# Patient Record
Sex: FEMALE | Race: WHITE | Employment: PART TIME | URBAN - METROPOLITAN AREA
[De-identification: names, ages, dates, MRNs, and addresses within clinical notes are randomized per-mention and may not be internally consistent; named-entity substitution may affect disease eponyms.]

---

## 2017-02-27 LAB
HBSAG, EXTERNAL: NEGATIVE
HIV, EXTERNAL: NON REACTIVE
RUBELLA, EXTERNAL: NORMAL
TYPE, ABO & RH, EXTERNAL: NORMAL

## 2017-06-01 ENCOUNTER — CLINICAL SUPPORT (OUTPATIENT)
Dept: CARDIOLOGY CLINIC | Age: 37
End: 2017-06-01

## 2017-06-01 ENCOUNTER — OFFICE VISIT (OUTPATIENT)
Dept: CARDIOLOGY CLINIC | Age: 37
End: 2017-06-01

## 2017-06-01 VITALS — SYSTOLIC BLOOD PRESSURE: 122 MMHG | WEIGHT: 218.2 LBS | HEART RATE: 112 BPM | DIASTOLIC BLOOD PRESSURE: 80 MMHG

## 2017-06-01 DIAGNOSIS — R00.2 PALPITATIONS: Primary | ICD-10-CM

## 2017-06-01 DIAGNOSIS — R42 DIZZINESS: ICD-10-CM

## 2017-06-01 DIAGNOSIS — Z3A.24 24 WEEKS GESTATION OF PREGNANCY: ICD-10-CM

## 2017-06-01 DIAGNOSIS — R42 DIZZY: ICD-10-CM

## 2017-06-01 DIAGNOSIS — R06.02 SHORTNESS OF BREATH: ICD-10-CM

## 2017-06-01 DIAGNOSIS — R06.02 SOB (SHORTNESS OF BREATH): ICD-10-CM

## 2017-06-01 NOTE — PROGRESS NOTES
[facility] [place]: Davon Yadav  [phone]  Requesting/referring provider:  Dr. Milly Duffy  Reason for Consult: Palpitations. HPI: Emeterio Fry, a 39y.o. year-old who presents for evaluation of palpitations, had some pre pregnancy, but these are a lot worse. Maybe 15 pound weight gain so far. Not an exerciser. She is eating more fruits and vegetables. 22 weeks so far. She had 2 episodes of 2-3 days with frequent palps, one per hour or so. Previously like a hard bang, like a boom boom and then stop. The second time it was softer and happening once an hour. No heart racing but has a high resting heart rate. She gets lightheaded with it. Takes her breath away and stops her in her tracks. She is anxious as well. Has taken meds for anxiety but none now. Previously on zoloft and wellbutrin combo. She is a nurse. When younger exercise limited by a sharp pain in the chest. Echo 14 years ago was normal.   Also feeling short of breath with exertion and with laying down. Assessment/Plan:  1. Obesity- weight 218  2. Pregnancy- 22 weeks  3. Palpitaitons- likely benign but given sx will get an echo  4. Tachycardia- will get a holter monitor to check overnight, 115 today  5. Heart murmur- echo to evaluate. Fhx mother with AS mgf with scd at 67 from [place], also with an uncle who  at 54. Soc no tob no eoth    She  has no past medical history on file. Cardiovascular ROS: positive for - dyspnea on exertion, palpitations and rapid heart rate  Respiratory ROS: positive for - orthopnea and shortness of breath  Neurological ROS: no TIA or stroke symptoms  All other systems negative except as above. PE  Vitals:    17 0846   BP: 122/80   Pulse: (!) 112   Weight: 218 lb 3.2 oz (99 kg)    There is no height or weight on file to calculate BMI.    General appearance - alert, well appearing, and in no distress  Mental status - affect appropriate to mood  Eyes - sclera anicteric, moist mucous membranes  Neck - supple, no significant adenopathy  Lymphatics - no  lymphadenopathy  Chest - clear to auscultation, no wheezes, rales or rhonchi  Heart - tachy, regular, 2/6 oj thorughout  Abdomen - soft, obese, nontender  Back exam - full range of motion, no tenderness  Neurological - cranial nerves II through XII grossly intact, no focal deficit  Musculoskeletal - no muscular tenderness noted, normal strength  Extremities - peripheral pulses normal, no pedal edema  Skin - normal coloration  no rashes    Recent Labs:  No results found for: CHOL, CHOLX, CHLST, CHOLV, 216075, HDL, LDL, DLDL, LDLC, DLDLP, TGL, TGLX, TRIGL, [id number], TRIGP, CHHD, CHHDX  No results found for: DWAYNE, CREAPOC, MCREA, ACREA, CREA, REFC3, REFC4  No results found for: BUN, BUNPOC, IBUN, MBUNV, BUNV  No results found for: K, KI, PLK, WBK  No results found for: HBA1C, HGBE8, YNH6VQOM  No results found for: HGBPOC, HGB, HGBP, HGBEXT  No results found for: PLT, PLTEXT    Reviewed:  No past medical history on file. History   Smoking Status    Former Smoker   Smokeless Tobacco    Not on file     Comment: Quit 16 years ago smoked for 5 years     History   Alcohol Use No     Allergies   Allergen Reactions    Macrobid [Nitrofurantoin Monohyd/M-Cryst] Other (comments)     Throat closed up       Current Outpatient Prescriptions   Medication Sig    PNV UK.35/WVLFMCL FUM/FOLIC AC (PRENATAL PO) Take 1 Tab by mouth daily. No current facility-administered medications for this visit.         MD Serge [facility]ielThe [facility] [place]  [address] [facility] [address]  [address]

## 2017-06-01 NOTE — MR AVS SNAPSHOT
Visit Information Date & Time Provider Department Dept. Phone Encounter #  
 6/1/2017  8:40 AM Sandro Rodriguez MD [facility] Antony Mcneil [phone] [id number] Follow-up Instructions Routing History Follow-up and Disposition History Your Appointments 6/1/2017 11:00 AM  
ECHO CARDIOGRAMS 2D with Dong Castaneda [facility] ([facility] SCHEDULING) Appt Note: Per Dr. Saleem Sims Echo  
 [address] [facility] [phone] Upcoming Health Maintenance Date Due DTaP/Tdap/Td series (1 - Tdap) 8/21/2001 PAP AKA CERVICAL CYTOLOGY 8/21/2001 INFLUENZA AGE 9 TO ADULT 8/1/2017 Allergies as of 6/1/2017  Review Complete On: 6/1/2017 By: Angelique Ayers Severity Noted Reaction Type Reactions Kalani Davianely [Nitrofurantoin Monohyd/m-cryst]  06/01/2017    Other (comments) Throat closed up Current Immunizations  Never Reviewed No immunizations on file. Not reviewed this visit You Were Diagnosed With   
  
 Codes Comments Palpitations    -  Primary ICD-10-CM: R00.2 ICD-9-CM: 785.1 Shortness of breath     ICD-10-CM: R06.02 
ICD-9-CM: 786.05 Dizziness     ICD-10-CM: N52 ICD-9-CM: 780.4 Vitals BP Pulse Weight(growth percentile) Smoking Status 122/80 (BP 1 Location: Left arm, BP Patient Position: Sitting) (!) 112 218 lb 3.2 oz (99 kg) Former Smoker Vitals History Your Updated Medication List  
  
   
This list is accurate as of: 6/1/17  9:30 AM.  Always use your most recent med list.  
  
  
  
  
 PRENATAL PO Take 1 Tab by mouth daily. We Performed the Following AMB POC EKG ROUTINE W/ 12 LEADS, INTER & REP [25606 CPT(R)] Introducing [facility]!    
 Radha Geiger introduces [facility] patient portal. Now you can access parts of your medical record, email your doctor's office, and request medication refills online. 1. In your internet browser, go to [email or web address] 2. Click on the First Time User? Click Here link in the Sign In box. You will see the New Member Sign Up page. 3. Enter your [employer] Access Code exactly as it appears below. You will not need to use this code after youve completed the sign-up process. If you do not sign up before the expiration date, you must request a new code. · [employer] Access Code: [email or web address] Expires: 8/30/2017  9:30 AM 
 
4. Enter the last four digits of your Social Security Number (xxxx) and Date of Birth (mm/dd/yyyy) as indicated and click Submit. You will be taken to the next sign-up page. 5. Create a [employer] ID. This will be your [employer] login ID and cannot be changed, so think of one that is secure and easy to remember. 6. Create a [employer] password. You can change your password at any time. 7. Enter your Password Reset Question and Answer. This can be used at a later time if you forget your password. 8. Enter your e-mail address. You will receive e-mail notification when new information is available in [address] 9. Click Sign Up. You can now view and download portions of your medical record. 10. Click the Download Summary menu link to download a portable copy of your medical information. If you have questions, please visit the Frequently Asked Questions section of the [employer] website. Remember, [employer] is NOT to be used for urgent needs. For medical emergencies, dial 911. Now available from your iPhone and Android! Please provide this summary of care documentation to your next provider. If you have any questions after today's visit, please call [phone].

## 2017-06-06 ENCOUNTER — CLINICAL SUPPORT (OUTPATIENT)
Dept: CARDIOLOGY CLINIC | Age: 37
End: 2017-06-06

## 2017-06-06 DIAGNOSIS — R00.2 PALPITATIONS: Primary | ICD-10-CM

## 2017-06-08 ENCOUNTER — TELEPHONE (OUTPATIENT)
Dept: CARDIOLOGY CLINIC | Age: 37
End: 2017-06-08

## 2017-06-08 NOTE — TELEPHONE ENCOUNTER
Pt calling to get the results of her test and her monitor. She can be reached at [phone].  [employer]

## 2017-06-13 NOTE — TELEPHONE ENCOUNTER
LVM for patient to return call at earliest convenience. Per Dr. Davon Yadav:  Echo: 6/17, WNL--EF 60-65%  Holter: WNL, no dangerous arrhthymias, HR , No PAC's, 3 PVC's      Patient returned call and above test results given.   2 pt identifiers used

## 2017-07-18 LAB — T. PALLIDUM, EXTERNAL: NEGATIVE

## 2017-09-15 LAB — GRBS, EXTERNAL: NEGATIVE

## 2017-10-06 ENCOUNTER — HOSPITAL ENCOUNTER (INPATIENT)
Age: 37
LOS: 3 days | Discharge: HOME | End: 2017-10-09
Attending: OBSTETRICS & GYNECOLOGY | Admitting: OBSTETRICS & GYNECOLOGY
Payer: COMMERCIAL

## 2017-10-06 PROBLEM — O42.90 PROM (PREMATURE RUPTURE OF MEMBRANES): Status: ACTIVE | Noted: 2017-10-06

## 2017-10-06 PROCEDURE — 4A1HXCZ MONITORING OF PRODUCTS OF CONCEPTION, CARDIAC RATE, EXTERNAL APPROACH: ICD-10-PCS | Performed by: OBSTETRICS & GYNECOLOGY

## 2017-10-06 PROCEDURE — 99283 EMERGENCY DEPT VISIT LOW MDM: CPT

## 2017-10-06 PROCEDURE — 65270000029 HC RM PRIVATE

## 2017-10-06 PROCEDURE — 59025 FETAL NON-STRESS TEST: CPT

## 2017-10-06 PROCEDURE — 75410000002 HC LABOR FEE PER 1 HR

## 2017-10-06 RX ORDER — NALBUPHINE HYDROCHLORIDE 10 MG/ML
10 INJECTION, SOLUTION INTRAMUSCULAR; INTRAVENOUS; SUBCUTANEOUS
Status: DISCONTINUED | OUTPATIENT
Start: 2017-10-06 | End: 2017-10-07 | Stop reason: HOSPADM

## 2017-10-06 RX ORDER — FENTANYL CITRATE 50 UG/ML
100 INJECTION, SOLUTION INTRAMUSCULAR; INTRAVENOUS
Status: DISCONTINUED | OUTPATIENT
Start: 2017-10-06 | End: 2017-10-07 | Stop reason: HOSPADM

## 2017-10-06 RX ORDER — TERBUTALINE SULFATE 1 MG/ML
0.25 INJECTION SUBCUTANEOUS AS NEEDED
Status: DISCONTINUED | OUTPATIENT
Start: 2017-10-06 | End: 2017-10-07 | Stop reason: HOSPADM

## 2017-10-06 NOTE — IP AVS SNAPSHOT
[address] [facility] [address] [place] [address] 
[phone] Patient: Jamie Fletcher MRN: [id number] WLP:[birth year] You are allergic to the following Allergen Reactions Macrobid (Nitrofurantoin Monohyd/M-Cryst) Other (comments) Throat closed up Recent Documentation Height Weight Breastfeeding? BMI OB Status Smoking Status 1.626 m 102.5 kg Unknown 38.79 kg/m2 Recent pregnancy Former Smoker Unresulted Labs Order Current Status SAMPLE TO BLOOD BANK In process Emergency Contacts Name Discharge Info Relation Home Work Mobile Jeanette Simpson [address] [employer] [3] Spouse [3] [phone] About your hospitalization You were admitted on:  October 6, 2017 You last received care in the:  [address] [facility] You were discharged on:  October 9, 2017 Unit phone number:  [phone] Why you were hospitalized Your primary diagnosis was:  Not on File Your diagnoses also included:  Prom (Premature Rupture Of Membranes) Providers Seen During Your Hospitalizations Provider Role Specialty Primary office phone Hawa Fuentes MD Attending Provider Obstetrics & Gynecology [phone] Your Primary Care Physician (PCP) Primary Care Physician Office Phone Office Fax NONE ** None ** ** None ** Follow-up Information Follow up With Details Comments Contact Info None   None (395) Patient stated that they have no PCP Hawa Fuentes MD Schedule an appointment as soon as possible for a visit in 6 weeks Routine postpartum follow-up appointment [address] [facility] [address] [place] [address] 
[phone] Current Discharge Medication List  
  
START taking these medications Dose & Instructions Dispensing Information Comments Morning Noon Evening Bedtime  
 docusate sodium 100 mg capsule Commonly known as:  Theo Borja  
   
 Your last dose was: Your next dose is:    
   
   
 Dose:  100 mg Take 1 Cap by mouth two (2) times daily as needed for Constipation. Quantity:  60 Cap Refills:  0  
     
   
   
   
  
 ibuprofen 600 mg tablet Commonly known as:  MOTRIN Your last dose was: Your next dose is:    
   
   
 Dose:  600 mg Take 1 Tab by mouth every six (6) hours as needed for Pain. Quantity:  40 Tab Refills:  0  
     
   
   
   
  
 oxyCODONE-acetaminophen 5-325 mg per tablet Commonly known as:  PERCOCET Your last dose was: Your next dose is:    
   
   
 Dose:  1 Tab Take 1 Tab by mouth every four (4) hours as needed for Pain. Max Daily Amount: 6 Tabs. Quantity:  12 Tab Refills:  0 CONTINUE these medications which have NOT CHANGED Dose & Instructions Dispensing Information Comments Morning Noon Evening Bedtime PRENATAL PO Your last dose was: Your next dose is:    
   
   
 Dose:  1 Tab Take 1 Tab by mouth daily. Refills:  0 Where to Get Your Medications Information on where to get these meds will be given to you by the nurse or doctor. ! Ask your nurse or doctor about these medications  
  docusate sodium 100 mg capsule  
 ibuprofen 600 mg tablet  
 oxyCODONE-acetaminophen 5-325 mg per tablet Discharge Instructions POSTPARTUM DISCHARGE INSTRUCTIONS Name:  Alison Harris YOB: 1980 Admission Diagnosis:  PROM (premature rupture of membranes) Discharge Diagnosis:   
Problem List as of 10/9/2017  Never Reviewed Codes Class Noted - Resolved PROM (premature rupture of membranes) ICD-10-CM: O42.90 ICD-9-CM: 658.10  10/6/2017 - Present Palpitations ICD-10-CM: R00.2 ICD-9-CM: 785.1  6/1/2017 - Present Shortness of breath ICD-10-CM: R06.02 
ICD-9-CM: 786.05  6/1/2017 - Present  Dizziness ICD-10-CM: L75 
 ICD-9-CM: 780.4  6/1/2017 - Present Attending Physician:  Christa Ritter MD 
 
Delivery Type:  Vaginal Childbirth with Episiotomy, Laceration or Tear: What To Expect At [address] [facility] Your body will slowly heal in the next few weeks. It is easy to get too tired and overwhelmed during the first weeks after your baby is born. Changes in your hormones can shift your mood without warning. You may find it hard to meet the extra demands on your energy and time. Take it easy on yourself. Follow-up care is a key part of your treatment and safety. Be sure to make and go to all appointments, and call your doctor if you are having problems. It's also a good idea to know your test results and keep a list of the medicines you take. How can you care for yourself at home? Vaginal Bleeding and Cramps · After delivery, you will have a bloody discharge from the vagina. This will turn pink within a week and then white or yellow after about 10 days. It may last for 2 to 4 weeks or longer, until the uterus has healed. Use pads instead of tampons until you stop bleeding. · Do not worry if you pass some blood clots, as long as they are smaller than a golf ball. If you have a tear or stitches in your vaginal area, change the pad at least every 4 hours to prevent soreness and infection. · You may have cramps for the first few days after childbirth. These are normal and occur as the uterus shrinks to normal size. Take an over-the-counter pain medicine, such as acetaminophen (Tylenol), ibuprofen (Advil, Motrin), or naproxen (Aleve), for cramps. Read and follow all instructions on the label. Do not take aspirin, because it can cause more bleeding. Do not take acetaminophen (Tylenol) and other acetaminophen containing medications (i.e. Percocet) at the same time. Episiotomy, Lacerations or Tears · If you have stitches, they will dissolve on their own and do not need to be removed. · Put ice or a cold pack on your painful area for 10 to 20 minutes at a time, several times a day, for the first few days. Put a thin cloth between the ice and your skin. · Sit in a few inches of warm water (sitz bath) 3 times a day and after bowel movements. The warm water helps with pain and itching. If you do not have a tub, a warm shower might help. Breast fullness · Your breasts may overfill (engorge) in the first few days after delivery. To help milk flow and to relieve pain, warm your breasts in the shower or by using warm, moist towels before nursing. · If you are not nursing, do not put warmth on your breasts or touch your breasts. Wear a tight bra or sports bra and use ice until the fullness goes away. This usually takes 2 to 3 days. · Put ice or a cold pack on your breast after nursing to reduce swelling and pain. Put a thin cloth between the ice and your skin. Activity · Eat a balanced diet. Do not try to lose weight by cutting calories. Keep taking your prenatal vitamins, or take a multivitamin. · Get as much rest as you can. Try to take naps when your baby sleeps during the day. · Get some exercise every day. But do not do any heavy exercise until your doctor says it is okay. · Wait until you are healed (about 4 to 6 weeks) before you have sexual intercourse. Your doctor will tell you when it is okay to have sex. · Talk to your doctor about birth control. You can get pregnant even before your period returns. Also, you can get pregnant while you are breast-feeding. Mental Health · Many women get the \"baby blues\" during the first few days after childbirth. You may lose sleep, feel irritable, and cry easily. You may feel happy one minute and sad the next. Hormone changes are one cause of these emotional changes. Also, the demands of a new baby, along with visits from relatives or other family needs, add to a mother's stress.  The \"baby blues\" often peak around the fourth day. Then they ease up in less than 2 weeks. · If your moodiness or anxiety lasts for more than 2 weeks, or if you feel like life is not worth living, you may have postpartum depression. This is different for each mother. Some mothers with serious depression may worry intensely about their infant's well-being. Others may feel distant from their child. Some mothers might even feel that they might harm their baby. A mother may have signs of paranoia, wondering if someone is watching her. · With all the changes in your life, you may not know if you are depressed. Pregnancy sometimes causes changes in how you feel that are similar to the symptoms of depression. · Symptoms of depression include: · Feeling sad or hopeless and losing interest in daily activities. These are the most common symptoms of depression. · Sleeping too much or not enough. · Feeling tired. You may feel as if you have no energy. · Eating too much or too little. · POSTPARTUM SUPPORT INTERNATIONAL (PSI) offers a Warm line; Chat with the Expert phone sessions; Information and Articles about Pregnancy and Postpartum Mood Disorders; Comprehensive List of Free Support Groups; Knowledgeable local coordinators who will offer support, information, and resources; Guide to Resources on HomeStay; Calendar of events in the  mood disorders community; Latest News and Research; and [facility] [address] for [employer]. Remember - You are not alone; You are not to blame; With help, you will be well. 3-882-750-PPD(7059). WWW. POSTPARTUM. NET · Writing or talking about death, such as writing suicide notes or talking about guns, knives, or pills. Keep the numbers for these national suicide hotlines: 8-020-845-TALK (4-433.613.1973) and 9-718-GONCBTW (4-942.814.8151). If you or someone you know talks about suicide or feeling hopeless, get help right away. Constipation and Hemorrhoids Drink plenty of fluids, enough so that your urine is light yellow or clear like water. If you have kidney, heart, or liver disease and have to limit fluids, talk with your doctor before you increase the amount of fluids you drink. · Eat plenty of fiber each day. Have a bran muffin or bran cereal for breakfast, and try eating a piece of fruit for a mid-afternoon snack. · For painful, itchy hemorrhoids, put ice or a cold pack on the area several times a day for 10 minutes at a time. Follow this by putting a warm compress on the area for another 10 to 20 minutes or by sitting in a shallow, warm bath. When should you call for help? Call 911 anytime you think you may need emergency care. For example, call if: 
· You are thinking of hurting yourself, your baby, or anyone else. · You passed out (lost consciousness). · You have symptoms of a blood clot in your lung (called a pulmonary embolism). These may include: 
· Sudden chest pain. · Trouble breathing. · Coughing up blood. Call your doctor now or seek immediate medical care if: 
· You have severe vaginal bleeding. · You are soaking through a pad each hour for 2 or more hours. · Your vaginal bleeding seems to be getting heavier or is still bright red 4 days after delivery. · You are dizzy or lightheaded, or you feel like you may faint. · You are vomiting or cannot keep fluids down. · You have a fever. · You have new or more belly pain. · You pass tissue (not just blood). · Your vaginal discharge smells bad. · Your belly feels tender or full and hard. · Your breasts are continuously painful or red. · You feel sad, anxious, or hopeless for more than a few days. · You have sudden, severe pain in your belly. · You have symptoms of a blood clot in your leg (called a deep vein thrombosis), such as: 
· Pain in your calf, back of the knee, thigh, or groin. · Redness and swelling in your leg or groin. · You have symptoms of preeclampsia, such as: · Sudden swelling of your face, hands, or feet. · New vision problems (such as dimness or blurring). · A severe headache. · Your blood pressure is higher than it should be or rises suddenly. · You have new nausea or vomiting. Watch closely for changes in your health, and be sure to contact your doctor if you have any problems. Additional Information:  Postpartum Support PARENTS:  Are you feeling sad or depressed? Is it difficult for you to enjoy yourself? Do you feel more irritable or tense? Do you feel anxious or panicky? Are you having difficulty bonding with your baby? Do you feel as if you are \"out of control\" or \"going crazy\"? Are you worried that you might hurt your baby or yourself? FAMILIES: Do you worry that something is wrong but don't know how to help? Do you think that your partner or spouse is having problems coping? Are you worried that it may never get better? While many women experience some mild mood change or \"the blues\" during or after the birth of a child, 1 in 9 women experience more significant symptoms of depression or anxiety. 1 in 10 Dads become depressed during the first year. Things you can do Being a good parent includes taking care of yourself. If you take care of yourself, you will be able to take better care of your baby and your family. · Talk to a counselor or healthcare provider who has training in  mood and anxiety problems. · Learn as much as you can about pregnancy and postpartum depression and anxiety. · Get support from family and friends. Ask for help when you need it. · Join a support group in your area or online. · Keep active by walking, stretching or whatever form of exercise helps you to feel better. · Get enough rest and time for yourself. · Eat a healthy diet. · Don't give up! It may take more than one try to get the right help you need. These are general instructions for a healthy lifestyle: No smoking/ No tobacco products/ Avoid exposure to second hand smoke Surgeon General's Warning:  Quitting smoking now greatly reduces serious risk to your health. Obesity, smoking, and sedentary lifestyle greatly increases your risk for illness A healthy diet, regular physical exercise & weight monitoring are important for maintaining a healthy lifestyle Recognize signs and symptoms of STROKE: 
 
F-face looks uneven A-arms unable to move or move unevenly S-speech slurred or non-existent T-time-call 911 as soon as signs and symptoms begin - DO NOT go  
    back to bed or wait to see if you get better - TIME IS BRAIN. I have had the opportunity to make my options or choices for discharge. I have received and understand these instructions. Discharge Orders None Introducing [facility]! Katie Vasquez introduces [employer] patient portal. Now you can access parts of your medical record, email your doctor's office, and request medication refills online. 1. In your internet browser, go to [email or web address] 2. Click on the First Time User? Click Here link in the Sign In box. You will see the New Member Sign Up page. 3. Enter your [employer] Access Code exactly as it appears below. You will not need to use this code after youve completed the sign-up process. If you do not sign up before the expiration date, you must request a new code. · [employer] Access Code: [email or web address] Expires: 1/7/2018 12:34 PM 
 
4. Enter the last four digits of your Social Security Number (xxxx) and Date of Birth (mm/dd/yyyy) as indicated and click Submit. You will be taken to the next sign-up page. 5. Create a [employer] ID. This will be your [employer] login ID and cannot be changed, so think of one that is secure and easy to remember. 6. Create a [employer] password. You can change your password at any time. 7. Enter your Password Reset Question and Answer.  This can be used at a later time if you forget your password. 8. Enter your e-mail address. You will receive e-mail notification when new information is available in [address] 9. Click Sign Up. You can now view and download portions of your medical record. 10. Click the Download Summary menu link to download a portable copy of your medical information. If you have questions, please visit the Frequently Asked Questions section of the [employer] website. Remember, [employer] is NOT to be used for urgent needs. For medical emergencies, dial 911. Now available from your iPhone and Android! General Information Please provide this summary of care documentation to your next provider. Patient Signature:  ____________________________________________________________ Date:  ____________________________________________________________  
  
Clarice Charter Provider Signature:  ____________________________________________________________ Date:  ____________________________________________________________

## 2017-10-06 NOTE — IP AVS SNAPSHOT
[address] 
[phone] Patient: Randall Quiroz MRN: [id number] KAX:6/17/1718 Current Discharge Medication List  
  
START taking these medications Dose & Instructions Dispensing Information Comments Morning Noon Evening Bedtime  
 docusate sodium 100 mg capsule Commonly known as:  Renville Peels Your last dose was: Your next dose is:    
   
   
 Dose:  100 mg Take 1 Cap by mouth two (2) times daily as needed for Constipation. Quantity:  60 Cap Refills:  0  
     
   
   
   
  
 ibuprofen 600 mg tablet Commonly known as:  MOTRIN Your last dose was: Your next dose is:    
   
   
 Dose:  600 mg Take 1 Tab by mouth every six (6) hours as needed for Pain. Quantity:  40 Tab Refills:  0  
     
   
   
   
  
 oxyCODONE-acetaminophen 5-325 mg per tablet Commonly known as:  PERCOCET Your last dose was: Your next dose is:    
   
   
 Dose:  1 Tab Take 1 Tab by mouth every four (4) hours as needed for Pain. Max Daily Amount: 6 Tabs. Quantity:  12 Tab Refills:  0 CONTINUE these medications which have NOT CHANGED Dose & Instructions Dispensing Information Comments Morning Noon Evening Bedtime PRENATAL PO Your last dose was: Your next dose is:    
   
   
 Dose:  1 Tab Take 1 Tab by mouth daily. Refills:  0 Where to Get Your Medications Information on where to get these meds will be given to you by the nurse or doctor. ! Ask your nurse or doctor about these medications  
  docusate sodium 100 mg capsule  
 ibuprofen 600 mg tablet  
 oxyCODONE-acetaminophen 5-325 mg per tablet

## 2017-10-07 ENCOUNTER — ANESTHESIA EVENT (OUTPATIENT)
Dept: LABOR AND DELIVERY | Age: 37
End: 2017-10-07
Payer: COMMERCIAL

## 2017-10-07 ENCOUNTER — ANESTHESIA (OUTPATIENT)
Dept: LABOR AND DELIVERY | Age: 37
End: 2017-10-07
Payer: COMMERCIAL

## 2017-10-07 LAB
ERYTHROCYTE [DISTWIDTH] IN BLOOD BY AUTOMATED COUNT: 14.3 % (ref 11.5–14.5)
HCT VFR BLD AUTO: 35.2 % (ref 35–47)
HGB BLD-MCNC: 11.4 G/DL (ref 11.5–16)
MCH RBC QN AUTO: 29.4 PG (ref 26–34)
MCHC RBC AUTO-ENTMCNC: 32.4 G/DL (ref 30–36.5)
MCV RBC AUTO: 90.7 FL (ref 80–99)
PLATELET # BLD AUTO: 247 K/UL (ref 150–400)
RBC # BLD AUTO: 3.88 M/UL (ref 3.8–5.2)
WBC # BLD AUTO: 12.2 K/UL (ref 3.6–11)

## 2017-10-07 PROCEDURE — 76060000078 HC EPIDURAL ANESTHESIA

## 2017-10-07 PROCEDURE — 85027 COMPLETE CBC AUTOMATED: CPT | Performed by: OBSTETRICS & GYNECOLOGY

## 2017-10-07 PROCEDURE — 77030007880 HC KT SPN EPDRL BBMI -B

## 2017-10-07 PROCEDURE — 65410000002 HC RM PRIVATE OB

## 2017-10-07 PROCEDURE — 75410000002 HC LABOR FEE PER 1 HR

## 2017-10-07 PROCEDURE — 75410000003 HC RECOV DEL/VAG/CSECN EA 0.5 HR

## 2017-10-07 PROCEDURE — 0HQ9XZZ REPAIR PERINEUM SKIN, EXTERNAL APPROACH: ICD-10-PCS | Performed by: SPECIALIST

## 2017-10-07 PROCEDURE — 75410000000 HC DELIVERY VAGINAL/SINGLE

## 2017-10-07 PROCEDURE — 36415 COLL VENOUS BLD VENIPUNCTURE: CPT | Performed by: OBSTETRICS & GYNECOLOGY

## 2017-10-07 PROCEDURE — 74011250636 HC RX REV CODE- 250/636: Performed by: ANESTHESIOLOGY

## 2017-10-07 PROCEDURE — 74011250636 HC RX REV CODE- 250/636: Performed by: OBSTETRICS & GYNECOLOGY

## 2017-10-07 PROCEDURE — 74011000250 HC RX REV CODE- 250

## 2017-10-07 RX ORDER — ONDANSETRON 4 MG/1
4 TABLET, ORALLY DISINTEGRATING ORAL
Status: DISCONTINUED | OUTPATIENT
Start: 2017-10-07 | End: 2017-10-09 | Stop reason: HOSPADM

## 2017-10-07 RX ORDER — OXYTOCIN IN 5 % DEXTROSE 30/500 ML
1-25 PLASTIC BAG, INJECTION (ML) INTRAVENOUS
Status: DISCONTINUED | OUTPATIENT
Start: 2017-10-07 | End: 2017-10-07 | Stop reason: HOSPADM

## 2017-10-07 RX ORDER — SIMETHICONE 80 MG
80 TABLET,CHEWABLE ORAL
Status: DISCONTINUED | OUTPATIENT
Start: 2017-10-07 | End: 2017-10-09 | Stop reason: HOSPADM

## 2017-10-07 RX ORDER — BUPIVACAINE HYDROCHLORIDE 2.5 MG/ML
INJECTION, SOLUTION EPIDURAL; INFILTRATION; INTRACAUDAL AS NEEDED
Status: DISCONTINUED | OUTPATIENT
Start: 2017-10-07 | End: 2017-10-07 | Stop reason: HOSPADM

## 2017-10-07 RX ORDER — LIDOCAINE HYDROCHLORIDE AND EPINEPHRINE 15; 5 MG/ML; UG/ML
INJECTION, SOLUTION EPIDURAL AS NEEDED
Status: DISCONTINUED | OUTPATIENT
Start: 2017-10-07 | End: 2017-10-07 | Stop reason: HOSPADM

## 2017-10-07 RX ORDER — FENTANYL/BUPIVACAINE/NS/PF 2-1250MCG
1-16 PREFILLED PUMP RESERVOIR EPIDURAL CONTINUOUS
Status: DISCONTINUED | OUTPATIENT
Start: 2017-10-07 | End: 2017-10-09 | Stop reason: HOSPADM

## 2017-10-07 RX ORDER — DOCUSATE SODIUM 100 MG/1
100 CAPSULE, LIQUID FILLED ORAL
Status: DISCONTINUED | OUTPATIENT
Start: 2017-10-07 | End: 2017-10-09 | Stop reason: HOSPADM

## 2017-10-07 RX ORDER — SWAB
1 SWAB, NON-MEDICATED MISCELLANEOUS DAILY
Status: DISCONTINUED | OUTPATIENT
Start: 2017-10-08 | End: 2017-10-09 | Stop reason: HOSPADM

## 2017-10-07 RX ORDER — SODIUM CHLORIDE 0.9 % (FLUSH) 0.9 %
5-10 SYRINGE (ML) INJECTION EVERY 8 HOURS
Status: DISCONTINUED | OUTPATIENT
Start: 2017-10-07 | End: 2017-10-09 | Stop reason: HOSPADM

## 2017-10-07 RX ORDER — AMMONIA 15 % (W/V)
1 AMPUL (EA) INHALATION AS NEEDED
Status: DISCONTINUED | OUTPATIENT
Start: 2017-10-07 | End: 2017-10-09 | Stop reason: HOSPADM

## 2017-10-07 RX ORDER — SODIUM CHLORIDE 0.9 % (FLUSH) 0.9 %
SYRINGE (ML) INJECTION
Status: COMPLETED
Start: 2017-10-07 | End: 2017-10-07

## 2017-10-07 RX ORDER — HYDROCORTISONE ACETATE PRAMOXINE HCL 2.5; 1 G/100G; G/100G
CREAM TOPICAL AS NEEDED
Status: DISCONTINUED | OUTPATIENT
Start: 2017-10-07 | End: 2017-10-09 | Stop reason: HOSPADM

## 2017-10-07 RX ORDER — OXYCODONE AND ACETAMINOPHEN 5; 325 MG/1; MG/1
1 TABLET ORAL
Status: DISCONTINUED | OUTPATIENT
Start: 2017-10-07 | End: 2017-10-09 | Stop reason: HOSPADM

## 2017-10-07 RX ORDER — FENTANYL CITRATE 50 UG/ML
INJECTION, SOLUTION INTRAMUSCULAR; INTRAVENOUS AS NEEDED
Status: DISCONTINUED | OUTPATIENT
Start: 2017-10-07 | End: 2017-10-07 | Stop reason: HOSPADM

## 2017-10-07 RX ORDER — BUPIVACAINE HYDROCHLORIDE 2.5 MG/ML
75 INJECTION, SOLUTION EPIDURAL; INFILTRATION; INTRACAUDAL ONCE
Status: DISPENSED | OUTPATIENT
Start: 2017-10-07 | End: 2017-10-07

## 2017-10-07 RX ORDER — FENTANYL CITRATE 50 UG/ML
100 INJECTION, SOLUTION INTRAMUSCULAR; INTRAVENOUS ONCE
Status: DISCONTINUED | OUTPATIENT
Start: 2017-10-07 | End: 2017-10-07 | Stop reason: HOSPADM

## 2017-10-07 RX ORDER — SODIUM CHLORIDE 0.9 % (FLUSH) 0.9 %
5-10 SYRINGE (ML) INJECTION AS NEEDED
Status: DISCONTINUED | OUTPATIENT
Start: 2017-10-07 | End: 2017-10-09 | Stop reason: HOSPADM

## 2017-10-07 RX ORDER — SODIUM CHLORIDE, SODIUM LACTATE, POTASSIUM CHLORIDE, CALCIUM CHLORIDE 600; 310; 30; 20 MG/100ML; MG/100ML; MG/100ML; MG/100ML
125 INJECTION, SOLUTION INTRAVENOUS CONTINUOUS
Status: DISCONTINUED | OUTPATIENT
Start: 2017-10-07 | End: 2017-10-09 | Stop reason: HOSPADM

## 2017-10-07 RX ORDER — IBUPROFEN 400 MG/1
800 TABLET ORAL EVERY 8 HOURS
Status: DISCONTINUED | OUTPATIENT
Start: 2017-10-07 | End: 2017-10-09 | Stop reason: HOSPADM

## 2017-10-07 RX ORDER — LIDOCAINE HYDROCHLORIDE AND EPINEPHRINE 15; 5 MG/ML; UG/ML
4.5 INJECTION, SOLUTION EPIDURAL AS NEEDED
Status: DISCONTINUED | OUTPATIENT
Start: 2017-10-07 | End: 2017-10-07 | Stop reason: HOSPADM

## 2017-10-07 RX ORDER — OXYTOCIN/RINGER'S LACTATE 20/1000 ML
125-1000 PLASTIC BAG, INJECTION (ML) INTRAVENOUS AS NEEDED
Status: DISCONTINUED | OUTPATIENT
Start: 2017-10-07 | End: 2017-10-09 | Stop reason: HOSPADM

## 2017-10-07 RX ORDER — ACETAMINOPHEN 325 MG/1
650 TABLET ORAL
Status: DISCONTINUED | OUTPATIENT
Start: 2017-10-07 | End: 2017-10-09 | Stop reason: HOSPADM

## 2017-10-07 RX ORDER — HYDROCORTISONE 1 %
CREAM (GRAM) TOPICAL AS NEEDED
Status: DISCONTINUED | OUTPATIENT
Start: 2017-10-07 | End: 2017-10-09 | Stop reason: HOSPADM

## 2017-10-07 RX ORDER — NALOXONE HYDROCHLORIDE 0.4 MG/ML
0.4 INJECTION, SOLUTION INTRAMUSCULAR; INTRAVENOUS; SUBCUTANEOUS AS NEEDED
Status: DISCONTINUED | OUTPATIENT
Start: 2017-10-07 | End: 2017-10-07 | Stop reason: HOSPADM

## 2017-10-07 RX ADMIN — SODIUM CHLORIDE, SODIUM LACTATE, POTASSIUM CHLORIDE, AND CALCIUM CHLORIDE 125 ML/HR: 600; 310; 30; 20 INJECTION, SOLUTION INTRAVENOUS at 19:37

## 2017-10-07 RX ADMIN — Medication 2 MILLI-UNITS/MIN: at 10:39

## 2017-10-07 RX ADMIN — BUPIVACAINE HYDROCHLORIDE 2 ML: 2.5 INJECTION, SOLUTION EPIDURAL; INFILTRATION; INTRACAUDAL at 17:57

## 2017-10-07 RX ADMIN — BUPIVACAINE HYDROCHLORIDE 2 ML: 2.5 INJECTION, SOLUTION EPIDURAL; INFILTRATION; INTRACAUDAL at 17:59

## 2017-10-07 RX ADMIN — LIDOCAINE HYDROCHLORIDE AND EPINEPHRINE 5 ML: 15; 5 INJECTION, SOLUTION EPIDURAL at 17:50

## 2017-10-07 RX ADMIN — BUPIVACAINE HYDROCHLORIDE 2 ML: 2.5 INJECTION, SOLUTION EPIDURAL; INFILTRATION; INTRACAUDAL at 18:01

## 2017-10-07 RX ADMIN — Medication 10 ML: at 00:40

## 2017-10-07 RX ADMIN — SODIUM CHLORIDE, SODIUM LACTATE, POTASSIUM CHLORIDE, AND CALCIUM CHLORIDE 1000 ML: 600; 310; 30; 20 INJECTION, SOLUTION INTRAVENOUS at 17:29

## 2017-10-07 RX ADMIN — FENTANYL 0.2 MG/100ML-BUPIV 0.125%-NACL 0.9% EPIDURAL INJ 10 ML/HR: 2/0.125 SOLUTION at 18:00

## 2017-10-07 RX ADMIN — FENTANYL CITRATE 100 MCG: 50 INJECTION, SOLUTION INTRAMUSCULAR; INTRAVENOUS at 17:55

## 2017-10-07 RX ADMIN — Medication 10 ML: at 07:49

## 2017-10-07 RX ADMIN — Medication 10 ML: at 22:21

## 2017-10-07 NOTE — H&P
Labor and Delivery Admission Note  10/6/2017    40 y.o., , female, G2 P 1002 Estimated Date of Delivery: 10/9/17 by dates and US presents with leakage of fluid  at 2220  Reports good fetal movement, no bleeding, and has mild contractions. States her water bag broke around 9 pm , clear fluid accompanied with mild contractions. Was seen in the office by Dr. Irasema Duggan and was 1/30/-3. Mentions having had palpitations in the earlier part of the pregnancy and had been evaluated by the cardiologist around 20 weeks with normal ECHO. PNC: Blood type: A            RH: pos            Rubella:            SVII serology: neg             GBS status: neg  Past Medical History:   Diagnosis Date    Asthma     with illness; last needed inhaler > 1 year ago    Generalized anxiety disorder     last took medication 3 years ago    History of palpitations in adulthood 2015    saw Dr. Trey Banegas 6/2017, echo normal, sinus tach on holter monitor , murmur noted on exam    OCD (obsessive compulsive disorder)     Panic disorder     Postpartum depression      History reviewed. No pertinent surgical history. OB/GYN: Dr. Laila Salmon:   Current Facility-Administered Medications   Medication Dose Route Frequency    lactated ringers bolus infusion 500-1,000 mL  500-1,000 mL IntraVENous PRN    terbutaline (BRETHINE) injection 0.25 mg  0.25 mg SubCUTAneous PRN    nalbuphine (NUBAIN) injection 10 mg  10 mg IntraVENous Q2H PRN    fentaNYL citrate (PF) injection 100 mcg  100 mcg IntraVENous Q1H PRN     Allergies:    Allergies   Allergen Reactions    Macrobid [Nitrofurantoin Monohyd/M-Cryst] Other (comments)     Throat closed up     Pertinent ROS: per HPI   Family History   Problem Relation Age of Onset    Diabetes Mother     High Cholesterol Mother     Diabetes Father     Cancer Father      of the throat    Heart Disease Maternal Grandfather     Parkinson's Disease Paternal Grandmother     Hypertension Paternal Grandmother    [address] [facility] Liver Disease Paternal Grandfather      Social History     Social History    Marital status:      Spouse name: N/A    Number of children: N/A    Years of education: N/A     Occupational History    Not on file. Social History Main Topics    Smoking status: Former Smoker    Smokeless tobacco: Never Used      Comment: Quit 16 years ago smoked for 5 years    Alcohol use No    Drug use: No    Sexual activity: Yes     Partners: Male     Birth control/ protection: None     Other Topics Concern    Not on file     Social History Narrative       OBJECTIVE:  Gravid , female NAD  Temp (24hrs), Av °F (36.7 °C), Min:98 °F (36.7 °C), Max:98 °F (36.7 °C)    Visit Vitals    /76    Pulse 99    Temp 98 °F (36.7 °C)    Resp 16    Ht 5' 4\" (1.626 m)    Wt 102.5 kg (226 lb)    Breastfeeding No    BMI 38.79 kg/m2       Labs:  No results found for: WBC    Exam:  HEENT:  normal   Lungs:  clear  Cor:  RRR  Abdomen:  Fundal height 40 cm                    Soft between UC  Fetal heart rate tracin, moderate variability, +accels noted  Contraction pattern: every 3-5 mins  Cervix:  deferred  Fluid: Clear  Pelvimetry:  AP-good                      Arch- adequate                      Sidewalls- adequate                      Pelvis feels adequate for fetus.     Impression: 39 y/o @ 39 weeks 4 days with PROM  Admit to L&D  GBS-Negative  Encouraged ambulation  Pain management discussed  Pitocin augmentation discussed if no cervical change noted in 4-6 hrs    Vivien Salomon MD

## 2017-10-07 NOTE — PROGRESS NOTES
Labor Progress Note  Patient seen, fetal heart rate and contraction pattern evaluated, patient examined. Patient Vitals for the past 1 hrs:   BP Temp Pulse Resp   10/07/17 1547 120/79 98.2 °F (36.8 °C) 73 18       Physical Exam:  Cervical Exam:  4 cm dilated    60% effaced    -3 station    Presenting Part: cephalic  Membranes:  Spontaneous Rupture of Membranes; Amniotic Fluid: clear fluid  Uterine Activity: every 5 minutes, mild to moderate  Fetal Heart Rate: Reactive    Assessment/Plan:  Reassuring fetal surveillance  Still in latent labor; patient educated regarding expectations for labor progression - despite PROM, has not been in labor, has only had 4 hours of pitocin, should consider coping and pain management strategies for ensuing 12+ hours.    Re-check in approximately 6 hours, sooner as indicated  Consider epidural

## 2017-10-07 NOTE — PROGRESS NOTES
Patient seen, chart reviewed. Briefly;  with history of prior 8lb 15 oz  admitted with SROM 12 hours ago. Since then very few contractions. Patient is currently without complaints. Patient Vitals for the past 4 hrs:   Temp Pulse Resp BP   10/07/17 0745 97.8 °F (36.6 °C) 93 16 94/50       Gen:  NAD  Abd:  Soft, nontender, gravid  Cervix:  Deferred; 3/30%/posterior / soft at 4:15a per RN  EFW 8.5#    Cat 1 FHR tracing  Irregular mild CTX    Counseling regarding unlikely spontaneous onset of labor now that 12 hours have passed since SROM. Encouraged and educated patient / family on role of pitocin in this setting. She agrees to proceed.     Plan:  Initiate pitocin now  Epidural prn  GBS negative  RH +

## 2017-10-07 NOTE — PROGRESS NOTES
0740. Bedside and Verbal shift change report given to wKeshia Corado1 [facility] moncho Edouard (oncoming nurse) by Paula Neil rn (offgoing nurse). Report included the following information SBAR, Intake/Output, MAR and Recent Results. 0911. Pt encouraged to get out of bed, used birthing ball prior to being placed on monitor and choosing to lie in bed now. Pt encouraged to ambulate halls while off monitor. [id number]. Dr. Baljit Lui at bedside to discuss plan of care. Recommending starting pitocin due to >12 hours SROM and not sean regularly. Pt questions answered. Plan to start IV pitocin. 1039. IV Pitocin started at 2ml/hour.     1900. Dr. Baljit Lui reviewed heart rate and contraction pattern. 10L 02 mask applied. Pt feeling better. No new orders at this time. Pt feeling pressure with contractions. 1930. Bedside and Verbal shift change report given to Paula Neil rn (oncoming nurse) by scottie Johnson rn (offgoing nurse). Report included the following information SBAR, Intake/Output, MAR and Recent Results.

## 2017-10-07 NOTE — PROGRESS NOTES
10/6/2017 10:20 PM Pt arrived to L&D room 7 with c/o leaking fluid since 2100. Reports fetal movement, denies vaginal bleeding, but states that fluid is pink-tinged. [address] [address] to Dr. Dhara Copeland on phone. Updated her on pt's arrival, c/o, GA, SROM at 2100, Nitrazine, FHR tracing, UC pattern, cervical exam from yesterday (1/30/high/vtx), and GBS negative. Inpatient orders received, states she will be out to see pt.  2324 Dr. Dhara Copeland at bedside. US performed, vtx confirmed. Discussing plan of care. Plan ambulation/position changes/rest as desired. May have IV pain meds or epidural prn. Plan for Pitocin around 4 if no cervical change. 10/7/15 @ 0415 Pt declines Pitocin at this time. States that she is \"happy\" that her cervix has changed to 3 cm, and that now that she's gotten some rest, she is \"ready to get up and be active\" to see if she will continue to progress without it.  0606 Dr. Dhara Copeland at bedside, aware of SVE, viewed FHR tracing (aware of decels in semi- and high-fowlers positions) and UC patterns. Discussing with pt her concerns about Pitocin. Discussed risks/benefits of starting Pitocin now vs. waiting. Dr. Dhara Copeland recommends to pt starting Pitocin now. Pt would like more time. 8840 Bedside and Verbal shift change report given to Sharda Shaikh RN (oncoming nurse) by Kirsty Granado RN (offgoing nurse). Report included the following information OBSBAR.

## 2017-10-08 PROCEDURE — 65410000002 HC RM PRIVATE OB

## 2017-10-08 PROCEDURE — 74011250637 HC RX REV CODE- 250/637: Performed by: SPECIALIST

## 2017-10-08 RX ADMIN — ACETAMINOPHEN 650 MG: 325 TABLET, FILM COATED ORAL at 16:10

## 2017-10-08 RX ADMIN — Medication 1 TABLET: at 09:40

## 2017-10-08 RX ADMIN — ACETAMINOPHEN 650 MG: 325 TABLET, FILM COATED ORAL at 09:43

## 2017-10-08 NOTE — LACTATION NOTE
This note was copied from a baby's chart. Initial Lactation Consultation - Baby born vaginally yesterday evening to a  mom at 44 5/7 weeks. Mom states she nursed her 13year old for 3 months. She felt she had a good milk supply but a forceful milk letdown. Mom states this baby latched and nursed well after delivery but has very sleepy most of today. I worked with her this afternoon and gave mom some tips on getting the baby latched deeply. She was able to get him to latched and nursing. When I was in the room and baby was nursing I did not hear him swallowing. Mom was not sure if she has heard him swallow the other times he nursed. Feeding Plan: Mother will keep baby skin to skin as often as possible. She will feed on demand,  respond to feeding cues, obtain latch, listen for audible swallowing and be aware of signs of oxytocin release/ cramping,thrist,sleepyness while breastfeeding.

## 2017-10-08 NOTE — PROGRESS NOTES
Problem: Vaginal Delivery: Postpartum Day 1  Goal: *Demonstrates progressive activity  Outcome: Progressing Towards Goal  Patient stated felt dizzy during first time oob post-birth. She states this has resolved and has been up ambulating with nursing x2 since episode without difficulties or complaints. Will continue to monitor. Reiterated need to call for assistance if she becomes lightheaded or dizzy while toileting, use fall precautions.

## 2017-10-08 NOTE — PROGRESS NOTES
0735-Bedside and Verbal shift change report given to MOHINDER Giron RN (oncoming nurse) by Rachael Michaud RN (offgoing nurse). Report included the following information SBAR, MAR and Jatin.

## 2017-10-08 NOTE — PROGRESS NOTES
1300-Verbal shift change report given to [facility] [employer] (oncoming nurse) by MOHINDER Butts RN (offgoing nurse). Report included the following information SBAR and MAR.

## 2017-10-08 NOTE — PROGRESS NOTES
Post-Partum Day Number 1 Progress Note    Ardia Lower     Assessment: Doing well, post partum day 1    Plan:  1. Continue routine postpartum and perineal care as well as maternal education. 2. The risks and benefits of the circumcision  procedure and anesthesia including: bleeding, infection, variability of cosmetic results were discussed at length with the mother. She gives informed consent to proceed as noted and her questions are answered. Information for the patient's :  Gracia Madrigal [[id number]]   Vaginal, Spontaneous Delivery   Patient doing well without significant complaint. Voiding without difficulty, normal lochia. Vitals:  Visit Vitals    /56 (BP 1 Location: Right arm, BP Patient Position: Supine)    Pulse 84    Temp 97.9 °F (36.6 °C)    Resp 16    Ht 5' 4\" (1.626 m)    Wt 102.5 kg (226 lb)    SpO2 99%    Breastfeeding Unknown    BMI 38.79 kg/m2     Temp (24hrs), Av.1 °F (36.7 °C), Min:97.8 °F (36.6 °C), Max:99 °F (37.2 °C)        Exam:   Patient without distress. Abdomen soft, fundus firm, nontender                Lower extremities are symmetric without tenderness, cords or erythema. Labs:     Lab Results   Component Value Date/Time    WBC 12.2 10/07/2017 12:45 AM    HGB 11.4 10/07/2017 12:45 AM    HCT 35.2 10/07/2017 12:45 AM    PLATELET 575  12:45 AM       No results found for this or any previous visit (from the past 24 hour(s)).

## 2017-10-08 NOTE — PROGRESS NOTES
TRANSFER - IN REPORT:    Verbal report received from Maggie FERRARI(name) on Sharon Hatchet  being received from L&D(unit) for routine progression of care      Report consisted of patients Situation, Background, Assessment and   Recommendations(SBAR). Information from the following report(s) SBAR, Kardex, Intake/Output, MAR and Recent Results was reviewed with the receiving nurse. Opportunity for questions and clarification was provided. Assessment completed upon patients arrival to unit and care assumed.

## 2017-10-08 NOTE — PROGRESS NOTES
Bedside and Verbal shift change report given to LEVON Srinivasan RN (oncoming nurse) by Jorge Alberto Iverson. Rob Mercedes RN  (offgoing nurse). Report included the following information SBAR.

## 2017-10-08 NOTE — L&D DELIVERY NOTE
Delivery Summary  Patient: Aleksandr Aagrwal             Circumcision:   desires  Additional Delivery Comments -  of male infant over 1st degree perineal laceration      Information for the patient's :  May Ramos [[id number]]       Labor Events:    Labor: No   Rupture Date: 10/6/2017   Rupture Time: 9:00 PM   Rupture Type SROM   Amniotic Fluid Volume: Moderate    Amniotic Fluid Description: Clear None   Induction: None       Augmentation: Oxytocin   Labor Events: None     Cervical Ripening:     None     Delivery Events:  Episiotomy: None   Laceration(s): First degree perineal     Repaired: Yes    Number of Repair Packets:     Suture Type and Size: Other 3-0 Vicryl rapide   Estimated Blood Loss (ml): 200ml       Delivery Date: 10/7/2017    Delivery Time: 8:45 PM  Delivery Type: Vaginal, Spontaneous Delivery  Sex:  Male     Gestational Age: 38w11d   Delivery Clinician:  Sharad Temple  Living Status: Living   Delivery Location: L&D            APGARS  One minute Five minutes Ten minutes   Skin color: 0   1        Heart rate: 2   2        Grimace: 2   2        Muscle tone: 2   2        Breathin   2        Totals: 8   9            Presentation: Vertex    Position:   Occiput Anterior  Resuscitation Method:  Suctioning-bulb;Suctioning-deep     Meconium Stained: None      Cord Vessels: 3 Vessels      Cord Events:    Cord Blood Sent?:  No    Blood Gases Sent?:  No    Placenta:  Date/Time: 10/7  8:48 PM  Removal: Spontaneous      Appearance: Normal;Intact     [place] Measurements:  Birth Weight:        Birth Length:        Head Circumference:        Chest Circumference:       Abdominal Girth:       Other Providers:   Gonzella Hodgkin, JENNIFER M;JADON WASHINGTON, Obstetrician;Primary Nurse;Primary  Nurse           Cord pH:  none    Episiotomy: None   Laceration(s): First degree perineal     Estimated Blood Loss (ml): 200    Labor Events  Method: None      Augmentation: Oxytocin Cervical Ripening:       None        Operative Vaginal Delivery - none    Group B Strep:   Lab Results   Component Value Date/Time    Limatrepanchito, External negative 09/15/2017     Information for the patient's :  Lala Pineda [[id number]]   No results found for: ABORH, PCTABR, PCTDIG, BILI, ABORHEXT, ABORH    No results found for: APH, APCO2, APO2, AHCO3, ABEC, ABDC, O2ST, EPHV, PCO2V, PO2V, HCO3V, EBEV, EBDV, SITE, RSCOM

## 2017-10-08 NOTE — PROGRESS NOTES
1945 Bedside and Verbal shift change report given to Josh Trinidad RN (oncoming nurse) by Peter Holguin RN (offgoing nurse). Report included the following information OBSBAR.   2304 Pt able to bear weight to stand at bedside, but feels very light-headed upon standing. VSS.    2312 Pt transferred in stable condition to  via stretcher. 2325 TRANSFER - OUT REPORT:    Verbal report given to TAMMY Hackett RN(name) on Hallie Alonso  being transferred to MIU(unit) for routine progression of care       Report consisted of patients Situation, Background, Assessment and   Recommendations(SBAR). Information from the following report(s) SBAR was reviewed with the receiving nurse. Lines:   Peripheral IV 10/07/17 Left Hand (Active)   Site Assessment Clean, dry, & intact 10/7/2017 11:24 PM   Phlebitis Assessment 0 10/7/2017 11:24 PM   Infiltration Assessment 0 10/7/2017 11:24 PM   Dressing Status Clean, dry, & intact 10/7/2017 11:24 PM   Dressing Type Transparent 10/7/2017 11:24 PM   Hub Color/Line Status Pink;Capped 10/7/2017 11:24 PM        Opportunity for questions and clarification was provided.       Patient transported with:   Registered Nurse

## 2017-10-09 VITALS
SYSTOLIC BLOOD PRESSURE: 123 MMHG | BODY MASS INDEX: 38.58 KG/M2 | TEMPERATURE: 98.5 F | WEIGHT: 226 LBS | DIASTOLIC BLOOD PRESSURE: 73 MMHG | HEIGHT: 64 IN | RESPIRATION RATE: 16 BRPM | OXYGEN SATURATION: 99 % | HEART RATE: 87 BPM

## 2017-10-09 PROCEDURE — 74011250637 HC RX REV CODE- 250/637: Performed by: SPECIALIST

## 2017-10-09 RX ORDER — IBUPROFEN 600 MG/1
600 TABLET ORAL
Qty: 40 TAB | Refills: 0 | Status: SHIPPED | OUTPATIENT
Start: 2017-10-09 | End: 2019-04-11

## 2017-10-09 RX ORDER — DOCUSATE SODIUM 100 MG/1
100 CAPSULE, LIQUID FILLED ORAL
Qty: 60 CAP | Refills: 0 | Status: SHIPPED | OUTPATIENT
Start: 2017-10-09 | End: 2019-04-11

## 2017-10-09 RX ORDER — OXYCODONE AND ACETAMINOPHEN 5; 325 MG/1; MG/1
1 TABLET ORAL
Qty: 12 TAB | Refills: 0 | Status: SHIPPED | OUTPATIENT
Start: 2017-10-09 | End: 2019-04-11

## 2017-10-09 RX ADMIN — ACETAMINOPHEN 650 MG: 325 TABLET, FILM COATED ORAL at 00:24

## 2017-10-09 RX ADMIN — Medication 1 TABLET: at 10:10

## 2017-10-09 NOTE — ROUTINE PROCESS
Bedside/shift change SBAR received from Elvis Bazan RN.    0005: Patient declined prescription for Percocet. Placed prescription in shredder. I have reviewed discharge instructions with the patient. The patient verbalized understanding.

## 2017-10-09 NOTE — LACTATION NOTE
This note was copied from a baby's chart. Mother reports Baby nursing well and has improved throughout post partum stay, deep latch maintained, mother is comfortable, milk is in transition, baby feeding vigorously with rhythmic suck, swallow, breathe pattern, with audible swallowing, and evident milk transfer, both breasts offerd, baby is asleep following feeding. Baby is feeding on demand, voiding and stools present as appropriate over the last 24 hours.

## 2017-10-09 NOTE — ADT AUTH CERT NOTES
Sangita Mendoza MD Physician Signed Obstetrics & Gynecology H&P Date of Service: 10/06/17 2340         []Hide copied text  []Rudiver for attribution information  Labor and Delivery Admission Note  10/6/2017     40 y.o., , female, G2 P 1002 Estimated Date of Delivery: 10/9/17 by dates and [address] presents with leakage of fluid  at 2220  Reports good fetal movement, no bleeding, and has mild contractions. States her water bag broke around 9 pm , clear fluid accompanied with mild contractions. Was seen in the office by Dr. Juanjo Momin and was 1/30/-3. Mentions having had palpitations in the earlier part of the pregnancy and had been evaluated by the cardiologist around 20 weeks with normal ECHO.     PNC: Blood type: A            RH: pos            Rubella:            SVII serology: neg             GBS status: neg       Past Medical History:   Diagnosis Date    Asthma       with illness; last needed inhaler > 1 year ago    Generalized anxiety disorder       last took medication 3 years ago    History of palpitations in adulthood 2015     saw Dr. Jeff Johnson 6/2017, echo normal, sinus tach on holter monitor , murmur noted on exam    OCD (obsessive compulsive disorder)      Panic disorder      Postpartum depression        History reviewed. No pertinent surgical history. OB/GYN: Dr. Amy Curry:          Current Facility-Administered Medications   Medication Dose Route Frequency    lactated ringers bolus infusion 500-1,000 mL  500-1,000 mL IntraVENous PRN    terbutaline (BRETHINE) injection 0.25 mg  0.25 mg SubCUTAneous PRN    nalbuphine (NUBAIN) injection 10 mg  10 mg IntraVENous Q2H PRN    fentaNYL citrate (PF) injection 100 mcg  100 mcg IntraVENous Q1H PRN      Allergies:          Allergies   Allergen Reactions    Macrobid [Nitrofurantoin Monohyd/M-Cryst] Other (comments)       Throat closed up      Pertinent ROS: per HPI          Family History   Problem Relation Age of Onset    Diabetes Mother      High Cholesterol Mother      Diabetes Father      Cancer Father         of the throat    Heart Disease Maternal Grandfather      Parkinson's Disease Paternal Grandmother      Hypertension Paternal Grandmother      Liver Disease Paternal Grandfather        Social History            Social History    Marital status:        Spouse name: N/A    Number of children: N/A    Years of education: N/A          Occupational History    Not on file.             Social History Main Topics    Smoking status: Former Smoker    Smokeless tobacco: Never Used         Comment: Quit 16 years ago smoked for 5 years    Alcohol use No    Drug use: No    Sexual activity: Yes       Partners: Male       Birth control/ protection: None           Other Topics Concern    Not on file      Social History Narrative         OBJECTIVE:  Gravid , female NAD  Temp (24hrs), Av °F (36.7 °C), Min:98 °F (36.7 °C), Max:98 °F (36.7 °C)          Visit Vitals    /76    Pulse 99    Temp 98 °F (36.7 °C)    Resp 16    Ht 5' 4\" (1.626 m)    Wt 102.5 kg (226 lb)    Breastfeeding No    BMI 38.79 kg/m2         Labs:  No results found for: WBC     Exam:  HEENT:  normal   Lungs:  clear  Cor:  RRR  Abdomen:  Fundal height 40 cm                    Soft between UC  Fetal heart rate tracin, moderate variability, +accels noted  Contraction pattern: every 3-5 mins  Cervix:  deferred  Fluid: Clear  Pelvimetry:  AP-good                      Arch- adequate                      Sidewalls- adequate                      Pelvis feels adequate for fetus.     Impression: 39 y/o @ 39 weeks 4 days with PROM  Admit to L&D  GBS-Negative  Encouraged ambulation  Pain management discussed  Pitocin augmentation discussed if no cervical change noted in 4-6 hrs     Cindi Simpson MD

## 2017-10-09 NOTE — PROGRESS NOTES
Post-Partum Day Number 2 Progress Note    Annia Melgar     Assessment: Doing well, post partum day 2    Plan:   1. Discharge to room in today (baby needs to stay for elevated bilirubin)  2. Follow up in office in 6 weeks with Taya Marrufo MD  3. Post partum activity advised, diet as tolerated  4. Discharge Medications: ibuprofen, percocet and medications prior to admission    Information for the patient's :  Laverne Sat [[id number]]   Vaginal, Spontaneous Delivery   Patient doing well without significant complaint. Voiding without difficulty, normal lochia. Vitals:  Visit Vitals    BP 99/68 (BP 1 Location: Right arm, BP Patient Position: At rest)    Pulse 90    Temp 97.7 °F (36.5 °C)    Resp 14    Ht 5' 4\" (1.626 m)    Wt 102.5 kg (226 lb)    SpO2 99%    Breastfeeding Unknown    BMI 38.79 kg/m2     Temp (24hrs), Av.2 °F (36.8 °C), Min:97.7 °F (36.5 °C), Max:98.6 °F (37 °C)      Exam:         Patient without distress. Abdomen soft, fundus firm, nontender                 Lower extremities are negative for swelling, cords or tenderness. Labs:     Lab Results   Component Value Date/Time    WBC 12.2 10/07/2017 12:45 AM    HGB 11.4 10/07/2017 12:45 AM    HCT 35.2 10/07/2017 12:45 AM    PLATELET 446  12:45 AM       No results found for this or any previous visit (from the past 24 hour(s)).

## 2017-10-09 NOTE — ADT AUTH CERT NOTES
Cristofer Henson MD Physician Signed Obstetrics & Gynecology Progress Notes Date of Service: 10/09/17 6201      Expand All Collapse All    []Hide copied text  []Hover for attribution information  Post-Partum Day Number 2 Progress Note     Sarai Garcia      Assessment: Doing well, post partum day 2     Plan:   1. Discharge to room in today (baby needs to stay for elevated bilirubin)  2. Follow up in office in 6 weeks with Cristofer Henson MD  3. Post partum activity advised, diet as tolerated  4. Discharge Medications: ibuprofen, percocet and medications prior to admission     Information for the patient's :  Qing Fix [[id number]]   Vaginal, Spontaneous Delivery   Patient doing well without significant complaint. Voiding without difficulty, normal lochia.     Vitals:       Visit Vitals    BP 99/68 (BP 1 Location: Right arm, BP Patient Position: At rest)    Pulse 90    Temp 97.7 °F (36.5 °C)    Resp 14    Ht 5' 4\" (1.626 m)    Wt 102.5 kg (226 lb)    SpO2 99%    Breastfeeding Unknown    BMI 38.79 kg/m2      Temp (24hrs), Av.2 °F (36.8 °C), Min:97.7 °F (36.5 °C), Max:98.6 °F (37 °C)        Exam:                              Patient without distress.                  Abdomen soft, fundus firm, nontender                 Lower extremities are negative for swelling, cords or tenderness.     Labs:            Lab Results   Component Value Date/Time     WBC 12.2 10/07/2017 12:45 AM     HGB 11.4 10/07/2017 12:45 AM     HCT 35.2 10/07/2017 12:45 AM     PLATELET 016  12:45 AM

## 2017-10-09 NOTE — ADT AUTH CERT NOTES
Silvano Bee MD Physician Signed Obstetrics & Gynecology L&D Delivery Note Date of Service: 10/07/17 2118         []Hide copied text  []Rudiver for attribution information   Delivery Summary  Patient: Mable Trinidad                                                 Circumcision:   desires  Additional Delivery Comments -  of male infant over 1st degree perineal laceration        Information for the patient's :  Orgill Esmerpriya [[id number]]         Labor Events:              Labor: No   Rupture Date: 10/6/2017    Rupture Time: 9:00 PM    Rupture Type SROM    Amniotic Fluid Volume:  Moderate     Amniotic Fluid Description: Clear None    Induction: None        Augmentation: Oxytocin    Labor Events: None      Cervical Ripening:     None       Delivery Events:          Episiotomy: None   Laceration(s): First degree perineal     Repaired: Yes    Number of Repair Packets:      Suture Type and Size: Other 3-0 Vicryl rapide   Estimated Blood Loss (ml): 200ml         Delivery Date: 10/7/2017    Delivery Time: 8:45 PM  Delivery Type: Vaginal, Spontaneous Delivery  Sex:  Male     Gestational Age: 38w11d   Delivery Clinician:  Silvano Bee  Living Status: Living   Delivery Location: L&D             APGARS  One minute Five minutes Ten minutes   Skin color: 0   1        Heart rate: 2   2        Grimace: 2   2        Muscle tone: 2   2        Breathin   2        Totals: 8   9              Presentation: Vertex    Position:   Occiput Anterior  Resuscitation Method:  Suctioning-bulb;Suctioning-deep     Meconium Stained: None       Cord Vessels: 3 Vessels       Cord Events:    Cord Blood Sent?:  No    Blood Gases Sent?:  No     Placenta:  Date/Time: 10/7  8:48 PM       Removal: Spontaneous      Appearance: Normal;Intact       Measurements:  Birth Weight:        Birth Length:        Head Circumference:        Chest Circumference:       Abdominal Girth:       Other Providers:   Shaye Clark STEFFI; Warden Lala, Obstetrician;Primary Nurse;Primary [place] Nurse               Cord pH:  none     Episiotomy: None   Laceration(s): First degree perineal     Estimated Blood Loss (ml): 200     Labor Events  Method: None      Augmentation: Oxytocin   Cervical Ripening:       None          Operative Vaginal Delivery - none     Group B Strep:         Lab Results   Component Value Date/Time     GrBStrep, External negative 09/15/2017      Information for the patient's :  Evy Brazil [[id number]]   No results found for: ABORH, PCTABR, PCTDIG, BILI, ABORHEXT, ABORH     No results found for: APH, APCO2, APO2, AHCO3, ABEC, ABDC, O2ST, EPHV, PCO2V, PO2V, HCO3V, EBEV, EBDV, SITE, RSCOM

## 2017-10-09 NOTE — DISCHARGE INSTRUCTIONS
POSTPARTUM DISCHARGE INSTRUCTIONS       Name:  Fausto Mullen  YOB: 1980  Admission Diagnosis:  PROM (premature rupture of membranes)     Discharge Diagnosis:    Problem List as of 10/9/2017  Never Reviewed          Codes Class Noted - Resolved    PROM (premature rupture of membranes) ICD-10-CM: O42.90  ICD-9-CM: 658.10  10/6/2017 - Present        Palpitations ICD-10-CM: R00.2  ICD-9-CM: 785.1  6/1/2017 - Present        Shortness of breath ICD-10-CM: R06.02  ICD-9-CM: 786.05  6/1/2017 - Present        Dizziness ICD-10-CM: R42  ICD-9-CM: 780.4  6/1/2017 - Present            Attending Physician:  Milli Griffith MD    Delivery Type:  Vaginal Childbirth with Episiotomy, Laceration or Tear: What To Expect At [address] [place] [address] body will slowly heal in the next few weeks. It is easy to get too tired and overwhelmed during the first weeks after your baby is born. Changes in your hormones can shift your mood without warning. You may find it hard to meet the extra demands on your energy and time. Take it easy on yourself. Follow-up care is a key part of your treatment and safety. Be sure to make and go to all appointments, and call your doctor if you are having problems. It's also a good idea to know your test results and keep a list of the medicines you take. How can you care for yourself at home? Vaginal Bleeding and Cramps  · After delivery, you will have a bloody discharge from the vagina. This will turn pink within a week and then white or yellow after about 10 days. It may last for 2 to 4 weeks or longer, until the uterus has healed. Use pads instead of tampons until you stop bleeding. · Do not worry if you pass some blood clots, as long as they are smaller than a golf ball. If you have a tear or stitches in your vaginal area, change the pad at least every 4 hours to prevent soreness and infection. · You may have cramps for the first few days after childbirth.  These are normal and occur as the uterus shrinks to normal size. Take an over-the-counter pain medicine, such as acetaminophen (Tylenol), ibuprofen (Advil, Motrin), or naproxen (Aleve), for cramps. Read and follow all instructions on the label. Do not take aspirin, because it can cause more bleeding. Do not take acetaminophen (Tylenol) and other acetaminophen containing medications (i.e. Percocet) at the same time. Episiotomy, Lacerations or Tears  · If you have stitches, they will dissolve on their own and do not need to be removed. · Put ice or a cold pack on your painful area for 10 to 20 minutes at a time, several times a day, for the first few days. Put a thin cloth between the ice and your skin. · Sit in a few inches of warm water (sitz bath) 3 times a day and after bowel movements. The warm water helps with pain and itching. If you do not have a tub, a warm shower might help. Breast fullness  · Your breasts may overfill (engorge) in the first few days after delivery. To help milk flow and to relieve pain, warm your breasts in the shower or by using warm, moist towels before nursing. · If you are not nursing, do not put warmth on your breasts or touch your breasts. Wear a tight bra or sports bra and use ice until the fullness goes away. This usually takes 2 to 3 days. · Put ice or a cold pack on your breast after nursing to reduce swelling and pain. Put a thin cloth between the ice and your skin. Activity  · Eat a balanced diet. Do not try to lose weight by cutting calories. Keep taking your prenatal vitamins, or take a multivitamin. · Get as much rest as you can. Try to take naps when your baby sleeps during the day. · Get some exercise every day. But do not do any heavy exercise until your doctor says it is okay. · Wait until you are healed (about 4 to 6 weeks) before you have sexual intercourse. Your doctor will tell you when it is okay to have sex. · Talk to your doctor about birth control.  You can get pregnant even before your period returns. Also, you can get pregnant while you are breast-feeding. Mental Health  · Many women get the \"baby blues\" during the first few days after childbirth. You may lose sleep, feel irritable, and cry easily. You may feel happy one minute and sad the next. Hormone changes are one cause of these emotional changes. Also, the demands of a new baby, along with visits from relatives or other family needs, add to a mother's stress. The \"baby blues\" often peak around the fourth day. Then they ease up in less than 2 weeks. · If your moodiness or anxiety lasts for more than 2 weeks, or if you feel like life is not worth living, you may have postpartum depression. This is different for each mother. Some mothers with serious depression may worry intensely about their infant's well-being. Others may feel distant from their child. Some mothers might even feel that they might harm their baby. A mother may have signs of paranoia, wondering if someone is watching her. · With all the changes in your life, you may not know if you are depressed. Pregnancy sometimes causes changes in how you feel that are similar to the symptoms of depression. · Symptoms of depression include:  · Feeling sad or hopeless and losing interest in daily activities. These are the most common symptoms of depression. · Sleeping too much or not enough. · Feeling tired. You may feel as if you have no energy. · Eating too much or too little. · POSTPARTUM [employer]ATIONAL (PSI) offers a Warm line; Chat with the Expert phone sessions; Information and Articles about Pregnancy and Postpartum Mood Disorders; Comprehensive List of Free Support Groups; Knowledgeable local coordinators who will offer support, information, and resources; Guide to Resources on [place]; Calendar of events in the  mood disorders community; Latest News and Research; and [facility] & [facility] [address] for [employer].  Remember - You are not alone; You are not to blame; With help, you will be well. 0-448-945-PPD(5645). WWW. POSTPARTUM. NET    · Writing or talking about death, such as writing suicide notes or talking about guns, knives, or pills. Keep the numbers for these national suicide hotlines: 6-698-958-TALK (1-514.421.3557) and 6-252-NFYVCDH (2-607.817.7905). If you or someone you know talks about suicide or feeling hopeless, get help right away. Constipation and Hemorrhoids  Drink plenty of fluids, enough so that your urine is light yellow or clear like water. If you have kidney, heart, or liver disease and have to limit fluids, talk with your doctor before you increase the amount of fluids you drink. · Eat plenty of fiber each day. Have a bran muffin or bran cereal for breakfast, and try eating a piece of fruit for a mid-afternoon snack. · For painful, itchy hemorrhoids, put ice or a cold pack on the area several times a day for 10 minutes at a time. Follow this by putting a warm compress on the area for another 10 to 20 minutes or by sitting in a shallow, warm bath. When should you call for help? Call 911 anytime you think you may need emergency care. For example, call if:  · You are thinking of hurting yourself, your baby, or anyone else. · You passed out (lost consciousness). · You have symptoms of a blood clot in your lung (called a pulmonary embolism). These may include:  · Sudden chest pain. · Trouble breathing. · Coughing up blood. Call your doctor now or seek immediate medical care if:  · You have severe vaginal bleeding. · You are soaking through a pad each hour for 2 or more hours. · Your vaginal bleeding seems to be getting heavier or is still bright red 4 days after delivery. · You are dizzy or lightheaded, or you feel like you may faint. · You are vomiting or cannot keep fluids down. · You have a fever. · You have new or more belly pain. · You pass tissue (not just blood).   · Your vaginal discharge smells bad.  · Your belly feels tender or full and hard. · Your breasts are continuously painful or red. · You feel sad, anxious, or hopeless for more than a few days. · You have sudden, severe pain in your belly. · You have symptoms of a blood clot in your leg (called a deep vein thrombosis), such as:  · Pain in your calf, back of the knee, thigh, or groin. · Redness and swelling in your leg or groin. · You have symptoms of preeclampsia, such as:  · Sudden swelling of your face, hands, or feet. · New vision problems (such as dimness or blurring). · A severe headache. · Your blood pressure is higher than it should be or rises suddenly. · You have new nausea or vomiting. Watch closely for changes in your health, and be sure to contact your doctor if you have any problems. Additional Information:  Postpartum Support    PARENTS:  Are you feeling sad or depressed? Is it difficult for you to enjoy yourself? Do you feel more irritable or tense? Do you feel anxious or panicky? Are you having difficulty bonding with your baby? Do you feel as if you are \"out of control\" or \"going crazy\"? Are you worried that you might hurt your baby or yourself? FAMILIES: Do you worry that something is wrong but don't know how to help? Do you think that your partner or spouse is having problems coping? Are you worried that it may never get better? While many women experience some mild mood change or \"the blues\" during or after the birth of a child, 1 in 9 women experience more significant symptoms of depression or anxiety. 1 in 10 Dads become depressed during the first year. Things you can do  Being a good parent includes taking care of yourself. If you take care of yourself, you will be able to take better care of your baby and your family. · Talk to a counselor or healthcare provider who has training in  mood and anxiety problems.   · Learn as much as you can about pregnancy and postpartum depression and anxiety. · Get support from family and friends. Ask for help when you need it. · Join a support group in your area or online. · Keep active by walking, stretching or whatever form of exercise helps you to feel better. · Get enough rest and time for yourself. · Eat a healthy diet. · Don't give up! It may take more than one try to get the right help you need. These are general instructions for a healthy lifestyle:    No smoking/ No tobacco products/ Avoid exposure to second hand smoke    Surgeon General's Warning:  Quitting smoking now greatly reduces serious risk to your health. Obesity, smoking, and sedentary lifestyle greatly increases your risk for illness    A healthy diet, regular physical exercise & weight monitoring are important for maintaining a healthy lifestyle    Recognize signs and symptoms of STROKE:    F-face looks uneven    A-arms unable to move or move unevenly    S-speech slurred or non-existent    T-time-call 911 as soon as signs and symptoms begin - DO NOT go       back to bed or wait to see if you get better - TIME IS BRAIN. I have had the opportunity to make my options or choices for discharge. I have received and understand these instructions.

## 2017-10-09 NOTE — ADT AUTH CERT NOTES
Nadine Peters MD Physician Signed Obstetrics & Gynecology Progress Notes Date of Service: 10/08/17 0800      Expand All Collapse All    []Hide copied text  []Hover for attribution information  Post-Partum Day Number 1 Progress Note     Alison Butt      Assessment: Doing well, post partum day 1     Plan:  1. Continue routine postpartum and perineal care as well as maternal education. 2. The risks and benefits of the circumcision  procedure and anesthesia including: bleeding, infection, variability of cosmetic results were discussed at length with the mother. She gives informed consent to proceed as noted and her questions are answered.      Information for the patient's :  Roddy Sierra [[id number]]   Vaginal, Spontaneous Delivery   Patient doing well without significant complaint. Voiding without difficulty, normal lochia.     Vitals:       Visit Vitals    /56 (BP 1 Location: Right arm, BP Patient Position: Supine)    Pulse 84    Temp 97.9 °F (36.6 °C)    Resp 16    Ht 5' 4\" (1.626 m)    Wt 102.5 kg (226 lb)    SpO2 99%    Breastfeeding Unknown    BMI 38.79 kg/m2      Temp (24hrs), Av.1 °F (36.7 °C), Min:97.8 °F (36.6 °C), Max:99 °F (37.2 °C)           Exam:   Patient without distress.                 Abdomen soft, fundus firm, nontender                Lower extremities are symmetric without tenderness, cords or erythema.     Labs:            Lab Results   Component Value Date/Time     WBC 12.2 10/07/2017 12:45 AM     HGB 11.4 10/07/2017 12:45 AM     HCT 35.2 10/07/2017 12:45 AM     PLATELET 043  12:45 AM

## 2017-10-09 NOTE — ADT AUTH CERT NOTES
Nicholas Matias MD Physician Signed Obstetrics & Gynecology Progress Notes Date of Service: 10/07/17 1642         []Hide copied text  []Rudiver for attribution information  Labor Progress Note  Patient seen, fetal heart rate and contraction pattern evaluated, patient examined. Patient Vitals for the past 1 hrs:    BP Temp Pulse Resp   10/07/17 1547 120/79 98.2 °F (36.8 °C) 73 18         Physical Exam:  Cervical Exam:  4 cm dilated    60% effaced    -3 station    Presenting Part: cephalic  Membranes:  Spontaneous Rupture of Membranes; Amniotic Fluid: clear fluid  Uterine Activity: every 5 minutes, mild to moderate  Fetal Heart Rate: Reactive     Assessment/Plan:  Reassuring fetal surveillance  Still in latent labor; patient educated regarding expectations for labor progression - despite PROM, has not been in labor, has only had 4 hours of pitocin, should consider coping and pain management strategies for ensuing 12+ hours.    Re-check in approximately 6 hours, sooner as indicated  Consider epidural

## 2017-10-09 NOTE — ADT AUTH CERT NOTES
Terrel Denver, MD Physician Signed Obstetrics & Gynecology Progress Notes Date of Service: 10/07/17 1034         []Hide copied text  []Rudiver for attribution information  Patient seen, chart reviewed. Briefly;  with history of prior 8lb 15 oz  admitted with SROM 12 hours ago. Since then very few contractions. Patient is currently without complaints. Patient Vitals for the past 4 hrs:    Temp Pulse Resp BP   10/07/17 0745 97.8 °F (36.6 °C) 93 16 94/50       Gen:  NAD  Abd:  Soft, nontender, gravid  Cervix:  Deferred; 3/30%/posterior / soft at 4:15a per RN  EFW 8.5#     Cat 1 FHR tracing  Irregular mild CTX     Counseling regarding unlikely spontaneous onset of labor now that 12 hours have passed since SROM. Encouraged and educated patient / family on role of pitocin in this setting.   She agrees to proceed.     Plan:  Initiate pitocin now  Epidural prn  GBS negative  RH +

## 2017-10-11 NOTE — DISCHARGE SUMMARY
Obstetrical Discharge Summary     Name: Sammy Dawson MRN: [id number]  SSN: [id number]    YOB: 1980  Age: 40 y.o. Sex: female      Admit Date: 10/6/2017    Discharge Date: 10/9/2017     Admitting Physician: Clara Sims MD     Attending Physician:      Admission Diagnoses: PROM (premature rupture of membranes)    Discharge Diagnoses:   Information for the patient's :  Rosibel Marquez [[id number]]   Delivery of a 3.44 kg male infant via Vaginal, Spontaneous Delivery on 10/7/2017 at 8:45 PM  by . Apgars were 8 and 9. Additional Diagnoses:   Hospital Problems  Never Reviewed          Codes Class Noted POA    PROM (premature rupture of membranes) ICD-10-CM: O42.90  ICD-9-CM: 658.10  10/6/2017 Unknown             Lab Results   Component Value Date/Time    Rubella, External immune 2017    GrBStrep, External negative 09/15/2017       Hospital Course: Normal hospital course following the delivery. Disposition at Discharge: Home or self care    Discharged Condition: Stable    Patient Instructions:   Cannot display discharge medications since this patient is not currently admitted. Reference my discharge instructions.     Follow-up Appointments   Procedures    FOLLOW UP VISIT Appointment in: 6 Weeks     Standing Status:   Standing     Number of Occurrences:   1     Order Specific Question:   Appointment in     Answer:   6 Weeks        Signed By:  Nisreen Roque MD     2017

## 2019-01-03 ENCOUNTER — HOSPITAL ENCOUNTER (OUTPATIENT)
Dept: ULTRASOUND IMAGING | Age: 39
Discharge: HOME | End: 2019-01-03
Attending: FAMILY MEDICINE
Payer: COMMERCIAL

## 2019-01-03 DIAGNOSIS — R10.11 RIGHT UPPER QUADRANT PAIN: ICD-10-CM

## 2019-01-03 PROCEDURE — 76705 ECHO EXAM OF ABDOMEN: CPT

## 2019-01-11 ENCOUNTER — OFFICE VISIT (OUTPATIENT)
Dept: FAMILY MEDICINE CLINIC | Age: 39
End: 2019-01-11

## 2019-01-11 VITALS
SYSTOLIC BLOOD PRESSURE: 100 MMHG | HEART RATE: 80 BPM | DIASTOLIC BLOOD PRESSURE: 61 MMHG | OXYGEN SATURATION: 97 % | BODY MASS INDEX: 35 KG/M2 | RESPIRATION RATE: 18 BRPM | WEIGHT: 205 LBS | TEMPERATURE: 98.2 F | HEIGHT: 64 IN

## 2019-01-11 DIAGNOSIS — Z86.59 HISTORY OF PANIC ATTACKS: ICD-10-CM

## 2019-01-11 DIAGNOSIS — R52 BURNING PAIN: ICD-10-CM

## 2019-01-11 DIAGNOSIS — L30.9 ECZEMA, UNSPECIFIED TYPE: ICD-10-CM

## 2019-01-11 DIAGNOSIS — R11.0 NAUSEA: ICD-10-CM

## 2019-01-11 DIAGNOSIS — Z13.220 LIPID SCREENING: ICD-10-CM

## 2019-01-11 DIAGNOSIS — R00.2 PALPITATIONS: ICD-10-CM

## 2019-01-11 DIAGNOSIS — E66.01 SEVERE OBESITY (HCC): ICD-10-CM

## 2019-01-11 DIAGNOSIS — R10.11 RUQ PAIN: ICD-10-CM

## 2019-01-11 DIAGNOSIS — Z86.59 HISTORY OF OCD (OBSESSIVE COMPULSIVE DISORDER): ICD-10-CM

## 2019-01-11 DIAGNOSIS — Z00.00 ENCOUNTER FOR PREVENTIVE CARE: Primary | ICD-10-CM

## 2019-01-11 RX ORDER — TRIAMCINOLONE ACETONIDE 1 MG/G
OINTMENT TOPICAL 2 TIMES DAILY
Qty: 30 G | Refills: 2 | Status: SHIPPED | OUTPATIENT
Start: 2019-01-11 | End: 2019-04-11

## 2019-01-11 NOTE — PROGRESS NOTES
1002 [facility] [place]. [place], [address] 
[phone] Date of visit: 1/11/19 Subjective:  
  
History obtained from:  the patient. Wiley Walton is a 45 y.o. female who presents today for new patient, needs pcp closer to where she lives Due for preventive care as well Starting having some issues 2 mo ago with pain RUQ, sometimes radiated to ehr back or down to RULQ Progressively got worse Saw Dr. Roshan Rueda at [facility] (sp?) That was around 12/28 Had ultrasound, was told no gallstones He wanted her to have hida, scheduled for 1/25, at [facility] However is getting better, her symptoms are improving Was having episodes after she ate where she would have nausea, weakness and generally felt awful for about 1 hour In Dec took a preg test that was neg and has had cycle 2x since then Has cut back fat in her diet and seems to have helped, 90% better The pains were not really with eating like the nausea the pain was mild but constant for a couple of weeks, not severe pain The pain came first, then later got the nausea/yuck feelin when she eats, really feels awful Never throws up Not sure what foods trigger it Never tried antacid, doesn't feel like heartburn Just ate chipoltle, doesn't think spicy food exacerbates that Ultrasound did show fatty liver Wants to get healthy Works at [employer] as a nurse, 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, days Busy as a nurse and mom but no structured exercise Has a treadmill, could do that in the mornings Says she needs consistency with better eating Trying to do smaller portions, lower fat No fast food Increase fruits and veggies Only drinking water and unsweet tea, gave up soda over a year ago Has been working on this for a few weeks and thinks she has lost some weight and feels better Thinks right side has very different shape from left No back pain or consistent joint pains Sometimes pain around right shoulder blade,  massages and can feel ligament or something moving Does get sore on right side, more in the ribs, not the hip Periods are regular, just using condoms Not planning a pregnancy but could happen Has a 17 month old  , also a 12year old Developed OCD/panic/anxiety in her late 25s, was on med for 2 years (zoloft and wellbutrin worked) Doing well now Also gained weight on med so doesn't want to take it Her  is able to help her Doing ok now Not having a lot of obsessive thoughts; her main phobia is medication Rarely takes a pill Used to see a therapist but not now, did help but not really need it now For past 2 months burning feeling on top of right foot only, lasts 15 min and is painful Hasn't been able to relate it to a pair of shoes Can be sitting there and start burning Felt it months ago when she was pregnant on left leg Itchy rash on both anterior shins for about a year History of eczema on hands many years ago Had mammo in Sept, everything was fine, had a lump Pap 2/17 Doesn't take flu shot due to fear of putting meds in her body Has to wear a mask at work Had cardiac work up for palpitations while pregnant Echo and holter were fine, just sinus tach Still feels at times Cutting back on caffeine helps Not causing other symptoms Patient Active Problem List  
 Diagnosis Date Noted  Severe obesity ([facility] [address] 01/12/2019  Burning pain 01/12/2019  Eczema 01/12/2019  Nausea 01/11/2019  
 History of OCD (obsessive compulsive disorder) 01/11/2019  RUQ pain 01/11/2019  
 History of panic attacks 01/11/2019  
 PROM (premature rupture of membranes) 10/06/2017  Palpitations 06/01/2017  Shortness of breath 06/01/2017  Dizziness 06/01/2017 Current Outpatient Medications Medication Sig Dispense Refill  triamcinolone acetonide (KENALOG) 0.1 % ointment Apply  to affected area two (2) times a day. use thin layer 30 g 2  ibuprofen (MOTRIN) 600 mg tablet Take 1 Tab by mouth every six (6) hours as needed for Pain. 40 Tab 0  
 oxyCODONE-acetaminophen (PERCOCET) 5-325 mg per tablet Take 1 Tab by mouth every four (4) hours as needed for Pain. Max Daily Amount: 6 Tabs. 12 Tab 0  
 docusate sodium (COLACE) 100 mg capsule Take 1 Cap by mouth two (2) times daily as needed for Constipation. 60 Cap 0  
 PNV FM.87/CUWSCOJ FUM/FOLIC AC (PRENATAL PO) Take 1 Tab by mouth daily. Allergies Allergen Reactions  Macrobid [Nitrofurantoin Monohyd/M-Cryst] Other (comments) Throat closed up Past Medical History:  
Diagnosis Date  Asthma   
 with illness; last needed inhaler > 1 year ago  Generalized anxiety disorder   
 last took medication 3 years ago  History of palpitations in adulthood 2015  
 saw Dr. Caryle Chol 6/2017, echo normal, sinus tach on holter monitor , murmur noted on exam  
 OCD (obsessive compulsive disorder)  Panic disorder  Postpartum depression History reviewed. No pertinent surgical history. Family History Problem Relation Age of Onset  Diabetes Mother  High Cholesterol Mother  Diabetes Father  Cancer Father   
     of the throat  Heart Disease Maternal Grandfather  Parkinson's Disease Paternal Grandmother  Hypertension Paternal Grandmother  Liver Disease Paternal Grandfather Primary sclerosis cholangitis Social History Tobacco Use  Smoking status: Former Smoker  Smokeless tobacco: Never Used  Tobacco comment: Quit 16 years ago smoked for 5 years Substance Use Topics  Alcohol use: No  
  
Social History Social History Narrative Lives with  Has 2 kids Works as nurse at [employer] Card: denies chest pain, still has some palpitations (has had holter that showed sinus tach) Endo: admits to recent weight loss with diet  denies urinary pain All: denies allergies ENT denies sinus problems Neuro: denies headaches Psych: admits to anxiety, used to be on med for anxiety/ocd/panic 
Msk: denies joint pains GI admits to constipation recently but also has bouts of diarrhea, watery (normal for her is BM every 3 days but feels a bit constipated), denies hematochezia Pulm: denies shortness of breath or cough PHQ over the last two weeks 1/11/2019 Little interest or pleasure in doing things Not at all Feeling down, depressed, irritable, or hopeless Not at all Total Score PHQ 2 0 Objective:  
 
Vitals:  
 01/11/19 1355 BP: 100/61 Pulse: 80 Resp: 18 Temp: 98.2 °F (36.8 °C) TempSrc: Oral  
SpO2: 97% Weight: 205 lb (93 kg) Height: 5' 4\" (1.626 m) Body mass index is 35.19 kg/m². General: stated age,obese, and in NAD Eyes: PERRL, EOMI, no redness or drainage Nose: no drainage Mouth: no lesions Throat: no erythema, exudate or swelling Neck: supple, symmetrical, trachea midline, no adenopathy and thyroid: not enlarged, symmetric, no tenderness/mass/nodules Lungs:  clear to auscultation w/o rales, rhonchi, wheezes w/normal effort and no use of accessory muscles of respiration Heart: regular rate and rhythm, S1, S2 normal, 2/6 systolic murmur LLSB, click, rub or gallop Abdomen: soft, mildly tender right flank without guarding or rebound, no masses. I am not able to appreciate significant asymmetry in her abdominal or rib shape laying down or standing up Ext:  No edema noted. Lymph: no cervical adenopathy appreciated Skin:  Right anterior shin with erythematous scaly tiny papules in a line consistent with eczema or possibly psoriasis Neuro: normal gait, CN 2-12 intact Psych: alert and oriented to person, place, time and situation and Speech: appropriate quality, quantity and organization of sentences and normal affect Assessment/Plan: ICD-10-CM ICD-9-CM 1. Encounter for preventive care Z00.00 V70.0 CBC WITH AUTOMATED DIFF  
   LIPID PANEL  
   METABOLIC PANEL, COMPREHENSIVE T4, FREE  
   TSH 3RD GENERATION  
   VITAMIN B12  
   HEMOGLOBIN A1C WITH EAG 2. Nausea R11.0 787.02 CBC WITH AUTOMATED DIFF  
   METABOLIC PANEL, COMPREHENSIVE URINALYSIS W/ RFLX MICROSCOPIC 3. RUQ pain R10.11 789.01 URINALYSIS W/ RFLX MICROSCOPIC 4. Palpitations R00.2 785.1 5. Severe obesity ([address] E66.01 278.01   
6. Burning pain R52 780.96 T4, FREE  
   TSH 3RD GENERATION  
   VITAMIN B12  
7. History of OCD (obsessive compulsive disorder) Z86.59 V11.2 8. History of panic attacks Z86.59 V11.8 9. Lipid screening Z13.220 V77.91 LIPID PANEL 10. Eczema, unspecified type L30.9 692.9 Orders Placed This Encounter  CBC WITH AUTOMATED DIFF  
 LIPID PANEL  
 METABOLIC PANEL, COMPREHENSIVE  T4, FREE  
 TSH 3RD GENERATION  
 VITAMIN B12  
 HEMOGLOBIN A1C WITH EAG  
 URINALYSIS W/ RFLX MICROSCOPIC  CVD REPORT  triamcinolone acetonide (KENALOG) 0.1 % ointment The nausea and RUQ pain are better but not completely resolved with improving diet Still to get hida later this month (US was normal) Recommended 2 week course of PPI as an experiment to see if that resolves her symptoms She is really eager to improve her health, exercise regularly, eat well, get to healthy weight 
encoruaged her in her plans, which sound very reasonable Discussed that accountability partner may be helpful Declines vaccines Pap up to date Routine labs today The burning pain on top of foot likely a small peripheral nerve irritation or impingement As such a small location doubt anything serious Advised comfortable shoes, let me know if spreading Discussed we could use med for her comfort if needed, she prefers to avoid all meds and can live with it Her ocd/panic are doing well currently without meds Not currently in therapy but very supportive  helpful Continue good moisturizer for leg rash which is likely eczema possibly psoriasis, add steroid cream prn Discussed the diagnosis and plan and she expressed understanding. Follow-up Disposition: 
Return in about 3 months (around 4/11/2019). to check all problems but especially her weight loss, lifestyle changes. Goal is to get to 190 by then.  
 
Gracie Sandoval MD

## 2019-01-11 NOTE — PATIENT INSTRUCTIONS
Starting a Weight Loss Plan: Care Instructions Your Care Instructions If you are thinking about losing weight, it can be hard to know where to start. Your doctor can help you set up a weight loss plan that best meets your needs. You may want to take a class on nutrition or exercise, or join a weight loss support group. If you have questions about how to make changes to your eating or exercise habits, ask your doctor about seeing a registered dietitian or an exercise specialist. 
It can be a big challenge to lose weight. But you do not have to make huge changes at once. Make small changes, and stick with them. When those changes become habit, add a few more changes. If you do not think you are ready to make changes right now, try to pick a date in the future. Make an appointment to see your doctor to discuss whether the time is right for you to start a plan. Follow-up care is a key part of your treatment and safety. Be sure to make and go to all appointments, and call your doctor if you are having problems. It's also a good idea to know your test results and keep a list of the medicines you take. How can you care for yourself at home? · Set realistic goals. Many people expect to lose much more weight than is likely. A weight loss of 5% to 10% of your body weight may be enough to improve your health. · Get family and friends involved to provide support. Talk to them about why you are trying to lose weight, and ask them to help. They can help by participating in exercise and having meals with you, even if they may be eating something different. · Find what works best for you. If you do not have time or do not like to cook, a program that offers meal replacement bars or shakes may be better for you. Or if you like to prepare meals, finding a plan that includes daily menus and recipes may be best. 
· Ask your doctor about other health professionals who can help you achieve your weight loss goals. ? A dietitian can help you make healthy changes in your diet. ? An exercise specialist or  can help you develop a safe and effective exercise program. 
? A counselor or psychiatrist can help you cope with issues such as depression, anxiety, or family problems that can make it hard to focus on weight loss. · Consider joining a support group for people who are trying to lose weight. Your doctor can suggest groups in your area. Where can you learn more? Go to http://maria alejandra-justin.info/. Enter O799 in the search box to learn more about \"Starting a Weight Loss Plan: Care Instructions. \" Current as of: June 26, 2018 Content Version: 11.8 © 8171-0841 [employer], [employer]. Care instructions adapted under license by [employer] (which disclaims liability or warranty for this information). If you have questions about a medical condition or this instruction, always ask your healthcare professional. [address] any warranty or liability for your use of this information.

## 2019-01-11 NOTE — PROGRESS NOTES
Chief Complaint Patient presents with  New Patient  
  estabish care  Abdominal Pain  
  upper right abd pain with nausea 1. Have you been to the ER, urgent care clinic since your last visit? Hospitalized since your last visit? No 
 
2. Have you seen or consulted any other health care providers outside of the [address] [place] [address] since your last visit? Include any pap smears or colon screening. Saw dr. Eli Nicole on 12/28/18

## 2019-01-12 PROBLEM — R52 BURNING PAIN: Status: ACTIVE | Noted: 2019-01-12

## 2019-01-12 PROBLEM — E66.01 SEVERE OBESITY (HCC): Status: ACTIVE | Noted: 2019-01-12

## 2019-01-12 PROBLEM — L30.9 ECZEMA: Status: ACTIVE | Noted: 2019-01-12

## 2019-01-12 LAB
ALBUMIN SERPL-MCNC: 4.6 G/DL (ref 3.5–5.5)
ALBUMIN/GLOB SERPL: 1.6 {RATIO} (ref 1.2–2.2)
ALP SERPL-CCNC: 84 IU/L (ref 39–117)
ALT SERPL-CCNC: 23 IU/L (ref 0–32)
APPEARANCE UR: CLEAR
AST SERPL-CCNC: 20 IU/L (ref 0–40)
BASOPHILS # BLD AUTO: 0 X10E3/UL (ref 0–0.2)
BASOPHILS NFR BLD AUTO: 0 %
BILIRUB SERPL-MCNC: 0.4 MG/DL (ref 0–1.2)
BILIRUB UR QL STRIP: NEGATIVE
BUN SERPL-MCNC: 8 MG/DL (ref 6–20)
BUN/CREAT SERPL: 10 (ref 9–23)
CALCIUM SERPL-MCNC: 9.2 MG/DL (ref 8.7–10.2)
CHLORIDE SERPL-SCNC: 106 MMOL/L (ref 96–106)
CHOLEST SERPL-MCNC: 150 MG/DL (ref 100–199)
CO2 SERPL-SCNC: 23 MMOL/L (ref 20–29)
COLOR UR: YELLOW
CREAT SERPL-MCNC: 0.81 MG/DL (ref 0.57–1)
EOSINOPHIL # BLD AUTO: 0.1 X10E3/UL (ref 0–0.4)
EOSINOPHIL NFR BLD AUTO: 1 %
ERYTHROCYTE [DISTWIDTH] IN BLOOD BY AUTOMATED COUNT: 13.2 % (ref 12.3–15.4)
EST. AVERAGE GLUCOSE BLD GHB EST-MCNC: 105 MG/DL
GLOBULIN SER CALC-MCNC: 2.9 G/DL (ref 1.5–4.5)
GLUCOSE SERPL-MCNC: 98 MG/DL (ref 65–99)
GLUCOSE UR QL: NEGATIVE
HBA1C MFR BLD: 5.3 % (ref 4.8–5.6)
HCT VFR BLD AUTO: 37.3 % (ref 34–46.6)
HDLC SERPL-MCNC: 38 MG/DL
HGB BLD-MCNC: 12.7 G/DL (ref 11.1–15.9)
HGB UR QL STRIP: NEGATIVE
IMM GRANULOCYTES # BLD AUTO: 0 X10E3/UL (ref 0–0.1)
IMM GRANULOCYTES NFR BLD AUTO: 0 %
INTERPRETATION, 910389: NORMAL
KETONES UR QL STRIP: NEGATIVE
LDLC SERPL CALC-MCNC: 73 MG/DL (ref 0–99)
LEUKOCYTE ESTERASE UR QL STRIP: NEGATIVE
LYMPHOCYTES # BLD AUTO: 2.9 X10E3/UL (ref 0.7–3.1)
LYMPHOCYTES NFR BLD AUTO: 36 %
MCH RBC QN AUTO: 30.3 PG (ref 26.6–33)
MCHC RBC AUTO-ENTMCNC: 34 G/DL (ref 31.5–35.7)
MCV RBC AUTO: 89 FL (ref 79–97)
MICRO URNS: NORMAL
MONOCYTES # BLD AUTO: 0.4 X10E3/UL (ref 0.1–0.9)
MONOCYTES NFR BLD AUTO: 5 %
NEUTROPHILS # BLD AUTO: 4.6 X10E3/UL (ref 1.4–7)
NEUTROPHILS NFR BLD AUTO: 58 %
NITRITE UR QL STRIP: NEGATIVE
PH UR STRIP: 7 [PH] (ref 5–7.5)
PLATELET # BLD AUTO: 279 X10E3/UL (ref 150–379)
POTASSIUM SERPL-SCNC: 3.7 MMOL/L (ref 3.5–5.2)
PROT SERPL-MCNC: 7.5 G/DL (ref 6–8.5)
PROT UR QL STRIP: NEGATIVE
RBC # BLD AUTO: 4.19 X10E6/UL (ref 3.77–5.28)
SODIUM SERPL-SCNC: 145 MMOL/L (ref 134–144)
SP GR UR: 1.01 (ref 1–1.03)
T4 FREE SERPL-MCNC: 1.27 NG/DL (ref 0.82–1.77)
TRIGL SERPL-MCNC: 193 MG/DL (ref 0–149)
TSH SERPL DL<=0.005 MIU/L-ACNC: 0.76 UIU/ML (ref 0.45–4.5)
UROBILINOGEN UR STRIP-MCNC: 0.2 MG/DL (ref 0.2–1)
VIT B12 SERPL-MCNC: 543 PG/ML (ref 232–1245)
VLDLC SERPL CALC-MCNC: 39 MG/DL (ref 5–40)
WBC # BLD AUTO: 8 X10E3/UL (ref 3.4–10.8)

## 2019-04-11 ENCOUNTER — OFFICE VISIT (OUTPATIENT)
Dept: FAMILY MEDICINE CLINIC | Age: 39
End: 2019-04-11

## 2019-04-11 VITALS
HEART RATE: 78 BPM | BODY MASS INDEX: 34.66 KG/M2 | OXYGEN SATURATION: 99 % | DIASTOLIC BLOOD PRESSURE: 68 MMHG | HEIGHT: 64 IN | TEMPERATURE: 98 F | SYSTOLIC BLOOD PRESSURE: 112 MMHG | RESPIRATION RATE: 18 BRPM | WEIGHT: 203 LBS

## 2019-04-11 DIAGNOSIS — G89.29 CHRONIC RIGHT-SIDED LOW BACK PAIN WITHOUT SCIATICA: Primary | ICD-10-CM

## 2019-04-11 DIAGNOSIS — M79.642 BILATERAL HAND PAIN: ICD-10-CM

## 2019-04-11 DIAGNOSIS — E66.01 SEVERE OBESITY (HCC): ICD-10-CM

## 2019-04-11 DIAGNOSIS — M25.531 RIGHT WRIST PAIN: ICD-10-CM

## 2019-04-11 DIAGNOSIS — M79.641 BILATERAL HAND PAIN: ICD-10-CM

## 2019-04-11 DIAGNOSIS — M25.551 RIGHT HIP PAIN: ICD-10-CM

## 2019-04-11 DIAGNOSIS — M54.50 CHRONIC RIGHT-SIDED LOW BACK PAIN WITHOUT SCIATICA: Primary | ICD-10-CM

## 2019-04-11 DIAGNOSIS — F32.81 PMDD (PREMENSTRUAL DYSPHORIC DISORDER): ICD-10-CM

## 2019-04-11 RX ORDER — BISMUTH SUBSALICYLATE 262 MG
TABLET,CHEWABLE ORAL
COMMUNITY
End: 2020-07-24

## 2019-04-11 NOTE — PROGRESS NOTES
[address] [place], [address] 
[phone] Date of visit: 4/11/2019 Subjective:  
  
History obtained from:  the patient. Annia Melgar is a 45 y.o. female who presents today for follow up chronic problems Stomach doing much better so never took the ppi or got the hida scan Has even been able to tolerate fatty foods again For a while was doing better with diet/exercise, has fallen off the wagon For a while was doing treadmill, walking (knee hurts if she runs) Was eating out less Trying to have just a small snack when she gets home around 8:30pm instead of a whole meal 
Has a lunch at work but not a dinner by the time she gets off is really hungry, has been sometimes stopping for fast food When she does the treadmill at first will feel left shoulder pain and pain down arm Better when she holds her arm still next to her Also better if she has been doing the treadmill for longer. Mental state and mood are up and down, not good around that time of the month Can tell it is related to hormones, about 1 week before she feels bad Some months worse than others Talked to  about it Every time she has started an SSRI has gained weight She and  still not sure if they want to have another child Not trying to prevent it for past 2 months Says another reason she kept appt is that she has developed right sided hip pain Worse in am or if sitting for long, like even 20 minutes This started 2-3 months Better with walking. Pain comes from low back and over ileac crest 
Not getting better or worse Still feels an assymetry on right side of body Woke up recenlty with stiffness/soreness in hands, knuckles Took about 15 min to go away Sunday woke up with wrist pain Still hurting, a little better In the past lexapro and zoloft plus wellbutrin worked but both made her gain weight Patient Active Problem List  
 Diagnosis Date Noted  PMDD (premenstrual dysphoric disorder) 04/11/2019  Severe obesity ([address] 01/12/2019  Burning pain 01/12/2019  Eczema 01/12/2019  Nausea 01/11/2019  
 History of OCD (obsessive compulsive disorder) 01/11/2019  RUQ pain 01/11/2019  
 History of panic attacks 01/11/2019  
 PROM (premature rupture of membranes) 10/06/2017  Palpitations 06/01/2017  Shortness of breath 06/01/2017  Dizziness 06/01/2017 Current Outpatient Medications Medication Sig Dispense Refill  multivitamin (ONE A DAY) tablet multivitamin Allergies Allergen Reactions  Macrobid [Nitrofurantoin Monohyd/M-Cryst] Other (comments) Throat closed up  Pristiq [Desvenlafaxine Succinate] Other (comments) Made her more depressed Past Medical History:  
Diagnosis Date  Asthma   
 with illness; last needed inhaler > 1 year ago  Generalized anxiety disorder   
 last took medication 3 years ago  History of palpitations in adulthood 2015  
 saw Dr. Alberto Collier 6/2017, echo normal, sinus tach on holter monitor , murmur noted on exam  
 OCD (obsessive compulsive disorder)  Panic disorder  Postpartum depression History reviewed. No pertinent surgical history. Family History Problem Relation Age of Onset  Diabetes Mother  High Cholesterol Mother  Diabetes Father  Cancer Father   
     of the throat  Heart Disease Maternal Grandfather  Parkinson's Disease Paternal Grandmother  Hypertension Paternal Grandmother  Liver Disease Paternal Grandfather Primary sclerosis cholangitis Social History Tobacco Use  Smoking status: Former Smoker  Smokeless tobacco: Never Used  Tobacco comment: Quit 16 years ago smoked for 5 years Substance Use Topics  Alcohol use: No  
  
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  
 
Vitals:  
 04/11/19 1057 BP: 112/68 Pulse: 78 Resp: 18 Temp: 98 °F (36.7 °C) TempSrc: Oral  
SpO2: 99% Weight: 203 lb (92.1 kg) Height: 5' 4\" (1.626 m) Body mass index is 34.84 kg/m². General: stated age, obese and in NAD Neck: supple, symmetrical, trachea midline, no adenopathy and thyroid: not enlarged, symmetric, no tenderness/mass/nodules Lungs:  clear to auscultation w/o rales, rhonchi, wheezes w/normal effort and no use of accessory muscles of respiration Heart: regular rate and rhythm, S1, S2 normal, no murmur, click, rub or gallop Ext:  No edema noted. Lymph: no cervical adenopathy appreciated Skin:  Normal. and no rash or abnormalities Psych: alert and oriented to person, place, time and situation and Speech: appropriate quality, quantity and organization of sentences and normal affect: MSK: mildly tender right posterior hip/lower back, normal ROM without pains from movement Both legs measure 33 inches from ASIS to medial malleolus Right wrist without swelling. Has mild tenderness ulnar side and some pain with resisted ulnar deviation. Hands and fingers without swelling or tenderness Assessment/Plan: ICD-10-CM ICD-9-CM 1. Chronic right-sided low back pain without sciatica M54.5 724.2 XR SPINE LUMB 2 OR 3 V  
 G89.29 338.29   
2. Right hip pain M25.551 719.45 XR HIP RT W OR WO PELV 2-3 VWS 3. PMDD (premenstrual dysphoric disorder) F32.81 625.4 4. Severe obesity ([address] E66.01 278.01   
5. Right wrist pain M25.531 719.43   
6. Bilateral hand pain M79.641 729.5   
 M79.642 Orders Placed This Encounter  XR SPINE LUMB 2 OR 3 V  
 XR HIP RT W OR WO PELV 2-3 VWS  
 multivitamin (ONE A DAY) tablet Will do xrays back and hip when she starts her period to verify she is not pregnant Continue PNV as she is not preventing pregnancy PMDD quite a problem, would prefer natural solution, see plans below Trying to get back on track with diet, exercise, should help a lot of her symptoms I would think Wrist/hand symptoms just intermittent and not too concerning. Needs to give it time and see what it does, keep me updated Patient Instructions For the PMS: 
Try the blueberries and almonds daily during that time of the month Also less sugar and caffeine, more protein and veggies, less carbs Daily exercise If that doesn't work, prozac would probably be a good thing to try 
wellbutrin would also not cause weight gain but may not be quite as effective Discussed the diagnosis and plan and she expressed understanding. Follow-up and Dispositions · Return in about 3 months (around 7/11/2019).  
  
 
 
Xiao Altman MD

## 2019-04-11 NOTE — PATIENT INSTRUCTIONS
For the PMS: 
Try the blueberries and almonds daily during that time of the month Also less sugar and caffeine, more protein and veggies, less carbs Daily exercise If that doesn't work, prozac would probably be a good thing to try 
wellbutrin would also not cause weight gain but may not be quite as effective Back Stretches: Exercises Your Care Instructions Here are some examples of exercises for stretching your back. Start each exercise slowly. Ease off the exercise if you start to have pain. Your doctor or physical therapist will tell you when you can start these exercises and which ones will work best for you. How to do the exercises Overhead stretch 1. Stand comfortably with your feet shoulder-width apart. 2. Looking straight ahead, raise both arms over your head and reach toward the ceiling. Do not allow your head to tilt back. 3. Hold for 15 to 30 seconds, then lower your arms to your sides. 4. Repeat 2 to 4 times. Side stretch 1. Stand comfortably with your feet shoulder-width apart. 2. Raise one arm over your head, and then lean to the other side. 3. Slide your hand down your leg as you let the weight of your arm gently stretch your side muscles. Hold for 15 to 30 seconds. 4. Repeat 2 to 4 times on each side. Press-up 1. Lie on your stomach, supporting your body with your forearms. 2. Press your elbows down into the floor to raise your upper back. As you do this, relax your stomach muscles and allow your back to arch without using your back muscles. As your press up, do not let your hips or pelvis come off the floor. 3. Hold for 15 to 30 seconds, then relax. 4. Repeat 2 to 4 times. Relax and rest 
 
1. Lie on your back with a rolled towel under your neck and a pillow under your knees. Extend your arms comfortably to your sides. 2. Relax and breathe normally. 3. Remain in this position for about 10 minutes. 4. If you can, do this 2 or 3 times each day. Follow-up care is a key part of your treatment and safety. Be sure to make and go to all appointments, and call your doctor if you are having problems. It's also a good idea to know your test results and keep a list of the medicines you take. Where can you learn more? Go to http://maria alejandra-justin.info/. Enter S509 in the search box to learn more about \"Back Stretches: Exercises. \" Current as of: September 20, 2018 Content Version: 11.9 © 7428-6194 [employer]. Care instructions adapted under license by [employer] (which disclaims liability or warranty for this information). If you have questions about a medical condition or this instruction, always ask your healthcare professional. [address] any warranty or liability for your use of this information.

## 2019-04-11 NOTE — PROGRESS NOTES
Chief Complaint Patient presents with  Weight Management  
  follow up  Hip Pain  
  right for raisa. 2 months 1. Have you been to the ER, urgent care clinic since your last visit? Hospitalized since your last visit? No 
 
2. Have you seen or consulted any other health care providers outside of the [address] [place] [address] since your last visit? Include any pap smears or colon screening.  No

## 2019-05-14 LAB
CHLAMYDIA, EXTERNAL: NEGATIVE
HBSAG, EXTERNAL: NEGATIVE
HIV, EXTERNAL: NON REACTIVE
N. GONORRHEA, EXTERNAL: NEGATIVE
RUBELLA, EXTERNAL: NORMAL
T. PALLIDUM, EXTERNAL: NEGATIVE

## 2019-05-19 LAB — PAP SMEAR, EXTERNAL: NEGATIVE

## 2019-10-04 LAB
ANTIBODY SCREEN, EXTERNAL: NEGATIVE
GTT, 1 HR, GLUCOLA, EXTERNAL: 124

## 2019-12-05 LAB — GRBS, EXTERNAL: NEGATIVE

## 2019-12-16 ENCOUNTER — HOSPITAL ENCOUNTER (EMERGENCY)
Age: 39
Discharge: HOME | End: 2019-12-16
Attending: OBSTETRICS & GYNECOLOGY | Admitting: OBSTETRICS & GYNECOLOGY
Payer: COMMERCIAL

## 2019-12-16 VITALS
TEMPERATURE: 98.2 F | BODY MASS INDEX: 40.63 KG/M2 | SYSTOLIC BLOOD PRESSURE: 114 MMHG | RESPIRATION RATE: 16 BRPM | HEART RATE: 95 BPM | WEIGHT: 238 LBS | DIASTOLIC BLOOD PRESSURE: 76 MMHG | HEIGHT: 64 IN

## 2019-12-16 PROBLEM — O32.0XX0 UNSTABLE LIE OF FETUS: Status: ACTIVE | Noted: 2019-12-16

## 2019-12-16 PROBLEM — O42.90 PROM (PREMATURE RUPTURE OF MEMBRANES): Status: RESOLVED | Noted: 2017-10-06 | Resolved: 2019-12-16

## 2019-12-16 PROBLEM — N89.8 VAGINAL DISCHARGE DURING PREGNANCY IN THIRD TRIMESTER: Status: ACTIVE | Noted: 2019-12-16

## 2019-12-16 PROBLEM — R10.11 RUQ PAIN: Status: RESOLVED | Noted: 2019-01-11 | Resolved: 2019-12-16

## 2019-12-16 PROBLEM — R52 BURNING PAIN: Status: RESOLVED | Noted: 2019-01-12 | Resolved: 2019-12-16

## 2019-12-16 PROBLEM — R11.0 NAUSEA: Status: RESOLVED | Noted: 2019-01-11 | Resolved: 2019-12-16

## 2019-12-16 PROBLEM — O26.843 UTERINE SIZE DATE DISCREPANCY PREGNANCY, THIRD TRIMESTER: Status: ACTIVE | Noted: 2019-12-16

## 2019-12-16 PROBLEM — Z3A.38 38 WEEKS GESTATION OF PREGNANCY: Status: ACTIVE | Noted: 2019-12-16

## 2019-12-16 PROBLEM — R06.02 SHORTNESS OF BREATH: Status: RESOLVED | Noted: 2017-06-01 | Resolved: 2019-12-16

## 2019-12-16 PROBLEM — R00.2 PALPITATIONS: Status: RESOLVED | Noted: 2017-06-01 | Resolved: 2019-12-16

## 2019-12-16 PROBLEM — O26.893 VAGINAL DISCHARGE DURING PREGNANCY IN THIRD TRIMESTER: Status: ACTIVE | Noted: 2019-12-16

## 2019-12-16 PROBLEM — R42 DIZZINESS: Status: RESOLVED | Noted: 2017-06-01 | Resolved: 2019-12-16

## 2019-12-16 LAB
A1 MICROGLOB PLACENTAL VAG QL: NEGATIVE
CONTROL LINE PRESENT?: NORMAL
EXPIRATION DATE: NORMAL
INTERNAL NEGATIVE CONTROL: NORMAL
KIT LOT NO.: NORMAL

## 2019-12-16 PROCEDURE — 76815 OB US LIMITED FETUS(S): CPT

## 2019-12-16 PROCEDURE — 84112 EVAL AMNIOTIC FLUID PROTEIN: CPT | Performed by: MIDWIFE

## 2019-12-16 PROCEDURE — 99285 EMERGENCY DEPT VISIT HI MDM: CPT

## 2019-12-16 RX ORDER — NYSTATIN 100000 [USP'U]/G
POWDER TOPICAL 4 TIMES DAILY
COMMUNITY
End: 2020-07-24

## 2019-12-17 PROBLEM — O09.523 ADVANCED MATERNAL AGE IN MULTIGRAVIDA, THIRD TRIMESTER: Status: ACTIVE | Noted: 2019-12-17

## 2019-12-17 PROCEDURE — 99285 EMERGENCY DEPT VISIT HI MDM: CPT

## 2019-12-17 PROCEDURE — 76815 OB US LIMITED FETUS(S): CPT

## 2019-12-17 NOTE — DISCHARGE INSTRUCTIONS
Patient Education        Week 45 of Your Pregnancy: Care Instructions  Your Care Instructions    Believe it or not, your baby is almost here. You may have ideas about your baby's personality because of how much he or she moves. Or you may have noticed how he or she responds to sounds, warmth, cold, and light. You may even know what kind of music your baby likes. By now, you have a better idea of what to expect during delivery. You may have talked about your birth preferences with your doctor. But even if you want a vaginal birth, it is a good idea to learn about  births.  birth means that your baby is born through a cut (incision) in your lower belly. It is sometimes the best choice for the health of the baby and the mother. This care sheet can help you understand  births. It also gives you information about what to expect after your baby is born. And it helps you understand more about postpartum depression. Follow-up care is a key part of your treatment and safety. Be sure to make and go to all appointments, and call your doctor if you are having problems. It's also a good idea to know your test results and keep a list of the medicines you take. How can you care for yourself at home? Learn about  birth  · Most C-sections are unplanned. They are done because of problems that occur during labor. These problems might include:  ? Labor that slows or stops. ? High blood pressure or other problems for the mother. ? Signs of distress in the baby. These signs may include a very fast or slow heart rate. · Although most mothers and babies do well after , it is major surgery. It has more risks than a vaginal delivery. · In some cases, a planned  may be safer than a vaginal delivery. This may be the case if:  ? The mother has a health problem, such as a heart condition. ? The baby isn't in a head-down position for delivery. This is called a breech position. ?  The uterus has scars from past surgeries. This could increase the chance of a tear in the uterus. ? There is a problem with the placenta. ? The mother has an infection, such as genital herpes, that could be spread to the baby. ? The mother is having twins or more. ? The baby weighs 9 to 10 pounds or more. · Because of the risks of , planned C-sections generally should be done only for medical reasons. And a planned  should be done at 39 weeks or later unless there is a medical reason to do it sooner. Know what to expect after delivery, and plan for the first few weeks at home  · You, your baby, and your partner or  will get identification bands. Only people with matching bands can  the baby from the nursery. · You will learn how to feed, diaper, and bathe your baby. And you will learn how to care for the umbilical cord stump. If your baby will be circumcised, you will also learn how to care for that. · Ask people to wait to visit you until you are at home. And ask them to wash their hands before they touch your baby. · Make sure you have another adult in your home for at least 2 or 3 days after the birth. · During the first 2 weeks, limit when friends and family can visit. · Do not allow visitors who have colds or infections. Make sure all visitors are up to date with their vaccinations. Never let anyone smoke around your baby. · Try to nap when the baby naps. Be aware of postpartum depression  · \"Baby blues\" are common for the first 1 to 2 weeks after birth. You may cry or feel sad or irritable for no reason. · For some women, these feelings last longer and are more intense. This is called postpartum depression. · If your symptoms last for more than a few weeks or you feel very depressed, ask your doctor for help. · Postpartum depression can be treated. Support groups and counseling can help. Sometimes medicine can also help. Where can you learn more?   Go to http://maria alejandra-justin.info/. Enter B044 in the search box to learn more about \"Week 38 of Your Pregnancy: Care Instructions. \"  Current as of: May 29, 2019  Content Version: 12.2  © 8007-0781 [employer]. Care instructions adapted under license by [employer] (which disclaims liability or warranty for this information). If you have questions about a medical condition or this instruction, always ask your healthcare professional. [address] any warranty or liability for your use of this information.

## 2019-12-17 NOTE — PROGRESS NOTES
2237 - Patient arrived c/o leaking of fluid at 2045. Patient denies any contractions or vaginal bleeding and reports active fetal movement. 2240 - Nitrazine equivocal.     [id number] - RN at bedside continuing to adjust FHR monitor. Audible fetal movement and fetal heart rate in 150s. 2249 - Amnisure performed and resulted negative. 2330 - R. Sunday Custer at bedside. Speculum exam performed and no fluid visualized. 2335 - Ultsound performed by Sebastián Mills CNM and vertex presentation at this time. Discharge orders received. Patient to follow-up with Dr. Arely Robles as scheduled on Wednesday. 2350 - Patient discharged home and verbalized understanding of discharge instructions and labor precautions.

## 2019-12-17 NOTE — H&P
History & Physical    Subjective:     Karishma Pickard is a 44 y.o. female [id number] SIUP @ 38w5d by Estimated Date of Delivery: 19 who arrives to L&D with chief complaint of gush of fluid @ . Last known fetal position was breech in office. Endorses +FM, unknown status of membranes; Pt denies vaginal bleeding, cramping, fever/chills, chest pain, SOB, HA, scotomata, sudden swelling, nor malaise. Pregnancy problems include:   - Body mass index is 40.85 kg/m². -   Patient Active Problem List   Diagnosis Code    History of OCD (obsessive compulsive disorder) Z86.59    History of panic attacks Z86.59    Severe obesity (HCC) E66.01    Eczema L30.9    PMDD (premenstrual dysphoric disorder) F32.81    38 weeks gestation of pregnancy Z3A.38    Vaginal discharge during pregnancy in third trimester O26.893, N89.8    Uterine size date discrepancy pregnancy, third trimester O26.843    Unstable lie of fetus O32. [de-identified] Advanced maternal age in multigravida, third trimester O09.523       Allergies  -   Allergies   Allergen Reactions    Macrobid [Nitrofurantoin Monohyd/M-Cryst] Other (comments)     Throat closed up    Pristiq [Desvenlafaxine Succinate] Other (comments)     Made her more depressed       Prenatal Labs  Lab Results   Component Value Date/Time    Rubella, External Immune 2019    GrBStrep, External Negative 2019    HBsAg, External Negative 2019    HIV, External Non reactive 2019    T.  Pallidum Antibody, External Negative 2019    Gonorrhea, External Negative 2019    Chlamydia, External Negative 2019     A Positive    OB History  OB History        4    Para   2    Term   2            AB   1    Living   2       SAB        TAB   1    Ectopic        Molar        Multiple   0    Live Births   2                 PMHx  Past Medical History:   Diagnosis Date    Asthma     with illness; last needed inhaler > 1 year ago    Generalized anxiety disorder     last took medication 3 years ago    History of palpitations in adulthood 2015    saw Dr. Maria E Ramirez 6/2017, echo normal, sinus tach on holter monitor , murmur noted on exam    OCD (obsessive compulsive disorder)     Panic disorder     Postpartum depression     with 1st pregnancy        PSHx  History reviewed. No pertinent surgical history. F/SHx  Family History   Problem Relation Age of Onset    Diabetes Mother     High Cholesterol Mother     Diabetes Father     Cancer Father         of the throat    Heart Disease Maternal Grandfather     Parkinson's Disease Paternal Grandmother     Hypertension Paternal Grandmother     Liver Disease Paternal Grandfather         Primary sclerosis cholangitis      Social History     Socioeconomic History    Marital status:      Spouse name: Not on file    Number of children: Not on file    Years of education: Not on file    Highest education level: Not on file   Occupational History    Not on file   Social Needs    Financial resource strain: Not on file    Food insecurity:     Worry: Not on file     Inability: Not on file    Transportation needs:     Medical: Not on file     Non-medical: Not on file   Tobacco Use    Smoking status: Former Smoker    Smokeless tobacco: Never Used    Tobacco comment: Quit 19 years ago smoked for 5 years   Substance and Sexual Activity    Alcohol use: No    Drug use: No    Sexual activity: Yes     Partners: Male     Birth control/protection: None       Home Medications:  Prior to Admission Medications   Prescriptions Last Dose Informant Patient Reported? Taking?   multivitamin (ONE A DAY) tablet 12/15/2019 at 2200  Yes Yes   Sig: multivitamin   nystatin (MYCOSTATIN) powder 12/15/2019 at 2200  Yes Yes   Sig: Apply  to affected area four (4) times daily.       Facility-Administered Medications: None        Review of Systems:  A comprehensive review of systems was negative except for that written in the History of Present Illness. Objective:     Vitals:    12/16/19 2252 12/16/19 2259 12/16/19 2313   BP: 130/74  114/76   Pulse: 95  95   Resp: 16     Temp: 98.2 °F (36.8 °C)     Weight:  108 kg (238 lb)    Height:  5' 4\" (1.626 m)       Body mass index is 40.85 kg/m². Physical Exam:  General:  Alert, cooperative, no distress, appears stated age. Lungs:   Clear to auscultation bilaterally. Symmetrical expansion, non-labored   Heart:  Regular rate and rhythm   Abdomen:   Soft, non-tender. Gravid. Extremities: Extremities normal, atraumatic, no cyanosis or edema. Skin: Skin color, texture, turgor normal. No rashes or lesions   /Cervical Exam: no lesions, speculum exam negative for pooling; creamy white vaginal discharge; Pt declines cervical exam  Fetal Size: S>D, palpate large amount of amniotic fluid, Leopold's vertex    Bedside Ultrasound confirms Vertex presentation       Electronic Fetal Monitoring    Fetal Heart Rate:      Patient Vitals for the past 4 hrs: Mode Fetal Heart Rate Fetal Activity Variability Decelerations Accelerations FHR Interpretation Provider who reviewed strip? Non Stress Test   12/16/19 2310 External 150 Present 6-25 BPM None Yes Category I Cyndi Galla, CNM Reactive   12/16/19 2245 External 145 Present -- -- -- -- -- --     Uterine Contractions: occasional, mild, relaxed to palpation  Labs: Nitrazine and amni sure both negative, No pooling    Assessment:   44 y.o. female [id number] SIUP @  38w5 d by ABBEY of Estimated Date of Delivery: 12/25/19  A Positive   Unstable fetal lie    Lab Results   Component Value Date/Time    Rubella, External Immune 05/14/2019    GrBStrep, External Negative 12/05/2019    HBsAg, External Negative 05/14/2019    HIV, External Non reactive 05/14/2019    T.  Pallidum Antibody, External Negative 05/14/2019    Gonorrhea, External Negative 05/14/2019    Chlamydia, External Negative 05/14/2019       Pregnancy problems include:   -   Patient Active Problem List   Diagnosis Code  History of OCD (obsessive compulsive disorder) Z86.59    History of panic attacks Z86.59    Severe obesity (HCC) E66.01    Eczema L30.9    PMDD (premenstrual dysphoric disorder) F32.81    38 weeks gestation of pregnancy Z3A.38    Vaginal discharge during pregnancy in third trimester O26.893, N89.8    Uterine size date discrepancy pregnancy, third trimester O26.843    Unstable lie of fetus O32. [de-identified]    Advanced maternal age in multigravida, third trimester O09.523     - Body mass index is 40.85 kg/m². EFM:   - Category 1  - NST: Fetal NST Findings: reactive    Plan:   Admit to L&D for Outpatient for leaking of fluid    Tests/Labs/Monitoring ordered: Electronic Fetal Monitoring NST, SVE, Amnisure and Nitrazine   Bedside ultrasound for fetal presentation  GBS negative    Review of prenatal records    Patient Education:   - Dx of unstable lie   - 3rd trimester topics:  signs of labor- when to go to the hospital & follow up with OB    CNM management    Postpartum Plan:  Vaccinations (maternal) needed: none  Infant Feeding plans: Breastfeeding  Infant sex: FEMALE    Patient has been counseled regarding this plan of care and is in agreement; all questions answered. Pt Discharged home under self care in stable condition.   Collaborative MD: Olu      Signed By:  Regina Stockton CNM      12/17/2019 11:42 PM

## 2019-12-19 ENCOUNTER — HOSPITAL ENCOUNTER (INPATIENT)
Age: 39
LOS: 4 days | Discharge: HOME | End: 2019-12-23
Attending: OBSTETRICS & GYNECOLOGY | Admitting: OBSTETRICS & GYNECOLOGY
Payer: COMMERCIAL

## 2019-12-19 ENCOUNTER — ANESTHESIA (OUTPATIENT)
Dept: LABOR AND DELIVERY | Age: 39
End: 2019-12-19
Payer: COMMERCIAL

## 2019-12-19 ENCOUNTER — ANESTHESIA EVENT (OUTPATIENT)
Dept: LABOR AND DELIVERY | Age: 39
End: 2019-12-19
Payer: COMMERCIAL

## 2019-12-19 DIAGNOSIS — G89.18 POSTOPERATIVE PAIN: Primary | ICD-10-CM

## 2019-12-19 PROBLEM — O42.90 PREMATURE RUPTURE OF MEMBRANES: Status: ACTIVE | Noted: 2019-12-19

## 2019-12-19 LAB
ERYTHROCYTE [DISTWIDTH] IN BLOOD BY AUTOMATED COUNT: 14.6 % (ref 11.5–14.5)
HCT VFR BLD AUTO: 39.7 % (ref 35–47)
HGB BLD-MCNC: 12.7 G/DL (ref 11.5–16)
MCH RBC QN AUTO: 30.3 PG (ref 26–34)
MCHC RBC AUTO-ENTMCNC: 32 G/DL (ref 30–36.5)
MCV RBC AUTO: 94.7 FL (ref 80–99)
NRBC # BLD: 0 K/UL (ref 0–0.01)
NRBC BLD-RTO: 0 PER 100 WBC
PLATELET # BLD AUTO: 197 K/UL (ref 150–400)
PMV BLD AUTO: 10.8 FL (ref 8.9–12.9)
RBC # BLD AUTO: 4.19 M/UL (ref 3.8–5.2)
WBC # BLD AUTO: 11.4 K/UL (ref 3.6–11)

## 2019-12-19 PROCEDURE — 74011250637 HC RX REV CODE- 250/637: Performed by: OBSTETRICS & GYNECOLOGY

## 2019-12-19 PROCEDURE — A4300 CATH IMPL VASC ACCESS PORTAL: HCPCS

## 2019-12-19 PROCEDURE — 85027 COMPLETE CBC AUTOMATED: CPT

## 2019-12-19 PROCEDURE — 74011000250 HC RX REV CODE- 250

## 2019-12-19 PROCEDURE — 10H07YZ INSERTION OF OTHER DEVICE INTO PRODUCTS OF CONCEPTION, VIA NATURAL OR ARTIFICIAL OPENING: ICD-10-PCS | Performed by: OBSTETRICS & GYNECOLOGY

## 2019-12-19 PROCEDURE — 36415 COLL VENOUS BLD VENIPUNCTURE: CPT

## 2019-12-19 PROCEDURE — 74011000250 HC RX REV CODE- 250: Performed by: ANESTHESIOLOGY

## 2019-12-19 PROCEDURE — 74011250636 HC RX REV CODE- 250/636: Performed by: OBSTETRICS & GYNECOLOGY

## 2019-12-19 PROCEDURE — 75410000002 HC LABOR FEE PER 1 HR

## 2019-12-19 PROCEDURE — 74011250636 HC RX REV CODE- 250/636: Performed by: ANESTHESIOLOGY

## 2019-12-19 PROCEDURE — 76060000078 HC EPIDURAL ANESTHESIA

## 2019-12-19 PROCEDURE — 65270000029 HC RM PRIVATE

## 2019-12-19 RX ORDER — OXYTOCIN/0.9 % SODIUM CHLORIDE 30/500 ML
0-25 PLASTIC BAG, INJECTION (ML) INTRAVENOUS
Status: DISCONTINUED | OUTPATIENT
Start: 2019-12-19 | End: 2019-12-20 | Stop reason: HOSPADM

## 2019-12-19 RX ORDER — FENTANYL/BUPIVACAINE/NS/PF 2-1250MCG
PREFILLED PUMP RESERVOIR EPIDURAL
Status: COMPLETED
Start: 2019-12-19 | End: 2019-12-19

## 2019-12-19 RX ORDER — EPHEDRINE SULFATE/0.9% NACL/PF 50 MG/5 ML
SYRINGE (ML) INTRAVENOUS
Status: COMPLETED
Start: 2019-12-19 | End: 2019-12-19

## 2019-12-19 RX ORDER — BUPIVACAINE HYDROCHLORIDE 5 MG/ML
INJECTION, SOLUTION EPIDURAL; INTRACAUDAL
Status: COMPLETED
Start: 2019-12-19 | End: 2019-12-19

## 2019-12-19 RX ORDER — FENTANYL CITRATE 50 UG/ML
100 INJECTION, SOLUTION INTRAMUSCULAR; INTRAVENOUS
Status: DISCONTINUED | OUTPATIENT
Start: 2019-12-19 | End: 2019-12-20 | Stop reason: HOSPADM

## 2019-12-19 RX ORDER — SODIUM CHLORIDE, SODIUM LACTATE, POTASSIUM CHLORIDE, CALCIUM CHLORIDE 600; 310; 30; 20 MG/100ML; MG/100ML; MG/100ML; MG/100ML
125 INJECTION, SOLUTION INTRAVENOUS CONTINUOUS
Status: DISCONTINUED | OUTPATIENT
Start: 2019-12-19 | End: 2019-12-23 | Stop reason: HOSPADM

## 2019-12-19 RX ORDER — BUPIVACAINE HYDROCHLORIDE 5 MG/ML
30 INJECTION, SOLUTION EPIDURAL; INTRACAUDAL AS NEEDED
Status: DISCONTINUED | OUTPATIENT
Start: 2019-12-19 | End: 2019-12-20 | Stop reason: HOSPADM

## 2019-12-19 RX ORDER — SODIUM CHLORIDE 0.9 % (FLUSH) 0.9 %
5-40 SYRINGE (ML) INJECTION EVERY 8 HOURS
Status: DISCONTINUED | OUTPATIENT
Start: 2019-12-19 | End: 2019-12-20 | Stop reason: HOSPADM

## 2019-12-19 RX ORDER — EPHEDRINE SULFATE/0.9% NACL/PF 50 MG/5 ML
12.5 SYRINGE (ML) INTRAVENOUS
Status: COMPLETED | OUTPATIENT
Start: 2019-12-19 | End: 2019-12-19

## 2019-12-19 RX ORDER — LIDOCAINE HYDROCHLORIDE AND EPINEPHRINE 15; 5 MG/ML; UG/ML
INJECTION, SOLUTION EPIDURAL AS NEEDED
Status: DISCONTINUED | OUTPATIENT
Start: 2019-12-19 | End: 2019-12-20 | Stop reason: HOSPADM

## 2019-12-19 RX ORDER — NYSTATIN 100000 [USP'U]/G
POWDER TOPICAL 2 TIMES DAILY
Status: DISCONTINUED | OUTPATIENT
Start: 2019-12-19 | End: 2019-12-23 | Stop reason: HOSPADM

## 2019-12-19 RX ORDER — BUPIVACAINE HYDROCHLORIDE 2.5 MG/ML
INJECTION, SOLUTION EPIDURAL; INFILTRATION; INTRACAUDAL AS NEEDED
Status: DISCONTINUED | OUTPATIENT
Start: 2019-12-19 | End: 2019-12-20 | Stop reason: HOSPADM

## 2019-12-19 RX ORDER — NALBUPHINE HYDROCHLORIDE 10 MG/ML
2.5 INJECTION, SOLUTION INTRAMUSCULAR; INTRAVENOUS; SUBCUTANEOUS
Status: ACTIVE | OUTPATIENT
Start: 2019-12-19 | End: 2019-12-19

## 2019-12-19 RX ORDER — NALBUPHINE HYDROCHLORIDE 10 MG/ML
10 INJECTION, SOLUTION INTRAMUSCULAR; INTRAVENOUS; SUBCUTANEOUS
Status: DISCONTINUED | OUTPATIENT
Start: 2019-12-19 | End: 2019-12-20 | Stop reason: HOSPADM

## 2019-12-19 RX ORDER — FENTANYL/BUPIVACAINE/NS/PF 2-1250MCG
1-16 PREFILLED PUMP RESERVOIR EPIDURAL CONTINUOUS
Status: DISCONTINUED | OUTPATIENT
Start: 2019-12-19 | End: 2019-12-23 | Stop reason: HOSPADM

## 2019-12-19 RX ORDER — SODIUM CHLORIDE 0.9 % (FLUSH) 0.9 %
5-40 SYRINGE (ML) INJECTION AS NEEDED
Status: DISCONTINUED | OUTPATIENT
Start: 2019-12-19 | End: 2019-12-20 | Stop reason: HOSPADM

## 2019-12-19 RX ORDER — ONDANSETRON 2 MG/ML
4 INJECTION INTRAMUSCULAR; INTRAVENOUS
Status: DISCONTINUED | OUTPATIENT
Start: 2019-12-19 | End: 2019-12-20 | Stop reason: HOSPADM

## 2019-12-19 RX ORDER — NALOXONE HYDROCHLORIDE 0.4 MG/ML
0.4 INJECTION, SOLUTION INTRAMUSCULAR; INTRAVENOUS; SUBCUTANEOUS AS NEEDED
Status: DISCONTINUED | OUTPATIENT
Start: 2019-12-19 | End: 2019-12-20 | Stop reason: HOSPADM

## 2019-12-19 RX ORDER — FENTANYL CITRATE 50 UG/ML
INJECTION, SOLUTION INTRAMUSCULAR; INTRAVENOUS AS NEEDED
Status: DISCONTINUED | OUTPATIENT
Start: 2019-12-19 | End: 2019-12-20 | Stop reason: HOSPADM

## 2019-12-19 RX ORDER — FENTANYL CITRATE 50 UG/ML
100 INJECTION, SOLUTION INTRAMUSCULAR; INTRAVENOUS ONCE
Status: COMPLETED | OUTPATIENT
Start: 2019-12-19 | End: 2019-12-19

## 2019-12-19 RX ADMIN — FENTANYL CITRATE 100 MCG: 50 INJECTION, SOLUTION INTRAMUSCULAR; INTRAVENOUS at 22:12

## 2019-12-19 RX ADMIN — Medication 10 ML/HR: at 22:21

## 2019-12-19 RX ADMIN — SODIUM CHLORIDE, SODIUM LACTATE, POTASSIUM CHLORIDE, AND CALCIUM CHLORIDE 1000 ML: 600; 310; 30; 20 INJECTION, SOLUTION INTRAVENOUS at 21:46

## 2019-12-19 RX ADMIN — NYSTATIN: 100000 POWDER TOPICAL at 18:39

## 2019-12-19 RX ADMIN — OXYTOCIN-SODIUM CHLORIDE 0.9% IV SOLN 30 UNIT/500ML 2 MILLI-UNITS/MIN: 30-0.9/5 SOLUTION at 11:28

## 2019-12-19 RX ADMIN — SODIUM CHLORIDE, POTASSIUM CHLORIDE, SODIUM LACTATE AND CALCIUM CHLORIDE 1000 ML: 600; 310; 30; 20 INJECTION, SOLUTION INTRAVENOUS at 23:00

## 2019-12-19 RX ADMIN — BUPIVACAINE HYDROCHLORIDE 2 ML: 2.5 INJECTION, SOLUTION EPIDURAL; INFILTRATION; INTRACAUDAL; PERINEURAL at 22:14

## 2019-12-19 RX ADMIN — LIDOCAINE HYDROCHLORIDE,EPINEPHRINE BITARTRATE 3 ML: 15; .005 INJECTION, SOLUTION EPIDURAL; INFILTRATION; INTRACAUDAL; PERINEURAL at 22:13

## 2019-12-19 RX ADMIN — BUPIVACAINE HYDROCHLORIDE 150 MG: 5 INJECTION, SOLUTION EPIDURAL; INTRACAUDAL at 22:00

## 2019-12-19 RX ADMIN — Medication 12.5 MG: at 22:49

## 2019-12-19 RX ADMIN — SODIUM CHLORIDE, SODIUM LACTATE, POTASSIUM CHLORIDE, AND CALCIUM CHLORIDE 125 ML/HR: 600; 310; 30; 20 INJECTION, SOLUTION INTRAVENOUS at 23:38

## 2019-12-19 RX ADMIN — BUPIVACAINE HYDROCHLORIDE 2 ML: 2.5 INJECTION, SOLUTION EPIDURAL; INFILTRATION; INTRACAUDAL; PERINEURAL at 22:15

## 2019-12-19 RX ADMIN — SODIUM CHLORIDE, SODIUM LACTATE, POTASSIUM CHLORIDE, AND CALCIUM CHLORIDE 125 ML/HR: 600; 310; 30; 20 INJECTION, SOLUTION INTRAVENOUS at 16:41

## 2019-12-19 RX ADMIN — LIDOCAINE HYDROCHLORIDE,EPINEPHRINE BITARTRATE 2 ML: 15; .005 INJECTION, SOLUTION EPIDURAL; INFILTRATION; INTRACAUDAL; PERINEURAL at 22:11

## 2019-12-19 RX ADMIN — SALINE NASAL SPRAY 2 SPRAY: 1.5 SOLUTION NASAL at 18:40

## 2019-12-19 RX ADMIN — SODIUM CHLORIDE, SODIUM LACTATE, POTASSIUM CHLORIDE, AND CALCIUM CHLORIDE 125 ML/HR: 600; 310; 30; 20 INJECTION, SOLUTION INTRAVENOUS at 11:25

## 2019-12-19 RX ADMIN — BUPIVACAINE HYDROCHLORIDE 150 MG: 5 INJECTION, SOLUTION EPIDURAL; INTRACAUDAL; PERINEURAL at 22:00

## 2019-12-19 RX ADMIN — FENTANYL CITRATE 100 MCG: 50 INJECTION, SOLUTION INTRAMUSCULAR; INTRAVENOUS at 23:00

## 2019-12-19 NOTE — PROGRESS NOTES
In room to see patient. Feeling some CTX. Feels like they are a little spaced out. Visit Vitals  /74 (BP 1 Location: Left arm, BP Patient Position: At rest)   Pulse 86   Temp 98.2 °F (36.8 °C)   Resp 16   Ht 5' 4\" (1.626 m)   Wt 108 kg (238 lb)   LMP 2019   BMI 40.85 kg/m²     SVE: 3-4/50/-3, IUPC placed, vertex palpable on exam.  EFM: Cat 1  [place]: q3-5 mins    A/P: Pt is a 43 yo  at 39+1 presenting with term PROM. Continue pitocin augmentation. IUPC placed -- variables noted earlier but now resolved. Continue to titrate pitocin. Epidural PRN. Anticipate .

## 2019-12-19 NOTE — H&P
[facility]  [address]   [place], [address] [facility]  Phone: [phone], Fax: [phone]  Date: 2019    Pregnancy Problems:   Breech presentation   Reduced fetal movement   Uterine size for dates discrepancy - S>D 30 weeks, growth--99%. Repeat 36 wks 97%, IOL 39wks__   Body mass index 30+ - obesity   Urinary tract infectious disease - Start: 2019 - Rx Amoxicillin, KEERTHI neg   Advanced maternal age  - declined testing   Multiparous - nrml u/s, declined testing GBS neg    PNR faxed to [facility] L&D 12/10   Primary  [id number] 10:00am 130[address] [facility], [facility] - [id number] at 9:00am  History of Present Illness:  Pt is a 43 yo  presenting with gross SROM this AM.    Pt reports occasional CTX, not regular. No VB.  +FM. Pt reports that she has had an unstable lie this pregnancy. Also pt was measuring 97% at 35+5 wga. Previous 8lb 15oz baby delivered vaginally. Assessment/Plan  1. Gestation period, 39 weeks -  Z3A.39: 39 weeks gestation of pregnancy    2. Premature rupture of membranes -  TERM PROM. Given hx unstable lie and long labors with TERM PROM in past recommend pitocin augmentation to help augment labor and hopefully keep baby in [place] presentation. GBS neg.  O42.90: Premature rupture of membranes, unspecified as to length of time between rupture and onset of labor, unspecified weeks of gestation    3. Uterine size for dates discrepancy -  Reviewed previous US at 36w 97%  EFW based on previous US 9.5 lbs  Pelvis proven to 9lbs in past.  Reviewed risk of shoulder dystocia. Reviewed low threshold for  delivery if labor not progressing and avoidance of operative delivery. Pt questions answered and she agrees with plan. O26.849: Uterine size-date discrepancy, unspecified trimester    4. Unstable lie -  VTX presentation on admission by [address]. 0XX9: Maternal care for unstable lie, other fetus      Return to Office   to see Melanie Brown MD at . Lisseth Livingston [address] on or around 12/18/2019  Indigo Henry MD for Surgery at [facility] () on 12/20/2019 at 10:00 AM   for Return OB at [address] on or around 12/25/2019  Prenatal Flowsheet:  Fundus                     Pres FHR                     FM PLS                     Cervix Exam BP Wt Edema                     Glucose                     Protein                     Leukocytes                     Nitrite                     Ketones                     Blood                       05/14/2019   7 wks 6 days   [id number]                         167                     No   112/68                     207 lbs none none neg       Comments: Rm 22 US today- urine cx nausea; fatigue. Declines meds for nausea. Ultrasound looks good. Not many questions. Did the Tetra last time. AMA with that pregnancy too. Mat 21 reviewed. Will check cost first. CF and SMA reviewed. Worried about having cervical cancer. Pap in 2017 was wnl, but will do one today. Prenatal labs sent.   06/11/2019   11 wks 6 days   btozer                         172                     No   120/76                     210 lbs none none neg       Comments: Feeling well overall, few headaches, +nausea, no vomiting, no bleeding/spotting. Oldest son, Claude Brooks. Aarti Nunn will be two in October. Decided against genetic testing. Doesn't think she could ever have a termination even if the baby had a lethal condition. Also, if this were an abnormal pregnancy she doesn't think she'd try to conceive again. urine KEERTHI today. Plans [facility] delivery. 07/09/2019   15 wks 6 days   btozer                         157                     Yes   110/62                     214 lbs none none neg       Comments: Feeling well, c/o headaches, started feeling some flutters. No concerns.    08/08/2019   20 wks 1 days                           151                     Yes   120/78                     218 lbs trace none neg       Comments: u/s   08/08/2019 20 wks 1 days   btozer                                        Comments: Girl!! Ultrasound today normal but needs to return in 2 weeks for additional views of the heart and spine. She's noticed increased palpitations but not as many as she had last pregnancy. She saw a cardiologist last time and we never treated her. Precautions reviewed. Overall, she feels a lot better this pregnancy than she did last time. 09/06/2019   24 wks 2 days   btozer                       27 cm  156                     Yes   114/68                     225 lbs 1+ 1+ neg       Comments: She's had 1-2 headaches. Once a week she'll have a menstrual like cramp. Encouraged the flu shot. Feels like she gets angry quickly more often than ever before, no depression. Recommend she consider counseling. 10/04/2019   28 wks 2 days   btozer                       29 cm Vertex 140                     Yes   104/58                     228 lbs 1+ none neg       Comments: Glucola, tdap, labs today. Recommend the flu shot, she'll consider. Some constipation. Has a breast pump. 10/18/2019   30 wks 2 days   btozer                       34 cm  142                     Yes   118/74                     231 lbs 1+ none neg       Comments: Feeling well. Baby is active. Kick counts reviewed. Feels like her baby has grown a lot in the last 2 weeks. S>D also, so will add growth to next visit. Watch weight gain. She was exposed to a patient at work with strep throat, but denies any symptoms herself. Flu shot today. 10/29/2019   31 wks 6 days   rkreis                        Breech                     142                     Yes   110/70                     232 lbs 1+ none neg       Comments: US-99%, breech, BPP 8/8. 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 RTC 2 wks.    11/04/2019   32 wks 5 days   btozer                        Breech                     145                     Decreased                       124/72 234 lbs 1+ none neg       Comments: Rm 19-- ob prob; decreased fetal movement for the last 4 days. Typically her baby has 5 days of being really active, then 2 days of being less active - this time she \"hasn't picked back up. \" She feels the type of movements are much more subtle. No LOF. She does feel like the baby is lower. Pt says she has had very few ashley joseph lately, whereas she was having frequent ashley joseph prior to a week ago. No LOF or bleeding. She has tried kick counts and does feel the baby moves 10x/2 hrs - the kicks just aren't as strong. Also complains of itching in her groin, no rash, nystatin powder recommended. 11/15/2019   34 wks 2 days   btozer                       37 cm  145                     Yes   118/76                     238 lbs 1+ none neg       Comments: Doing well, tired, achey. Fetal movement has been back to normal since the day after her last visit. u/s for growth and position next visit. 11/25/2019   35 wks 5 days                         40 cm  138                     Yes   124/80                     237 lbs 1+ none neg       Comments: US prior at [facility], ERF 97% 7 lb 10oz. Plan IOL 39 w (12/20 = 39w2d) Transverse lie - head maternal left -- but feels vertex by Leopold's. Will reassess next week with the cervical check. Doing well, pt says she has quit working, because she could not keep up - she's having headaches daily, heart palpitations, leg pain. Will back date FMLA to last Thursday when she stopped working 11/21. +headaches in morning and on and off throughout the day - she doesn't feel she needs to take anything for them, they just resolve on their own. Feeling more out of breath.    11/25/2019     btozer                                        12/05/2019   37 wks 1 days   btozer                       41 cm Vertex 136                     Yes  0cm / 30% / -4 124/66                     241 lbs 1+ none neg       Comments: Rm 19-- GBS today, doing well, tired, trouble sleeping. Vertex by Leopold's   12/11/2019   38 wks   btozer                       42 cm Breech                     156                     Yes  1cm / 30% / -4 128/80                     237 lbs 1+ none neg       Comments: Doing well, tired, trouble sleeping. 3yo is sick and she's worried about getting sick herself. Precautions reviewed. Breech today- reviewed risks/ benefits of ECV. Has f/u in one week. Plan 39 week c/s.   12/18/2019   39 wks   btozer                       43 cm Vertex 157                     Yes  1cm / 30% / -4 132/82                     241 lbs 1+ none neg       Comments: Patient has a head cold and is feeling under the weather. + trouble sleeping. Pt has concerns about baby's position-vertex today, confirmed by Leopold's. She was seen on L&D this week and feels her baby verted to vtx while she was there - unstable lie - will need an ultrasound if she presents in labor and prior to IOL. Reviewed risks/ benefits of IOL. Concerned about fetal size, S>D, but based on her last ultrasound baby would be estimated 9-9.5lbs and she's tested to 8lbs 15oz. Baby feels larger on exam - will repeat ultrasound today or tomorrow to make sure EFW is <5000g. Plan garduno tomorrow, IOL Friday. Allergies: Allergies not reviewed (last reviewed 09/06/2019)     CYANOCOBALAMIN (VITAMIN B12): Respiratory distress - thinks related to the amount and brand of medication.     MACROBID: Anaphylaxis - Reaction: Throat swelling;Severity: Critical; Comment: Entered By: Nav Iqbal - Team 2 MECSigned By: Ange Hermosillo MDType: Laverne Asia: [id number]: N;    Medications:  Reviewed Medications     nystatin 100,000 unit/gram topical powder  APPLY TO THE AFFECTED AREA(S) BY TOPICAL ROUTE 2 TIMES PER DAY 11/04/19   prescribed    Prenatal + DHA 05/14/19   entered    Vital Signs:  12/18/2019 10:25 am  Wt:                  241 lbs (109.32 kg) BP:                  132/82      Problems:  Reviewed Problems     Obesity - Onset: 2017 - Entered By: Aliya Poe MDSigned By: Aliya Poe MD Description: Obesity (BMI > 29.99) code: 278.00   Urinary tract infectious disease - Onset: 2019 - Rx Amoxicillin, KEERTHI neg   Excessive fetal growth affecting management of mother - Onset: 2017 - Entered By: Aliay Poe MDSigned By: Aliya Poe MD Description: Size greater than dates, 88% 34 wks code: 0.61   Multigravida of advanced maternal age - Onset: 2017 - Entered By: Aliya Poe MDSigned By: Aliya Poe MD Description: High risk pregnancy AMA multigravida code: V25.80    Primary  [id number] 10:00am [address] [facility], [facility] - [id number] at 9:00am  Past Medical History  Past Medical History not reviewed (last reviewed 2019)  Allergies (environmental/food):  Y  Anxiety Disorder: Y  Asthma: Y  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  Psychiatric Illness: Y - OCD    Family History:  Family History not reviewed (last reviewed 11/15/2019)    Mother - Hyperlipidemia     - Hypertensive disorder     - Diabetes mellitus     - Aortic valve stenosis   Maternal Grandfather                  - Heart disease   Father - Diabetes mellitus     - Hypertensive disorder     - Malignant tumor of vocal cord                      - Basal cell carcinoma of skin   Social History:  Social History not reviewed (last reviewed 2019)  Adult General Social History - Brief  On average, how many days per week do you engage in moderate to strenuous EXERCISE (like walking fast, running, jogging, dancing, swimming, biking, or other activities that cause a light or heavy sweat)?: 0  How often do you have a DRINK containing ALCOHOL?: Never  Marital status:   Number of children: 2  Occupation: [employer], RN Cardiac unit  Are you working: Yes  Illicit drugs: No  Tobacco Smoking Status: Former smoker  Most Recent Tobacco Use Screenin2019  Physical Exam  Patient is a 78-year-old female. Chaperone:Chaperone: present. Constitutional:General Appearance: normal general appearance and no acute distress. Mental Status Findings oriented to time, place, and person. Head:Head: normocephalic and atraumatic. Ears, Nose, Mouth, Throat:Mouth: good general condition. Cardiovascular:Heart Sounds and Pulses regular and no murmurs. Respiratory:Lungs unlabored respiratory effort and breath sounds normal.    Abdomen:Inspection and Palpation: gravid abdomen. Female :External genitalia: normal external genitalia. Vulva / Labia Majora normal vulva and labia major. Vagina: moist mucosa. Cervix: no discharge or cervical lesion; 2/50/-4, Grossly ruptured. Uterus: Gravid. Extremities:Extremities: no edema. Skin:General Skin no rash or suspicious lesions and normal general appearance. Lymphatics:Lymph Nodes no lymphadenopathy. OB Labs    Result Value Ref.  Range                    Date Collected Date Reviewed Reviewed By Note                      Initial Labs             Blood Type A  05/14/2019 05/16/2019                     [id number]                            D (Rh) Type Positive  05/14/2019 05/16/2019                     [id number]                            Antibody Screen Negative NEGATIVE 05/14/2019 05/16/2019                     [id number]                            Antibody Screen Negative NEGATIVE 10/04/2019                     10/07/2019                     btozer        HCT - Initial 40.8 % 34.0-46.6 05/14/2019 05/16/2019                     [id number]                            HGB - Initial 13.3 g/dL 11.1-15.9 05/14/2019 05/16/2019                     avqznxi68                            MCV - Initial 92 fL 79-97 05/14/2019 05/16/2019                     kcetbre21                            PLT - Initial 269 x10E3/uL 150-379 05/14/2019 05/16/2019                     svkoqzv86                            VDRL - Initial   05/14/2019 05/16/2019                     vnofsfd63                            Urine Culture/Screen Enterococcus faecalis  05/14/2019 05/20/2019                     zykxicm42                            Urine Culture/Screen Comment  06/11/2019 06/13/2019                     btozer        HBsAg Negative NEGATIVE 05/14/2019 05/16/2019                     ypdzpuu83                            HIV Counseling/Testing Non Reactive NON REACTIVE 05/14/2019 05/16/2019                     kzrmimq36                            Chlamydia - Initial Negative NEGATIVE 05/14/2019 05/20/2019                     nyymuxf81                            Gonorrhea - Initial Negative NEGATIVE 05/14/2019 05/20/2019                     lbjvcjs59                            Varicella             Rubella 4.45 index IMMUNE >0.99 05/14/2019 05/16/2019                     gchuael69                        Optional Labs             HGB Electrophoresis             PPD/Quanta             Pap Test NILM  05/14/2019 05/20/2019                     khivbui70                            Cystic Fibrosis             HPV Negative NEGATIVE 05/14/2019 05/20/2019                     wejeulb05                            Enzo-Sac             Familial Dysautonomia             Genetic Screening Tests             NST             TSH             Drug screen             HCV Ab             HCV RNA             Urinalysis             Rhogam Injection         8-20 Week Labs             Ultrasound - Initial             1st Trimester Aneuploidy Risk Assessment             MSAFP/Multiple Markers             2nd Trimester Serum Screening             Amnio/CVS             Karyotype             Amniotic Fluid (AFP)         24-28 Week Labs             HCT - 24-28 Weeks 34.0 % 34.0-46.6 10/04/2019                     10/07/2019                     btozer        HGB - 24-28 Weeks 11.2 g/dL 11.1-15.9 10/04/2019                     10/07/2019                     btozer        MCV - 24-28 Weeks             PLT - 24-28 Weeks 209 x10E3/uL 150-450 10/04/2019                     10/07/2019                     btozer        Diabetes Screen 124 mg/dL  10/04/2019                     10/07/2019                     btozer        GTT (If Screen Abnormal)             D (Rh) Antibody Screen         32-36 Week Labs             HCT - 32-36 Weeks             HGB - 32-36 Weeks             MCV - 32-36 Weeks             PLT - 32-36 Weeks             Ultrasound - 32-36 Weeks             HIV (When Indicated)             VDRL - 32-36 Weeks   10/04/2019                     10/07/2019                     btozer        Gonorrhea - 32-36 Weeks             Chlamydia - 32-36 Weeks             Depression screening (when indicated)         35-37 Week Labs             Group B Strep Negative NEGATIVE 12/05/2019 12/09/2019                     btozer        Resistance testing if penicillin allergic         Other Labs             Other - PAP, IG + CT/NG/TV + HR HPV + REFLEX HPV (11+92+03)   05/14/2019 05/20/2019                     cpqaybr59                        Vaccines  Vaccines not reviewed (last reviewed 09/27/2018)    Vaccine Type Date Amt. Route Site UlKeshia Opał[address] 47                     Lot # Mfr. Exp.   Date Date  on VIS VIS  Given Vaccinator   Diphtheria, Tetanus, Pertussis   Tdap 10/04/19                     0.5 mL Intramuscular                     Deltoid, Right  [id number] [employer]                     10/31/21                     02/24/15                     10/04/19                     Chris Patel                                                     Tdap 07/18/17                     0.5 mL    4BN7L [employer]                     09/13/19                     02/24/15                      Holly LPN - Team 4 [id number] [facility]                                                     Hepatitis B   Hep B 02/27/17                                                                                   Influenza   influenza, injectable, quadrivalent, preservative free 10/18/19                     0.5 mL Intramuscular                     Deltoid, Left  [id number]                     Seqirus 06/30/20                     08/15/19                     10/18/19                     Mylene Mcgee

## 2019-12-19 NOTE — PROGRESS NOTES
1554 Patient arrived from home for SROM around 0745 clear fluid noted by patient. Positive fetal movement, no vaginal bleeding or leaking of fluid at this time. [id number] Dr Edward Gone at bedside and bedside US done and verified head down    1145 Bedside and Verbal shift change report given to ARCADIO Samaniego RN (oncoming nurse) by Rossi Us RN (offgoing nurse). Report included the following information SBAR, MAR and Recent Results.

## 2019-12-19 NOTE — PROGRESS NOTES
1535. Bedside and Verbal shift change report given to scottie Johnson rn (oncoming nurse) by Mikael Avila rn (offgoing nurse). Report included the following information SBAR, Intake/Output, MAR and Recent Results. 1905. IUPC fell out while pt on bathroom commode. Will notify OB. 1935. Bedside and Verbal shift change report given to raghavendra Matos rn (oncoming nurse) by scottie Johnson rn (offgoing nurse). Report included the following information SBAR, Intake/Output, MAR and Recent Results.

## 2019-12-19 NOTE — PROGRESS NOTES
1145 Bedside report received from Coco Steven RN. Pt resting, reports occasional contractions. 1512 Dr Rashard Gonzalez at bedside, discussing plan of care with pt and family. 1515 SVE 3-4/50/-3, IUPC placed. 1540 Bedside report given to Jess Alonzo RN.

## 2019-12-20 LAB
BASOPHILS # BLD: 0 K/UL (ref 0–0.1)
BASOPHILS NFR BLD: 0 % (ref 0–1)
DIFFERENTIAL METHOD BLD: ABNORMAL
EOSINOPHIL # BLD: 0 K/UL (ref 0–0.4)
EOSINOPHIL NFR BLD: 0 % (ref 0–7)
ERYTHROCYTE [DISTWIDTH] IN BLOOD BY AUTOMATED COUNT: 14.6 % (ref 11.5–14.5)
HCT VFR BLD AUTO: 27.5 % (ref 35–47)
HGB BLD-MCNC: 8.6 G/DL (ref 11.5–16)
IMM GRANULOCYTES # BLD AUTO: 0 K/UL
IMM GRANULOCYTES NFR BLD AUTO: 0 %
LYMPHOCYTES # BLD: 1.2 K/UL (ref 0.8–3.5)
LYMPHOCYTES NFR BLD: 7 % (ref 12–49)
MCH RBC QN AUTO: 30.2 PG (ref 26–34)
MCHC RBC AUTO-ENTMCNC: 31.3 G/DL (ref 30–36.5)
MCV RBC AUTO: 96.5 FL (ref 80–99)
MONOCYTES # BLD: 0.5 K/UL (ref 0–1)
MONOCYTES NFR BLD: 3 % (ref 5–13)
NEUTS SEG # BLD: 15.7 K/UL (ref 1.8–8)
NEUTS SEG NFR BLD: 90 % (ref 32–75)
NRBC # BLD: 0 K/UL (ref 0–0.01)
NRBC BLD-RTO: 0 PER 100 WBC
PLATELET # BLD AUTO: 157 K/UL (ref 150–400)
PMV BLD AUTO: 10.4 FL (ref 8.9–12.9)
RBC # BLD AUTO: 2.85 M/UL (ref 3.8–5.2)
RBC MORPH BLD: ABNORMAL
WBC # BLD AUTO: 17.4 K/UL (ref 3.6–11)

## 2019-12-20 PROCEDURE — 77030011220 HC DEV ELECSURG COVD -B

## 2019-12-20 PROCEDURE — 76010000391 HC C SECN FIRST 1 HR: Performed by: OBSTETRICS & GYNECOLOGY

## 2019-12-20 PROCEDURE — 75410000003 HC RECOV DEL/VAG/CSECN EA 0.5 HR: Performed by: OBSTETRICS & GYNECOLOGY

## 2019-12-20 PROCEDURE — 65410000002 HC RM PRIVATE OB

## 2019-12-20 PROCEDURE — 74011000258 HC RX REV CODE- 258: Performed by: ANESTHESIOLOGY

## 2019-12-20 PROCEDURE — 74011250636 HC RX REV CODE- 250/636: Performed by: ADVANCED PRACTICE MIDWIFE

## 2019-12-20 PROCEDURE — 75410000002 HC LABOR FEE PER 1 HR

## 2019-12-20 PROCEDURE — 74011000250 HC RX REV CODE- 250: Performed by: ANESTHESIOLOGY

## 2019-12-20 PROCEDURE — 74011250636 HC RX REV CODE- 250/636: Performed by: ANESTHESIOLOGY

## 2019-12-20 PROCEDURE — 76010000392 HC C SECN EA ADDL 0.5 HR: Performed by: OBSTETRICS & GYNECOLOGY

## 2019-12-20 PROCEDURE — 74011250636 HC RX REV CODE- 250/636: Performed by: OBSTETRICS & GYNECOLOGY

## 2019-12-20 PROCEDURE — 77030012890

## 2019-12-20 PROCEDURE — 75410000000 HC DELIVERY VAGINAL/SINGLE

## 2019-12-20 PROCEDURE — 77030041076 HC DRSG AG OPTICELL MDII -A

## 2019-12-20 PROCEDURE — 36415 COLL VENOUS BLD VENIPUNCTURE: CPT

## 2019-12-20 PROCEDURE — 85025 COMPLETE CBC W/AUTO DIFF WBC: CPT

## 2019-12-20 PROCEDURE — 76060000078 HC EPIDURAL ANESTHESIA: Performed by: OBSTETRICS & GYNECOLOGY

## 2019-12-20 PROCEDURE — 77030040361 HC SLV COMPR DVT MDII -B

## 2019-12-20 PROCEDURE — 77030027138 HC INCENT SPIROMETER -A

## 2019-12-20 PROCEDURE — 75410000003 HC RECOV DEL/VAG/CSECN EA 0.5 HR

## 2019-12-20 RX ORDER — SODIUM CHLORIDE 0.9 % (FLUSH) 0.9 %
5-40 SYRINGE (ML) INJECTION EVERY 8 HOURS
Status: DISCONTINUED | OUTPATIENT
Start: 2019-12-20 | End: 2019-12-23 | Stop reason: HOSPADM

## 2019-12-20 RX ORDER — AMMONIA 15 % (W/V)
1 AMPUL (EA) INHALATION AS NEEDED
Status: DISCONTINUED | OUTPATIENT
Start: 2019-12-20 | End: 2019-12-23 | Stop reason: HOSPADM

## 2019-12-20 RX ORDER — SODIUM CHLORIDE, SODIUM LACTATE, POTASSIUM CHLORIDE, CALCIUM CHLORIDE 600; 310; 30; 20 MG/100ML; MG/100ML; MG/100ML; MG/100ML
INJECTION, SOLUTION INTRAVENOUS
Status: DISCONTINUED | OUTPATIENT
Start: 2019-12-20 | End: 2019-12-20 | Stop reason: HOSPADM

## 2019-12-20 RX ORDER — ONDANSETRON 2 MG/ML
INJECTION INTRAMUSCULAR; INTRAVENOUS AS NEEDED
Status: DISCONTINUED | OUTPATIENT
Start: 2019-12-20 | End: 2019-12-20 | Stop reason: HOSPADM

## 2019-12-20 RX ORDER — ACETAMINOPHEN 325 MG/1
650 TABLET ORAL
Status: DISCONTINUED | OUTPATIENT
Start: 2019-12-20 | End: 2019-12-23 | Stop reason: HOSPADM

## 2019-12-20 RX ORDER — FENTANYL CITRATE 50 UG/ML
INJECTION, SOLUTION INTRAMUSCULAR; INTRAVENOUS
Status: COMPLETED
Start: 2019-12-20 | End: 2019-12-20

## 2019-12-20 RX ORDER — CEFAZOLIN SODIUM/WATER 2 G/20 ML
2 SYRINGE (ML) INTRAVENOUS ONCE
Status: COMPLETED | OUTPATIENT
Start: 2019-12-20 | End: 2019-12-20

## 2019-12-20 RX ORDER — EPHEDRINE SULFATE/0.9% NACL/PF 50 MG/5 ML
12.5 SYRINGE (ML) INTRAVENOUS ONCE
Status: COMPLETED | OUTPATIENT
Start: 2019-12-20 | End: 2019-12-20

## 2019-12-20 RX ORDER — OXYCODONE AND ACETAMINOPHEN 5; 325 MG/1; MG/1
2 TABLET ORAL
Status: DISCONTINUED | OUTPATIENT
Start: 2019-12-20 | End: 2019-12-23 | Stop reason: HOSPADM

## 2019-12-20 RX ORDER — CEFAZOLIN SODIUM/WATER 2 G/20 ML
SYRINGE (ML) INTRAVENOUS
Status: COMPLETED
Start: 2019-12-20 | End: 2019-12-20

## 2019-12-20 RX ORDER — OXYTOCIN/RINGER'S LACTATE 20/1000 ML
999 PLASTIC BAG, INJECTION (ML) INTRAVENOUS AS NEEDED
Status: DISCONTINUED | OUTPATIENT
Start: 2019-12-20 | End: 2019-12-23 | Stop reason: HOSPADM

## 2019-12-20 RX ORDER — SODIUM CHLORIDE 9 MG/ML
150 INJECTION, SOLUTION INTRAVENOUS CONTINUOUS
Status: DISCONTINUED | OUTPATIENT
Start: 2019-12-20 | End: 2019-12-23 | Stop reason: HOSPADM

## 2019-12-20 RX ORDER — MORPHINE SULFATE 0.5 MG/ML
INJECTION, SOLUTION EPIDURAL; INTRATHECAL; INTRAVENOUS AS NEEDED
Status: DISCONTINUED | OUTPATIENT
Start: 2019-12-20 | End: 2019-12-20 | Stop reason: HOSPADM

## 2019-12-20 RX ORDER — HYDROCORTISONE 1 %
CREAM (GRAM) TOPICAL AS NEEDED
Status: DISCONTINUED | OUTPATIENT
Start: 2019-12-20 | End: 2019-12-23 | Stop reason: HOSPADM

## 2019-12-20 RX ORDER — KETOROLAC TROMETHAMINE 30 MG/ML
INJECTION, SOLUTION INTRAMUSCULAR; INTRAVENOUS AS NEEDED
Status: DISCONTINUED | OUTPATIENT
Start: 2019-12-20 | End: 2019-12-20 | Stop reason: HOSPADM

## 2019-12-20 RX ORDER — ACETAMINOPHEN 10 MG/ML
1000 INJECTION, SOLUTION INTRAVENOUS ONCE
Status: COMPLETED | OUTPATIENT
Start: 2019-12-20 | End: 2019-12-20

## 2019-12-20 RX ORDER — LIDOCAINE HYDROCHLORIDE AND EPINEPHRINE 20; 5 MG/ML; UG/ML
INJECTION, SOLUTION EPIDURAL; INFILTRATION; INTRACAUDAL; PERINEURAL
Status: COMPLETED
Start: 2019-12-20 | End: 2019-12-20

## 2019-12-20 RX ORDER — OXYTOCIN/RINGER'S LACTATE 20/1000 ML
PLASTIC BAG, INJECTION (ML) INTRAVENOUS
Status: DISCONTINUED | OUTPATIENT
Start: 2019-12-20 | End: 2019-12-20 | Stop reason: HOSPADM

## 2019-12-20 RX ORDER — OXYCODONE AND ACETAMINOPHEN 5; 325 MG/1; MG/1
1 TABLET ORAL
Status: DISCONTINUED | OUTPATIENT
Start: 2019-12-20 | End: 2019-12-23 | Stop reason: HOSPADM

## 2019-12-20 RX ORDER — ACETAMINOPHEN 10 MG/ML
1000 INJECTION, SOLUTION INTRAVENOUS
Status: DISCONTINUED | OUTPATIENT
Start: 2019-12-20 | End: 2019-12-22 | Stop reason: ALTCHOICE

## 2019-12-20 RX ORDER — SODIUM CHLORIDE, SODIUM LACTATE, POTASSIUM CHLORIDE, CALCIUM CHLORIDE 600; 310; 30; 20 MG/100ML; MG/100ML; MG/100ML; MG/100ML
125 INJECTION, SOLUTION INTRAVENOUS CONTINUOUS
Status: DISCONTINUED | OUTPATIENT
Start: 2019-12-20 | End: 2019-12-23 | Stop reason: HOSPADM

## 2019-12-20 RX ORDER — SODIUM CHLORIDE 0.9 % (FLUSH) 0.9 %
5-40 SYRINGE (ML) INJECTION AS NEEDED
Status: DISCONTINUED | OUTPATIENT
Start: 2019-12-20 | End: 2019-12-23 | Stop reason: HOSPADM

## 2019-12-20 RX ORDER — PHENYLEPHRINE HCL IN 0.9% NACL 0.4MG/10ML
SYRINGE (ML) INTRAVENOUS AS NEEDED
Status: DISCONTINUED | OUTPATIENT
Start: 2019-12-20 | End: 2019-12-20 | Stop reason: HOSPADM

## 2019-12-20 RX ORDER — ONDANSETRON 2 MG/ML
4 INJECTION INTRAMUSCULAR; INTRAVENOUS
Status: DISCONTINUED | OUTPATIENT
Start: 2019-12-20 | End: 2019-12-23 | Stop reason: HOSPADM

## 2019-12-20 RX ORDER — DOCUSATE SODIUM 100 MG/1
100 CAPSULE, LIQUID FILLED ORAL
Status: DISCONTINUED | OUTPATIENT
Start: 2019-12-20 | End: 2019-12-23 | Stop reason: HOSPADM

## 2019-12-20 RX ORDER — OXYTOCIN/RINGER'S LACTATE 20/1000 ML
125 PLASTIC BAG, INJECTION (ML) INTRAVENOUS AS NEEDED
Status: DISCONTINUED | OUTPATIENT
Start: 2019-12-20 | End: 2019-12-23 | Stop reason: HOSPADM

## 2019-12-20 RX ORDER — SIMETHICONE 80 MG
80 TABLET,CHEWABLE ORAL
Status: DISCONTINUED | OUTPATIENT
Start: 2019-12-20 | End: 2019-12-23 | Stop reason: HOSPADM

## 2019-12-20 RX ORDER — LIDOCAINE HYDROCHLORIDE AND EPINEPHRINE 20; 5 MG/ML; UG/ML
INJECTION, SOLUTION EPIDURAL; INFILTRATION; INTRACAUDAL; PERINEURAL AS NEEDED
Status: DISCONTINUED | OUTPATIENT
Start: 2019-12-20 | End: 2019-12-20 | Stop reason: HOSPADM

## 2019-12-20 RX ORDER — DIPHENHYDRAMINE HYDROCHLORIDE 50 MG/ML
12.5 INJECTION, SOLUTION INTRAMUSCULAR; INTRAVENOUS
Status: DISCONTINUED | OUTPATIENT
Start: 2019-12-20 | End: 2019-12-23 | Stop reason: HOSPADM

## 2019-12-20 RX ORDER — IBUPROFEN 400 MG/1
800 TABLET ORAL EVERY 8 HOURS
Status: DISCONTINUED | OUTPATIENT
Start: 2019-12-20 | End: 2019-12-23 | Stop reason: HOSPADM

## 2019-12-20 RX ADMIN — FENTANYL CITRATE 100 MCG: 50 INJECTION, SOLUTION INTRAMUSCULAR; INTRAVENOUS at 03:48

## 2019-12-20 RX ADMIN — KETOROLAC TROMETHAMINE 30 MG: 30 INJECTION, SOLUTION INTRAMUSCULAR; INTRAVENOUS at 05:17

## 2019-12-20 RX ADMIN — Medication 100 MCG: at 04:40

## 2019-12-20 RX ADMIN — Medication 999 ML/HR: at 04:27

## 2019-12-20 RX ADMIN — LIDOCAINE HYDROCHLORIDE,EPINEPHRINE BITARTRATE 5 ML: 15; .005 INJECTION, SOLUTION EPIDURAL; INFILTRATION; INTRACAUDAL; PERINEURAL at 04:59

## 2019-12-20 RX ADMIN — Medication 2 G: at 03:59

## 2019-12-20 RX ADMIN — SODIUM CHLORIDE 999 ML/HR: 900 INJECTION, SOLUTION INTRAVENOUS at 02:43

## 2019-12-20 RX ADMIN — Medication 80 MCG: at 04:49

## 2019-12-20 RX ADMIN — ONDANSETRON 4 MG: 2 INJECTION INTRAMUSCULAR; INTRAVENOUS at 11:16

## 2019-12-20 RX ADMIN — Medication 80 MCG: at 04:45

## 2019-12-20 RX ADMIN — LIDOCAINE HYDROCHLORIDE AND EPINEPHRINE 5 ML: 20; 5 INJECTION, SOLUTION EPIDURAL; INFILTRATION; INTRACAUDAL; PERINEURAL at 04:47

## 2019-12-20 RX ADMIN — LIDOCAINE HYDROCHLORIDE AND EPINEPHRINE 5 ML: 20; 5 INJECTION, SOLUTION EPIDURAL; INFILTRATION; INTRACAUDAL; PERINEURAL at 03:47

## 2019-12-20 RX ADMIN — SODIUM CHLORIDE, SODIUM LACTATE, POTASSIUM CHLORIDE, AND CALCIUM CHLORIDE 125 ML/HR: 600; 310; 30; 20 INJECTION, SOLUTION INTRAVENOUS at 13:24

## 2019-12-20 RX ADMIN — MORPHINE SULFATE 4 MG: 0.5 INJECTION, SOLUTION EPIDURAL; INTRATHECAL; INTRAVENOUS at 05:22

## 2019-12-20 RX ADMIN — OXYTOCIN-SODIUM CHLORIDE 0.9% IV SOLN 30 UNIT/500ML 2 MILLI-UNITS/MIN: 30-0.9/5 SOLUTION at 02:10

## 2019-12-20 RX ADMIN — AZITHROMYCIN MONOHYDRATE 500 MG: 500 INJECTION, POWDER, LYOPHILIZED, FOR SOLUTION INTRAVENOUS at 03:59

## 2019-12-20 RX ADMIN — LIDOCAINE HYDROCHLORIDE AND EPINEPHRINE 5 ML: 20; 5 INJECTION, SOLUTION EPIDURAL; INFILTRATION; INTRACAUDAL; PERINEURAL at 03:51

## 2019-12-20 RX ADMIN — Medication 12.5 MG: at 05:46

## 2019-12-20 RX ADMIN — PHENYLEPHRINE HYDROCHLORIDE 60 MCG/MIN: 10 INJECTION INTRAVENOUS at 03:59

## 2019-12-20 RX ADMIN — SODIUM CHLORIDE, SODIUM LACTATE, POTASSIUM CHLORIDE, AND CALCIUM CHLORIDE 125 ML/HR: 600; 310; 30; 20 INJECTION, SOLUTION INTRAVENOUS at 18:36

## 2019-12-20 RX ADMIN — ACETAMINOPHEN 1000 MG: 10 INJECTION, SOLUTION INTRAVENOUS at 18:36

## 2019-12-20 RX ADMIN — SODIUM CHLORIDE, POTASSIUM CHLORIDE, SODIUM LACTATE AND CALCIUM CHLORIDE: 600; 310; 30; 20 INJECTION, SOLUTION INTRAVENOUS at 03:59

## 2019-12-20 RX ADMIN — LIDOCAINE HYDROCHLORIDE AND EPINEPHRINE 5 ML: 20; 5 INJECTION, SOLUTION EPIDURAL; INFILTRATION; INTRACAUDAL; PERINEURAL at 03:49

## 2019-12-20 RX ADMIN — ONDANSETRON HYDROCHLORIDE 4 MG: 2 INJECTION, SOLUTION INTRAMUSCULAR; INTRAVENOUS at 04:19

## 2019-12-20 RX ADMIN — ACETAMINOPHEN 1000 MG: 10 INJECTION, SOLUTION INTRAVENOUS at 00:39

## 2019-12-20 RX ADMIN — ACETAMINOPHEN 1000 MG: 10 INJECTION, SOLUTION INTRAVENOUS at 09:54

## 2019-12-20 NOTE — PROGRESS NOTES
Labor Progress Note  Called by CNM to review FHT and labor course. While she has made some cervical change and is now 9 cm, the fetal station remains high at -1 and she persistently has variables and prolonged decelerations lasting upwards of 4 minutes with most of her contractions and baseline is tachycardic. Physical Exam:  Cervical Exam: /-1  Membranes:  SROM  Uterine Activity: q 2-4 min  Fetal Heart Rate: 170s minimal to moderate variability, no accelerations, variable decelerations and late decelerations    Category 2, overall concerning    Assessment/Plan:  Discussed labor course with patient and her family in the room. Given the inability to augment her labor persistent non-reassuring fetal heart tracing.  is recommended. In depth discussion was held regarding risks and benefits. Patient has agreed to proceed with operative delivery. She was re-examined just prior to moving to the OR. Exam was unchanged.     Micah Cardoza MD

## 2019-12-20 NOTE — ROUTINE PROCESS
TRANSFER - IN REPORT: 
 
Verbal report received from Lu Trinidad RN.(name) on Taylor Pelt  being received from L&D(unit) for routine progression of care Report consisted of patients Situation, Background, Assessment and  
Recommendations(SBAR). Information from the following report(s) SBAR was reviewed with the receiving nurse. Opportunity for questions and clarification was provided. Assessment completed upon patients arrival to unit and care assumed. 1310: At 4 hour vitals and assessment, while patient pulling self up in bed, patient had gush of blood and passed small clots. Patient fundus Firm U-2, no trickling noted after cleaning patient and changing carley pad and bed pad. Also, notified MD of patient output.  Instructed to notify MD if output < 30 mL/ hr.

## 2019-12-20 NOTE — PROGRESS NOTES
Variable decelerations noted with contractions. To start amnioinfusion. 200 ml normal saline bolus, then 150 ml/hr after. Discussed r/b with patient. Patient agreeable and wanting to proceed. Will continue to monitor closely.

## 2019-12-20 NOTE — PROGRESS NOTES
0741 Bedside and Verbal shift change report given to MARIN Batres RN  (oncoming nurse) by Eduardo Figueroa RN (offgoing nurse). Report included the following information SBAR, OR Summary, Procedure Summary, Intake/Output, MAR and Recent Results. 0741 MEWS of 3 due to elevated pulse. Will continue to monitor. 0800 MIU called and notified of patient's pending transfer in 15 minutes. Dream.Counter Dr. Shaye Obrien at bedside assessing patient. 0820 TRANSFER - OUT REPORT:    Verbal report given to ARCADIO Mittal RN(name) on Kj Salvador  being transferred to MIU (unit) for routine post - op       Report consisted of patients Situation, Background, Assessment and   Recommendations(SBAR). Information from the following report(s) SBAR, OR Summary, Procedure Summary, Intake/Output and MAR was reviewed with the receiving nurse. Lines:   Peripheral IV 12/19/19 Right;Posterior Arm (Active)   Site Assessment Clean, dry, & intact 12/20/2019  7:53 AM   Phlebitis Assessment 0 12/20/2019  7:53 AM   Infiltration Assessment 0 12/20/2019  7:53 AM   Dressing Status Clean, dry, & intact 12/20/2019  7:53 AM   Dressing Type Tape;Transparent 12/20/2019  7:53 AM   Hub Color/Line Status Pink; Infusing 12/20/2019  7:53 AM   Alcohol Cap Used Yes 12/19/2019  7:48 PM        Opportunity for questions and clarification was provided.       Patient transported with:   Registered Nurse

## 2019-12-20 NOTE — PROGRESS NOTES
[facility] Labor Progress Note     Patient: Quiana Goncalves MRN: [id number]  SSN: [id number]    YOB: 1980  Age: 44 y.o. Sex: female      Assumed care at 5    Late entry    Subjective:   Patient coping well with contractions. Is starting to breath through them. Reports they are becoming more intense. Objective:   Blood pressure 135/77, pulse 94, temperature 98.4 °F (36.9 °C), resp. rate 16, height 5' 4\" (1.626 m), weight 108 kg (238 lb), last menstrual period 2019, not currently breastfeeding. Fetal heart baseline 145, moderate variability, positive accelerations, negative decelerations, Uterine contractions q 1-2 minutes, strong to palpation, resting tone soft, Sterile Vaginal Exam: 4 cm dilated/ 50 % effaced/ -3 station, sutures palpated, cervix anterior, fetal presentation vertex, membranes ruptured for continued clear fluid    Pitocin at 16 mu/min    IUPC replaced without complication    Patient Vitals for the past 4 hrs:   Temp Pulse Resp BP   19 1948 98.4 °F (36.9 °C) 94 16 135/77   19 1815 98.5 °F (36.9 °C)          Assessment:       39w1d  Category 1 fetal heart rate tracing   IOL for SROM (SROM x 14.5 hours)    Plan:   Discussed r/b of IUPC and need to monitor contraction pattern. Pt agreeable. IUPC replaced without difficulty. Pt and fetus tolerated well. Titrate pitocin to adequate contractions pattern. Epidural at client request for pain control.    Recheck cervix 4 hours, sooner with maternal or fetal indication   Anticipate PILI Frank CNM

## 2019-12-20 NOTE — PROGRESS NOTES
Prolonged deceleration for 5 minutes prior to amnioinfusion starting. SVE 7/90/-3, stretchy. Fetal response to scalp stimulation noted. To start amnioinfusion and continue to monitor closely.

## 2019-12-20 NOTE — ANESTHESIA PROCEDURE NOTES
Epidural Block    Start time: 12/19/2019 10:00 PM  End time: 12/19/2019 10:15 PM  Performed by: Chantel Glaser MD  Authorized by: Chantel Glaser MD     Pre-Procedure  Indication: labor epidural    Preanesthetic Checklist: patient identified, risks and benefits discussed, anesthesia consent, patient being monitored, timeout performed and anesthesia consent    Timeout Time: 22:00        Epidural:   Patient position:  Left lateral decubitus  Prep region:  Lumbar  Prep: Chlorhexidine    Location:  L2-3    Needle and Epidural Catheter:   Needle Type:  Tuohy  Needle Gauge:  17 G  Injection Technique:  Loss of resistance using air  Attempts:  1  Catheter Size:  19 G  Events: no blood with aspiration, no cerebrospinal fluid with aspiration, no paresthesia and negative aspiration test    Test Dose:  Bupivacaine 0.25% and negative    Assessment:   Catheter Secured:  Tegaderm and tape  Insertion:  Uncomplicated  Patient tolerance:  Patient tolerated the procedure well with no immediate complications

## 2019-12-20 NOTE — PROGRESS NOTES
Sign out received. Assumed care of patient. 38yo  at 39w1d. PROM EFW 9 1/2 lbs. Pelvis proven to 8lb 15 oz IUPC in place. GBS negative.

## 2019-12-20 NOTE — L&D DELIVERY NOTE
Operative Note    Name: Jeremiah Salomon   Medical Record Number: [id number]   YOB: 1980  Today's Date: 2019      Pre-operative Diagnosis: Fetal Intolerance of Labor    Post-operative Diagnosis: Fetal intolerance of Labor delivered    Operation: Low Cervical Transverse Procedure(s):   SECTION    Surgeon(s):  Oz Chacon MD    Anesthesia: Epidural    Prophylactic Antibiotics: Ancef and Zithromax  DVT Prophylaxis: Sequential Compression Devices         Fetal Description: leoanrd     Birth Information:   Information for the patient's :  Shelley Bragg [[id number]]   Delivery of a   female infant on 2019 at 4:26 AM. Apgars were 9  and 9 . Umbilical Cord: 3 Vessels     Umbilical Cord Events: Nuchal Cord With Compressions     Placenta: Manual Removal removal with Normal appearance. Amniotic Fluid Volume:        Amniotic Fluid Description:  Clear        Umbilical Cord: nuchal cord x 2, body cord x 1    Placenta:  expressed    Specimens: Cord gas taken           Complications:  none    Procedure Detail:      After proper patient identification and consent, the patient was taken to the operating room, where spinal anesthesia was administered and found to be adequate. Tapia catheter had been placed using sterile technique. The patient was prepped and draped in the normal sterile fashion. The abdomen was entered using the Pfannenstiel technique. The peritoneum was entered sharply well superior to the bladder without any apparent injury. The bladder flap was created without difficulty. A low transverse uterine incision was made with the scalpel and extended with blunt finger dissection. Amniotomy was performed and the fluid was medium amount clear. The babys head was then delivered atraumatically. The nose and mouth were suctioned. The cord was clamped and cut. Segment of umbilical cord was clamped and cut for cord gas.  30 seconds were allowed to elapse while  was stimulated and suctioned and the baby was handed off to  staff in attendance. Placenta was then removed from the uterus. The uterus was curettaged with a moist lap pad and cleared of all clots and debris. The uterine incision was closed with 0 vicryl, double layer  in running locking fashion. Multiple figure of 8 sutures were required to achieve hemostasis. The posterior cul-de-sac was cleaned of clots and debris and inspected. There was a small posterior serosal tear which was bleeding and repaired with 0-vicryl. It was again inspected and found to be hemostatic. The uterus was returned to the abdomen and observed in its normal anatomic position. Good hemostasis was again reassured throughout. Tubes and ovaries were normal bilaterally. The fascia was closed with 0 Vicryl in a running fashion. Good hemostasis was assured. The subcutaneous layer was closed using 3-0 vicryl and skin was closed with a 4-0 Monocryl closure. The patient tolerated the procedure well. Sponge, lap, and needle counts were correct times three and the patient and baby were taken to recovery/postpartum room in stable condition.     Cristo Tucker MD  2019  5:42 AM

## 2019-12-20 NOTE — ANESTHESIA POSTPROCEDURE EVALUATION
Post-Anesthesia Evaluation and Assessment    Patient: Lanney Kanner MRN: [id number]  SSN: [id number]    YOB: 1980  Age: 44 y.o. Sex: female      I have evaluated the patient and they are stable and ready for discharge from the PACU. Cardiovascular Function/Vital Signs  Visit Vitals  /60   Pulse (!) 118   Temp 37.1 °C (98.8 °F)   Resp 14   Ht 5' 4\" (1.626 m)   Wt 108 kg (238 lb)   SpO2 100%   BMI 40.85 kg/m²       Patient is status post Epidural anesthesia for Procedure(s):   SECTION. Nausea/Vomiting: None    Postoperative hydration reviewed and adequate. Pain:  Pain Scale 1: Numeric (0 - 10) (19)  Pain Intensity 1: 0 (19)   Managed    Neurological Status:   Neuro (WDL): Within Defined Limits (19 0534)   At baseline    Mental Status, Level of Consciousness: Alert and  oriented to person, place, and time    Pulmonary Status:   O2 Device: Nasal cannula (19 0536)   Adequate oxygenation and airway patent    Complications related to anesthesia: None    Post-anesthesia assessment completed. No concerns    Signed By: Demarcus Nicole MD     2019              Procedure(s):   SECTION. epidural    <BSHSIANPOST>    Vitals Value Taken Time   /60 2019  7:26 AM   Temp 37.1 °C (98.8 °F) 2019  5:34 AM   Pulse 116 2019  7:26 AM   Resp 14 2019  5:34 AM   SpO2 100 % 2019  7:19 AM   Vitals shown include unvalidated device data.

## 2019-12-20 NOTE — PROGRESS NOTES
Late entry -     Patient was seen this morning on labor and delivery  She was not in pain but complained of hot flashes and feeling tired  Bandage is dry, lochia appropriate  Patient is tachycardic, urine is concentrated - receiving a bolus now and will monitor vitals today

## 2019-12-20 NOTE — PROGRESS NOTES
[facility] Labor Progress Note     Patient: Bird Bland MRN: [id number]  SSN: [id number]    YOB: 1980  Age: 44 y.o. Sex: female        Subjective:   Patient reports feeling a lot of rectal pressure and pain. Is tired. Reports she would like a vaginal delivery, but is okay proceeding with  at this time. Objective:   Blood pressure 134/71, pulse (!) 127, temperature 99.5 °F (37.5 °C), resp. rate 16, height 5' 4\" (1.626 m), weight 108 kg (238 lb), last menstrual period 2019, not currently breastfeeding. Fetal heart baseline 170, minimal to moderate variability, negative accelerations, positive prolonged late decelerations and variable decelerations, Uterine contractions q 6 minutes, moderate to palpation, resting tone soft, Sterile Vaginal Exam: 9 cm dilated/ 90 % effaced/ -1 station, cervix anterior, fetal presentation vertex, membranes ruptured for continued clear fluid. FSE and IUPC remain in place    Patient Vitals for the past 4 hrs:   Temp Pulse BP   19 0257 99.5 °F (37.5 °C)     19 0156  (!) 127 134/71   19 0145 99.8 °F (37.7 °C)     19 0126  (!) 129 95/51   19 0056  (!) 126 99/54   19 0025 99.8 °F (37.7 °C) (!) 134 123/66   19 0011  (!) 126 102/51   19 2356  (!) 125 112/53       Assessment:     39w2d  Category 2 fetal heart rate tracing   SROM x 21 hours  Fetal intolerance of labor, inability to titrate pitocin    Plan:   Discussed with patient inability to titrate pitocin and fetal intolerance of labor to this point. Discussed that amnioinfusion as not been effective to decrease deceleration pattern. Discussed that at this time,  is warranted and no other actions can be taken to progress toward vaginal delivery. Dr. Tori Artis asked to come to bedside to assess and discussed . Dr. Tori Artis at bedside and in agreement with POC. Care handed to Dr. Tori Artis to proceed with .     Jaime Ko, CNM

## 2019-12-20 NOTE — PROGRESS NOTES
1930 - Bedside shift change report given to Tate Sapp RN (oncoming nurse) by Brian Roach RN (offgoing nurse). Report included the following information SBAR. IUPC dislocated while going to the bathroom before shift change, but toco picking up contractions. 2000 - RN at bedside adjusting [address]. Patient lost her step dad yesterday to cancer and was feeling emotional. RN talking to patient about the loss and family arrived to visit with patient and her . 2030 - RN at bedside adjusting toco and helping patient on birthing ball. Breathing through contractions but denies wanting an epidural at this time. 2055 - Called [facility] to come to replace IUPC. Liliane Lindsey had signed out to [facility]. 2100 - CHOLO Malone CNM at bedside to replace IUPC. SVE 4    2120 - IV bolus started    2130 - Called Anesthesia for epidural orders to be entered. 2145 - RN at bedside to prep for epidural.    2155 - Anesthesia called for epidural    2205 - Anesthesia at bedside for epidural    2215 - Patient on left side    2226 - Patient on right side    2230 - RN at bedside, post epidural.     2241 - Patient on left side    2245 - RN at bedside, O2 via nasal mask, ephedrine given    2250 - Patient on right side    2315 - CHOLO Malone CNM at bedside to check patient. SVE 5/80/-3 and placed FSE due FHR tracing challenges. 2345 - RN at bedside, garduno placed, turned on left side and in [place]    0000 - Patient turn on right side    0020 - RN at bedside, Turned to left side    [address] to disclose 99.8 temp. Verbal order received to give one dose of IV tylenol. Reji - RN at bedside. SVE 5/80/-2 by Ha Curtis RN. Turned on right side. Called CHOLO Malone CNM to come to the bedside. [address] at bedside and discussed plan of care.  Verbal order received to restart pitocin and monitor FHR and tolerance    0203 - Verbal order received to stop pitocin by CHOLO Malone CNM    210 - Verbal order received from Dr Liliane Lindsey to restart pitocin. 0 - Verbal order received to initiate amnioinfusion. 0230 - RN at bedside to begin Amnio Infusion. Notable deceleration heard upon entering room, turned patient to left side. 1661 - L Kira CNM at bedside, SVE 7cm and Scalp Stimulation    0252 - Verbal order received to increase pitocin from Gisela Champagne - Dr Maren Mendez and Ashley Corral CNM at bedside, SVE 7cm and discussed potential for needed C Section. Stayed at bedside and discussed options of giving a bit more time. 0677 - Verbal order received from Dr Maren Mendez to stop pitocin.  [address] at bedside again to check patient. SVE 9/80/-1    0343  - Dr Maren Mendez at bedside, verbal order received to retsart pitocin at 1 mu/min    0345 - Pitocin stopped, C section called     [place] SVE 9/90/-1    0411 - Move to OR    0534 - Back in L&D Room, BP 82/47.    0542 - Called Anesthesia. Verbal order received for 12.5 mg of Ephedrine    0547 - Dr Muriel Alexander at bedside and admin a dose of Rush.    0730 - Bedside shift change report given to Art Cheatham RN (oncoming nurse) by Theodore Moss RN (offgoing nurse). Report included the following information SBAR.

## 2019-12-20 NOTE — ANESTHESIA PREPROCEDURE EVALUATION
Relevant Problems   No relevant active problems       Anesthetic History   No history of anesthetic complications            Review of Systems / Medical History  Patient summary reviewed, nursing notes reviewed and pertinent labs reviewed    Pulmonary            Asthma : well controlled       Neuro/Psych   Within defined limits           Cardiovascular  Within defined limits                     GI/Hepatic/Renal  Within defined limits              Endo/Other        Morbid obesity     Other Findings              Physical Exam    Airway  Mallampati: II  TM Distance: > 6 cm  Neck ROM: normal range of motion   Mouth opening: Normal     Cardiovascular  Regular rate and rhythm,  S1 and S2 normal,  no murmur, click, rub, or gallop             Dental  No notable dental hx       Pulmonary  Breath sounds clear to auscultation               Abdominal  GI exam deferred       Other Findings            Anesthetic Plan    ASA: 3  Anesthesia type: epidural          Induction: Intravenous  Anesthetic plan and risks discussed with: Patient

## 2019-12-20 NOTE — LACTATION NOTE
This note was copied from a baby's chart. Initial Lactation Consultation - Baby born by  early this morning to a  mom at 44 2/7 weeks gestation. Her first child is 16years old and she did not breast feed her. Her second child is 3years old. Mom attempted to breast him but he had issues latching. She tried pumping but did not have success with that either. Mom would like to breast feed this baby. Mom is not feeling well after her  and she has not been able to breast feed. I helped get baby latched this afternoon. I placed baby on mom in the prone position. Baby got a deep latch and began sucking rhythmically with frequent, audible swallows. Mom was not able to hold the baby at the breast. I held her at the breast for the entire feeding. We were able to get baby latched and nursing on both breasts. Feeding Plan: Mother will keep baby skin to skin as often as possible, feed on demand, respond to feeding cues, obtain latch, listen for audible swallowing, be aware of signs of oxytocin release/ cramping, thirst and sleepiness while breastfeeding. Mom will not limit the time the baby is at the breast. She will allow the baby to completely finish one breast and the offer the second breast at each feeding. She will call out as needed.

## 2019-12-20 NOTE — ROUTINE PROCESS
Bedside shift change report given to GLENDA Russell RN (oncoming nurse) by Kirti Mckenzie RN (offgoing nurse). Report included the following information SBAR.

## 2019-12-20 NOTE — PROGRESS NOTES
[facility] Labor Progress Note     Patient: Dang Rabago MRN: [id number]  SSN: [id number]    YOB: 1980  Age: 44 y.o. Sex: female        Subjective:   Patient resting with contractions. Family remains at bedside providing support. Objective:   Blood pressure 95/51, pulse (!) 129, temperature 99.8 °F (37.7 °C), resp. rate 16, height 5' 4\" (1.626 m), weight 108 kg (238 lb), last menstrual period 2019, not currently breastfeeding. Fetal heart baseline 165, moderate variability, occasional positive accelerations, positive decelerations 2-4 minutes, one late in nature, Uterine contractions q 3-5 minutes, moderate to palpation, resting tone soft, Sterile Vaginal Exam: 5 cm dilated/ 80 % effaced/ -3 station,  fetal presentation vertex, membranes ruptured for continued clear fluid    FSE and IUPC in place    Patient Vitals for the past 4 hrs:   Temp Pulse BP   19 0145 99.8 °F (37.7 °C)     19 0126  (!) 129 95/51   19 0056  (!) 126 99/54   19 0025 99.8 °F (37.7 °C) (!) 134 123/66   19 0011  (!) 126 102/51   19 2356  (!) 125 112/53   19 2340  (!) 129 103/56   19 2330 99 °F (37.2 °C)     19 2326 99 °F (37.2 °C) (!) 112 115/57   19 2311  (!) 133 116/58   19 2255  (!) 126 111/62   19 2251  (!) 112 105/57   19 2241  (!) 111 101/64   19 2226 99 °F (37.2 °C) (!) 113 147/60   19 2215  (!) 127 135/77       Pitocin off    Assessment:     39w2d  Category 2 fetal heart rate tracing   IOL for SROM  SROM x 19 hours  Increasing temp    Plan:   Reviewed strip with Dr. Elda Jones, reviewed POC. POC developed with Dr. Elda Jones  To restart pitocin and titrate to adequate pattern as fetus tolerates. If repeative lates or fetal distress to turn off pitocin and proceed with . If fetus tolerates to have adequate contraction pattern for 6 hours and determine lack of cervical change and consider CPD.    Discussed with patient and family, they verbalized understanding of risks and agreement to proceed. Close fetal and maternal monitoring. If maternal fever of 100.4 x 2 incidents 30 minutes apart will proceed with triple I treatment.        Nav Zepeda CNM

## 2019-12-20 NOTE — PROGRESS NOTES
[facility] Labor Progress Note     Patient: Stacia Palumbo MRN: [id number]  SSN: [id number]    YOB: 1980  Age: 44 y.o. Sex: female        Subjective:   Patient coping well with contractions. Is now comfortable with epidural. Feeling anxious after ephedrine. Objective:   Blood pressure 111/62, pulse (!) 126, temperature 98.4 °F (36.9 °C), resp. rate 16, height 5' 4\" (1.626 m), weight 108 kg (238 lb), last menstrual period 2019, not currently breastfeeding. Fetal heart baseline 150, moderate variability, positive accelerations, negative decelerations, Uterine contractions q 2-3 minutes,strong to palpation, resting tone soft, Sterile Vaginal Exam: 5 cm dilated/ 80 % effaced/ -2 station, cervix midline, fetal presentation vertex, membranes ruptured for continued clear fluid. FSE placed without complication    IUPC remains in place    Pitocin at 2 mu/min    Patient Vitals for the past 4 hrs:   Temp Pulse Resp BP   19 2255  (!) 126  111/62   19 2251  (!) 112  105/57   19 2241  (!) 111  101/64   19 2226  (!) 113  147/60   19 2215  (!) 127  135/77   19 2213  (!) 126  132/77   19 1948 98.4 °F (36.9 °C) 94 16 135/77       Assessment:     39w1d  Category 1 fetal heart rate tracing   IOL for SROM  SROM x 16 hours    Plan:   Discussed r/b of FSE, patient verbalized understanding and agreement. FSE placed without difficulty, patient and fetus tolerated well. Discussed difficult to monitor fetus and confirm fetal verses maternal heart tones. Continue to monitor contraction pattern with IUPC and MVUs, titrate pitocin to adequacy. Recheck cervix 4 hours, sooner with maternal or fetal indication.   Anticipate PILI Ac

## 2019-12-21 LAB
BASOPHILS # BLD: 0 K/UL (ref 0–0.1)
BASOPHILS NFR BLD: 0 % (ref 0–1)
DIFFERENTIAL METHOD BLD: ABNORMAL
EOSINOPHIL # BLD: 0.1 K/UL (ref 0–0.4)
EOSINOPHIL NFR BLD: 0 % (ref 0–7)
ERYTHROCYTE [DISTWIDTH] IN BLOOD BY AUTOMATED COUNT: 14.7 % (ref 11.5–14.5)
HCT VFR BLD AUTO: 24.1 % (ref 35–47)
HGB BLD-MCNC: 7.7 G/DL (ref 11.5–16)
IMM GRANULOCYTES # BLD AUTO: 0.1 K/UL (ref 0–0.04)
IMM GRANULOCYTES NFR BLD AUTO: 1 % (ref 0–0.5)
LYMPHOCYTES # BLD: 2.2 K/UL (ref 0.8–3.5)
LYMPHOCYTES NFR BLD: 16 % (ref 12–49)
MCH RBC QN AUTO: 30.6 PG (ref 26–34)
MCHC RBC AUTO-ENTMCNC: 32 G/DL (ref 30–36.5)
MCV RBC AUTO: 95.6 FL (ref 80–99)
MONOCYTES # BLD: 0.6 K/UL (ref 0–1)
MONOCYTES NFR BLD: 5 % (ref 5–13)
NEUTS SEG # BLD: 11.2 K/UL (ref 1.8–8)
NEUTS SEG NFR BLD: 78 % (ref 32–75)
NRBC # BLD: 0 K/UL (ref 0–0.01)
NRBC BLD-RTO: 0 PER 100 WBC
PLATELET # BLD AUTO: 157 K/UL (ref 150–400)
PMV BLD AUTO: 10.7 FL (ref 8.9–12.9)
RBC # BLD AUTO: 2.52 M/UL (ref 3.8–5.2)
WBC # BLD AUTO: 14.2 K/UL (ref 3.6–11)

## 2019-12-21 PROCEDURE — 85025 COMPLETE CBC W/AUTO DIFF WBC: CPT

## 2019-12-21 PROCEDURE — 36415 COLL VENOUS BLD VENIPUNCTURE: CPT

## 2019-12-21 PROCEDURE — 74011250637 HC RX REV CODE- 250/637: Performed by: OBSTETRICS & GYNECOLOGY

## 2019-12-21 PROCEDURE — 65410000002 HC RM PRIVATE OB

## 2019-12-21 RX ADMIN — ACETAMINOPHEN 650 MG: 325 TABLET ORAL at 13:11

## 2019-12-21 RX ADMIN — IBUPROFEN 800 MG: 400 TABLET ORAL at 10:07

## 2019-12-21 RX ADMIN — ACETAMINOPHEN 650 MG: 325 TABLET ORAL at 20:38

## 2019-12-21 RX ADMIN — IBUPROFEN 800 MG: 400 TABLET ORAL at 17:49

## 2019-12-21 RX ADMIN — IBUPROFEN 800 MG: 400 TABLET ORAL at 02:19

## 2019-12-21 NOTE — ROUTINE PROCESS
2010: In to draw patients CBC. Patient reported large gush of blood. Fundus firm at U- no trickling noted. Small amount of blood on pad. Assisted with carley care and pad changed.

## 2019-12-21 NOTE — LACTATION NOTE
This note was copied from a baby's chart. Assisted parents to latch infant in football position with pillow supports. Mom ceased breast feeding her 3yo at 2 weeks due to painful nipples. Discussed with family characteristics deep v shallow latch, use of pillows to support nursing couplet, different positions, bringing infant toward mom, and taught family breast massage and hand expression with large drops of colostrum visible. Assisted family to latch infant in football position. Baby nursing well today,  deep latch obtained, mother is comfortable, baby feeding vigorously with rhythmic suck, swallow, breathe pattern, audible swallowing, and evident milk transfer, both breasts offered, baby is asleep following feeding.  behaviors reviewed, Very sleepy in first 24 hours, mother must wake baby for feedings, offer hand expressed drops, baby usually will respond and feed vigorously 6-8 times in the first day, but is important to offer 8-12 times,  After baby wakes from deep sleep usually on the 2nd or 3rd day a new behavior pattern follows. Frequent feeding during this brief behavioral phase preceeding milk transition is called cluster feeding. Typical  behavior: baby becomes vigorous at the breast and wants to feed frequently- every 1-2 hours for several feedings. This is the normal process by which the baby demands his/her supply. This type of frequent feeding is the stimulation which causes lactogenesis II (milk coming in). Feeding Plan: Mother will keep baby skin to skin as often as possible, feed on demand, 8-12x/day , respond to feeding cues, obtain latch, listen for audible swallowing, be aware of signs of oxytocin release/ cramping,thirst,sleepiness while breastfeeding, offer both breasts,and will not limit feedings. Mother agrees to utilize breast massage while nursing to facilitate lactogenesis.

## 2019-12-21 NOTE — PROGRESS NOTES
Post-Operative  Day 1    Bird Bland     Assessment: Post-Op day 1 s/p 1LTCS for NRFS, stable    Plan:   1. Routine post-operative care   2. Female , doing well   3. Acute blood loss anemia: significant drop in hemoglobin, which I suspect is the cause of her tachycardia. Appearance of ongoing drop may be dilutional as her clinical symptoms of blood loss have resolved and her UOP picked-up overnight (1550 overnight). Encourage PO hydration today, one dose venofer today, and repeat CBC tomorrow AM.  Plan supplemental iron at discharge. *addendum - patient declined venofer, she's worried about the risk of allergic reaction. Plan supplemental iron at discharge and increase iron rich foods. Information for the patient's :  Christian Araya [[id number]]   , Low Transverse     Subjective:  Patient doing well without significant complaint-she's feeling MUCH better today. Nausea has resolved. Lightheadedness has also resolved. Tolerating her diet. Tapia removed and patient voiding spontaneously. +flatus today. Vitals:  Visit Vitals  /62 (BP 1 Location: Left arm, BP Patient Position: At rest)   Pulse (!) 108   Temp 97.5 °F (36.4 °C)   Resp 16   Ht 5' 4\" (1.626 m)   Wt 108 kg (238 lb)   LMP 2019   SpO2 100%   Breastfeeding Unknown   BMI 40.85 kg/m²     Temp (24hrs), Av °F (36.7 °C), Min:97.5 °F (36.4 °C), Max:98.4 °F (36.9 °C)      Last 24hr Input/Output:    Intake/Output Summary (Last 24 hours) at 2019 0734  Last data filed at 2019 0430  Gross per 24 hour   Intake    Output 1975 ml   Net -1975 ml          Exam:        Patient without distress. Lungs clear.   Abdomen: soft, fundus firm, appropriate periincisional TTP, bandage intact and dry, +BS      Perineum normal lochia noted                 Lower extremities: trace edema, no calf tenderness    Labs:   Lab Results   Component Value Date/Time    WBC 14.2 (H) 2019 06:01 AM    WBC 17. 4 (H) 12/20/2019 07:09 PM    WBC 11.4 (H) 12/19/2019 10:01 AM    WBC 8.0 01/11/2019 03:10 PM    WBC 12.2 (H) 10/07/2017 12:45 AM    HGB 7.7 (L) 12/21/2019 06:01 AM    HGB 8.6 (L) 12/20/2019 07:09 PM    HGB 12.7 12/19/2019 10:01 AM    HGB 12.7 01/11/2019 03:10 PM    HGB 11.4 (L) 10/07/2017 12:45 AM    HCT 24.1 (L) 12/21/2019 06:01 AM    HCT 27.5 (L) 12/20/2019 07:09 PM    HCT 39.7 12/19/2019 10:01 AM    HCT 37.3 01/11/2019 03:10 PM    HCT 35.2 10/07/2017 12:45 AM    PLATELET 438 68/06/1088 06:01 AM    PLATELET 921 38/07/7587 07:09 PM    PLATELET 856 62/54/3531 10:01 AM    PLATELET 234 39/25/6396 03:10 PM    PLATELET 155 88/47/2610 12:45 AM       Recent Results (from the past 24 hour(s))   CBC WITH AUTOMATED DIFF    Collection Time: 12/20/19  7:09 PM   Result Value Ref Range    WBC 17.4 (H) 3.6 - 11.0 K/uL    RBC 2.85 (L) 3.80 - 5.20 M/uL    HGB 8.6 (L) 11.5 - 16.0 g/dL    HCT 27.5 (L) 35.0 - 47.0 %    MCV 96.5 80.0 - 99.0 FL    MCH 30.2 26.0 - 34.0 PG    MCHC 31.3 30.0 - 36.5 g/dL    RDW 14.6 (H) 11.5 - 14.5 %    PLATELET 692 432 - 412 K/uL    MPV 10.4 8.9 - 12.9 FL    NRBC 0.0 0  WBC    ABSOLUTE NRBC 0.00 0.00 - 0.01 K/uL    NEUTROPHILS 90 (H) 32 - 75 %    LYMPHOCYTES 7 (L) 12 - 49 %    MONOCYTES 3 (L) 5 - 13 %    EOSINOPHILS 0 0 - 7 %    BASOPHILS 0 0 - 1 %    IMMATURE GRANULOCYTES 0 %    ABS. NEUTROPHILS 15.7 (H) 1.8 - 8.0 K/UL    ABS. LYMPHOCYTES 1.2 0.8 - 3.5 K/UL    ABS. MONOCYTES 0.5 0.0 - 1.0 K/UL    ABS. EOSINOPHILS 0.0 0.0 - 0.4 K/UL    ABS. BASOPHILS 0.0 0.0 - 0.1 K/UL    ABS. IMM.  GRANS. 0.0 K/UL    DF MANUAL      RBC COMMENTS ANISOCYTOSIS  1+       CBC WITH AUTOMATED DIFF    Collection Time: 12/21/19  6:01 AM   Result Value Ref Range    WBC 14.2 (H) 3.6 - 11.0 K/uL    RBC 2.52 (L) 3.80 - 5.20 M/uL    HGB 7.7 (L) 11.5 - 16.0 g/dL    HCT 24.1 (L) 35.0 - 47.0 %    MCV 95.6 80.0 - 99.0 FL    MCH 30.6 26.0 - 34.0 PG    MCHC 32.0 30.0 - 36.5 g/dL    RDW 14.7 (H) 11.5 - 14.5 %    PLATELET 039 867 - 400 K/uL    MPV 10.7 8.9 - 12.9 FL    NRBC 0.0 0  WBC    ABSOLUTE NRBC 0.00 0.00 - 0.01 K/uL    NEUTROPHILS 78 (H) 32 - 75 %    LYMPHOCYTES 16 12 - 49 %    MONOCYTES 5 5 - 13 %    EOSINOPHILS 0 0 - 7 %    BASOPHILS 0 0 - 1 %    IMMATURE GRANULOCYTES 1 (H) 0.0 - 0.5 %    ABS. NEUTROPHILS 11.2 (H) 1.8 - 8.0 K/UL    ABS. LYMPHOCYTES 2.2 0.8 - 3.5 K/UL    ABS. MONOCYTES 0.6 0.0 - 1.0 K/UL    ABS. EOSINOPHILS 0.1 0.0 - 0.4 K/UL    ABS. BASOPHILS 0.0 0.0 - 0.1 K/UL    ABS. IMM.  GRANS. 0.1 (H) 0.00 - 0.04 K/UL    DF AUTOMATED

## 2019-12-21 NOTE — LACTATION NOTE
This note was copied from a baby's chart. Infant seen at breast, deep latch noted with rhythmic sucking and swallowing heard. Mom encouraged to offer both breasts at each feeding. Infant positioned in the cradle position. Other positions discussed with tips for pillow placement.

## 2019-12-21 NOTE — ROUTINE PROCESS
1800:  Patient verbalizes feeling lightheaded and nausea with sitting up in bed. Stayed bedside until resolved. Cold compress placed on nape of neck and head. MD notified. Orders received.

## 2019-12-21 NOTE — PROGRESS NOTES
Bedside shift change report given to MOHINDER Cline, RN (oncoming nurse) by WILL Zuniga RN (offgoing nurse). Report included the following information SBAR and MAR.       0430  Tapia removed, pt eating and drinking well and increased output. Nausea and lightheadiness resolved.

## 2019-12-22 LAB
ERYTHROCYTE [DISTWIDTH] IN BLOOD BY AUTOMATED COUNT: 14.6 % (ref 11.5–14.5)
HCT VFR BLD AUTO: 25.2 % (ref 35–47)
HGB BLD-MCNC: 8 G/DL (ref 11.5–16)
MCH RBC QN AUTO: 30.5 PG (ref 26–34)
MCHC RBC AUTO-ENTMCNC: 31.7 G/DL (ref 30–36.5)
MCV RBC AUTO: 96.2 FL (ref 80–99)
NRBC # BLD: 0 K/UL (ref 0–0.01)
NRBC BLD-RTO: 0 PER 100 WBC
PLATELET # BLD AUTO: 167 K/UL (ref 150–400)
PMV BLD AUTO: 10.5 FL (ref 8.9–12.9)
RBC # BLD AUTO: 2.62 M/UL (ref 3.8–5.2)
WBC # BLD AUTO: 13.3 K/UL (ref 3.6–11)

## 2019-12-22 PROCEDURE — 36415 COLL VENOUS BLD VENIPUNCTURE: CPT

## 2019-12-22 PROCEDURE — 85027 COMPLETE CBC AUTOMATED: CPT

## 2019-12-22 PROCEDURE — 74011250637 HC RX REV CODE- 250/637: Performed by: OBSTETRICS & GYNECOLOGY

## 2019-12-22 PROCEDURE — 65410000002 HC RM PRIVATE OB

## 2019-12-22 RX ORDER — OXYCODONE AND ACETAMINOPHEN 5; 325 MG/1; MG/1
1 TABLET ORAL
Qty: 20 TAB | Refills: 0 | Status: SHIPPED | OUTPATIENT
Start: 2019-12-22 | End: 2019-12-29

## 2019-12-22 RX ORDER — IBUPROFEN 600 MG/1
600 TABLET ORAL
Qty: 40 TAB | Refills: 2 | Status: SHIPPED | OUTPATIENT
Start: 2019-12-22 | End: 2020-07-24

## 2019-12-22 RX ORDER — DOCUSATE SODIUM 100 MG/1
100 CAPSULE, LIQUID FILLED ORAL
Qty: 60 CAP | Refills: 0 | Status: SHIPPED | OUTPATIENT
Start: 2019-12-22 | End: 2020-07-24

## 2019-12-22 RX ADMIN — IBUPROFEN 800 MG: 400 TABLET ORAL at 08:18

## 2019-12-22 RX ADMIN — IBUPROFEN 800 MG: 400 TABLET ORAL at 16:43

## 2019-12-22 RX ADMIN — ACETAMINOPHEN 650 MG: 325 TABLET ORAL at 17:20

## 2019-12-22 RX ADMIN — ACETAMINOPHEN 650 MG: 325 TABLET ORAL at 02:27

## 2019-12-22 RX ADMIN — ACETAMINOPHEN 650 MG: 325 TABLET ORAL at 13:02

## 2019-12-22 RX ADMIN — ACETAMINOPHEN 650 MG: 325 TABLET ORAL at 07:04

## 2019-12-22 RX ADMIN — ACETAMINOPHEN 650 MG: 325 TABLET ORAL at 22:48

## 2019-12-22 NOTE — DISCHARGE INSTRUCTIONS
Patient Education         Section: What to Expect at Home  Your Recovery    A  section, or , is surgery to deliver your baby through a cut, called an incision, that the doctor makes in your lower belly and uterus. You may have some pain in your lower belly and need pain medicine for 1 to 2 weeks. You can expect some vaginal bleeding for several weeks. You will probably need about 6 weeks to fully recover. It is important to take it easy while the incision is healing. Avoid heavy lifting, strenuous activities, or exercises that strain the belly muscles while you are recovering. Ask a family member or friend for help with housework, cooking, and shopping. This care sheet gives you a general idea about how long it will take for you to recover. But each person recovers at a different pace. Follow the steps below to get better as quickly as possible. How can you care for yourself at home? Activity    · Rest when you feel tired. Getting enough sleep will help you recover.     · Try to walk each day. Start by walking a little more than you did the day before. Bit by bit, increase the amount you walk. Walking boosts blood flow and helps prevent pneumonia, constipation, and blood clots.     · Avoid strenuous activities, such as bicycle riding, jogging, weightlifting, and aerobic exercise, for 6 weeks or until your doctor says it is okay.     · Until your doctor says it is okay, do not lift anything heavier than your baby.     · Do not do sit-ups or other exercises that strain the belly muscles for 6 weeks or until your doctor says it is okay.     · Hold a pillow over your incision when you cough or take deep breaths. This will support your belly and decrease your pain.     · You may shower as usual. Pat the incision dry when you are done.     · You will have some vaginal bleeding. Wear sanitary pads.  Do not douche or use tampons until your doctor says it is okay.     · Ask your doctor when you can drive again.     · You will probably need to take at least 6 weeks off work. It depends on the type of work you do and how you feel.     · Ask your doctor when it is okay for you to have sex. Diet    · You can eat your normal diet. If your stomach is upset, try bland, low-fat foods like plain rice, broiled chicken, toast, and yogurt.     · Drink plenty of fluids (unless your doctor tells you not to).     · You may notice that your bowel movements are not regular right after your surgery. This is common. Try to avoid constipation and straining with bowel movements. You may want to take a fiber supplement every day. If you have not had a bowel movement after a couple of days, ask your doctor about taking a mild laxative.     · If you are breastfeeding, limit alcohol. Alcohol can cause a lack of energy and other health problems for the baby when a breastfeeding woman drinks heavily. It can also get in the way of a mom's ability to feed her baby or to care for the child in other ways. There isn't a lot of research about exactly how much alcohol can harm a baby. Having no alcohol is the safest choice for your baby. If you choose to have a drink now and then, have only one drink, and limit the number of occasions that you have a drink. Wait to breastfeed at least 2 hours after you have a drink to reduce the amount of alcohol the baby may get in the milk. Medicines    · Your doctor will tell you if and when you can restart your medicines. He or she will also give you instructions about taking any new medicines.     · If you take blood thinners, such as warfarin (Coumadin), clopidogrel (Plavix), or aspirin, be sure to talk to your doctor. He or she will tell you if and when to start taking those medicines again. Make sure that you understand exactly what your doctor wants you to do.     · Take pain medicines exactly as directed. ? If the doctor gave you a prescription medicine for pain, take it as prescribed. ?  If you are not taking a prescription pain medicine, ask your doctor if you can take an over-the-counter medicine.     · If you think your pain medicine is making you sick to your stomach:  ? Take your medicine after meals (unless your doctor has told you not to). ? Ask your doctor for a different pain medicine.     · If your doctor prescribed antibiotics, take them as directed. Do not stop taking them just because you feel better. You need to take the full course of antibiotics. Incision care    · If you have strips of tape on the incision, leave the tape on for a week or until it falls off.     · Wash the area daily with warm, soapy water, and pat it dry. Don't use hydrogen peroxide or alcohol, which can slow healing. You may cover the area with a gauze bandage if it weeps or rubs against clothing. Change the bandage every day.     · Keep the area clean and dry. Other instructions    · If you breastfeed your baby, you may be more comfortable while you are healing if you place the baby so that he or she is not resting on your belly. Try tucking your baby under your arm, with his or her body along the side you will be feeding on. Support your baby's upper body with your arm. With that hand you can control your baby's head to bring his or her mouth to your breast. This is sometimes called the football hold. Follow-up care is a key part of your treatment and safety. Be sure to make and go to all appointments, and call your doctor if you are having problems. It's also a good idea to know your test results and keep a list of the medicines you take. When should you call for help? Call 911 anytime you think you may need emergency care.  For example, call if:    · You have thoughts of harming yourself, your baby, or another person.     · You passed out (lost consciousness).     · You have chest pain, are short of breath, or cough up blood.     · You have a seizure.    Call your doctor now or seek immediate medical care if:    · You have pain that does not get better after you take pain medicine.     · You have severe vaginal bleeding.     · You are dizzy or lightheaded, or you feel like you may faint.     · You have new or worse pain in your belly or pelvis.     · You have loose stitches, or your incision comes open.     · You have symptoms of infection, such as:  ? Increased pain, swelling, warmth, or redness. ? Red streaks leading from the incision. ? Pus draining from the incision. ? A fever.     · You have symptoms of a blood clot in your leg (called a deep vein thrombosis), such as:  ? Pain in your calf, back of the knee, thigh, or groin. ? Redness and swelling in your leg or groin.     · You have signs of preeclampsia, such as:  ? Sudden swelling of your face, hands, or feet. ? New vision problems (such as dimness, blurring, or seeing spots). ? A severe headache.    Watch closely for changes in your health, and be sure to contact your doctor if:    · You do not get better as expected. Where can you learn more? Go to http://maria alejandra-justin.info/. Enter M806 in the search box to learn more about \" Section: What to Expect at Home. \"  Current as of: May 29, 2019  Content Version: 12.2  © 0641-4612 [employer]. Care instructions adapted under license by [employer] (which disclaims liability or warranty for this information). If you have questions about a medical condition or this instruction, always ask your healthcare professional. [place] [address] any warranty or liability for your use of this information.

## 2019-12-22 NOTE — LACTATION NOTE
This note was copied from a baby's chart. Mom has been experiencing significant nipple discomfort with nursing. Nipples are intact, but reddened. Infant has mildly limited tongue extension, but is able to cup my finger sufficiently with her tongue. I provided mom with a shield and she was able to feed infant on both breasts with minimal discomfort. Infant could be heard swallowing. Following nursing, I instructed mom how to manually express colostrum and feed it to the infant. Due to using the shield, I have suggested that she manually express following feeds to facilitate lactogenesis II.

## 2019-12-22 NOTE — ROUTINE PROCESS
Bedside shift change report given to Silviano Prieto RNC (oncoming nurse) by Marjan Lindsay RN (offgoing nurse). Report included the following information SBAR. Aquacel dressing removed per Dr. Author Parks. Pt educated on importance of airing the incision out and looking for signs of infection.

## 2019-12-22 NOTE — ROUTINE PROCESS
Bedside shift change report given to Codey Rizvi RN (oncoming nurse) by Olga Watson RN (offgoing nurse). Report included the following information SBAR.

## 2019-12-22 NOTE — PROGRESS NOTES
Post-Operative  Day 2    Karishma Pickard     Assessment: Post-Op day 2, doing well    Plan:   1. Routine post-operative care   -remove bandage today to prevent moisture build-up (midline defect in adhesive)  2. Acute blood loss anemia: hemoglobin stable, tachycardia now resolved. Patient is asymptomatic. Plan supplemental iron at discharge. 3. Plan discharge home tomorrow. Information for the patient's :  Zeenat Duran [[id number]]   , Low Transverse     Subjective:  Patient doing well without significant complaint-she's feeling much better today. Nausea and vomiting resolved, tolerating liquids, passing flatus, voiding and ambulating without difficulty. Vitals:  Visit Vitals  /65 (BP 1 Location: Left arm, BP Patient Position: At rest)   Pulse 94   Temp 98 °F (36.7 °C)   Resp 16   Ht 5' 4\" (1.626 m)   Wt 108 kg (238 lb)   LMP 2019   SpO2 100%   Breastfeeding Unknown   BMI 40.85 kg/m²     Temp (24hrs), Av.1 °F (36.7 °C), Min:97.7 °F (36.5 °C), Max:98.3 °F (36.8 °C)        Exam:      Patient without distress. Abdomen, bowel sounds present, soft, expected tenderness, fundus firm                Bandage dry and intact.   Midline portion has a defect in the bandage                Lower extremities: edema improved, raymond hose on, no calf tenderness    Labs:   Lab Results   Component Value Date/Time    WBC 13.3 (H) 2019 02:58 AM    WBC 14.2 (H) 2019 06:01 AM    WBC 17.4 (H) 2019 07:09 PM    WBC 11.4 (H) 2019 10:01 AM    WBC 8.0 2019 03:10 PM    WBC 12.2 (H) 10/07/2017 12:45 AM    HGB 8.0 (L) 2019 02:58 AM    HGB 7.7 (L) 2019 06:01 AM    HGB 8.6 (L) 2019 07:09 PM    HGB 12.7 2019 10:01 AM    HGB 12.7 2019 03:10 PM    HGB 11.4 (L) 10/07/2017 12:45 AM    HCT 25.2 (L) 2019 02:58 AM    HCT 24.1 (L) 2019 06:01 AM    HCT 27.5 (L) 2019 07:09 PM    HCT 39.7 2019 10:01 AM    HCT 37.3 01/11/2019 03:10 PM    HCT 35.2 10/07/2017 12:45 AM    PLATELET 497 40/33/9061 02:58 AM    PLATELET 124 69/47/2800 06:01 AM    PLATELET 598 19/08/0749 07:09 PM    PLATELET 833 18/03/2879 10:01 AM    PLATELET 908 16/70/6273 03:10 PM    PLATELET 964 00/43/0872 12:45 AM       Recent Results (from the past 24 hour(s))   CBC W/O DIFF    Collection Time: 12/22/19  2:58 AM   Result Value Ref Range    WBC 13.3 (H) 3.6 - 11.0 K/uL    RBC 2.62 (L) 3.80 - 5.20 M/uL    HGB 8.0 (L) 11.5 - 16.0 g/dL    HCT 25.2 (L) 35.0 - 47.0 %    MCV 96.2 80.0 - 99.0 FL    MCH 30.5 26.0 - 34.0 PG    MCHC 31.7 30.0 - 36.5 g/dL    RDW 14.6 (H) 11.5 - 14.5 %    PLATELET 855 375 - 456 K/uL    MPV 10.5 8.9 - 12.9 FL    NRBC 0.0 0  WBC    ABSOLUTE NRBC 0.00 0.00 - 0.01 K/uL

## 2019-12-22 NOTE — DISCHARGE SUMMARY
Obstetrical Discharge Summary     Name: Lisa Ames MRN: [id number]  SSN: [id number]    YOB: 1980  Age: 44 y.o. Sex: female      Admit Date: 2019    Discharge Date: 2019     Admitting Physician: Valeri Stokes MD     Attending Physician:  Marjan Olguin MD     Admission Diagnoses: Premature rupture of membranes [O42.90]    Discharge Diagnoses:   Information for the patient's :  Aura Panchal [[id number]]   Delivery of a 3.65 kg female infant via , Low Transverse on 2019 at 4:26 AM  by Mirta Marie. Apgars were 9  and 9 . Acute blood loss anemia    Additional Diagnoses:   Hospital Problems  Date Reviewed: 2019          Codes Class Noted POA    Premature rupture of membranes ICD-10-CM: O42.90  ICD-9-CM: 658.10  2019 Unknown             Lab Results   Component Value Date/Time    Rubella, External Immune 2019    GrBStrep, External Negative 2019       Hospital Course: Normal hospital course following the delivery. Disposition at Discharge: Home or self care    Discharged Condition: Stable    Patient Instructions:   Current Discharge Medication List      START taking these medications    Details   oxyCODONE-acetaminophen (PERCOCET) 5-325 mg per tablet Take 1 Tab by mouth every four (4) hours as needed for Pain for up to 7 days. Max Daily Amount: 6 Tabs. Qty: 20 Tab, Refills: 0    Associated Diagnoses: Postoperative pain      ibuprofen (MOTRIN) 600 mg tablet Take 1 Tab by mouth every six (6) hours as needed for Pain. Qty: 40 Tab, Refills: 2      ferrous sulfate (SLOW FE) 142 mg (45 mg iron) ER tablet Take 1 Tab by mouth Daily (before breakfast). Qty: 60 Tab, Refills: 0      docusate sodium (COLACE) 100 mg capsule Take 1 Cap by mouth two (2) times daily as needed for Constipation.   Qty: 60 Cap, Refills: 0         CONTINUE these medications which have NOT CHANGED    Details   nystatin (MYCOSTATIN) powder Apply  to affected area four (4) times daily. multivitamin (ONE A DAY) tablet multivitamin             Reference my discharge instructions.     Follow-up Appointments   Procedures    FOLLOW UP VISIT Appointment in: 6 Weeks     Standing Status:   Standing     Number of Occurrences:   1     Order Specific Question:   Appointment in     Answer:   6 Weeks        Signed By:  Reji Tijerina MD     December 22, 2019

## 2019-12-23 VITALS
BODY MASS INDEX: 40.63 KG/M2 | SYSTOLIC BLOOD PRESSURE: 99 MMHG | OXYGEN SATURATION: 98 % | HEART RATE: 86 BPM | DIASTOLIC BLOOD PRESSURE: 58 MMHG | HEIGHT: 64 IN | TEMPERATURE: 97.9 F | RESPIRATION RATE: 16 BRPM | WEIGHT: 238 LBS

## 2019-12-23 PROCEDURE — 74011250637 HC RX REV CODE- 250/637: Performed by: OBSTETRICS & GYNECOLOGY

## 2019-12-23 RX ADMIN — MUPIROCIN: 20 OINTMENT TOPICAL at 11:32

## 2019-12-23 RX ADMIN — ACETAMINOPHEN 650 MG: 325 TABLET ORAL at 09:45

## 2019-12-23 RX ADMIN — ACETAMINOPHEN 650 MG: 325 TABLET ORAL at 05:25

## 2019-12-23 RX ADMIN — IBUPROFEN 800 MG: 400 TABLET ORAL at 09:45

## 2019-12-23 RX ADMIN — IBUPROFEN 800 MG: 400 TABLET ORAL at 01:42

## 2019-12-23 NOTE — ROUTINE PROCESS
Bedside shift change report given to King Parks RN (oncoming nurse) by Dell Luong RN (offgoing nurse). Report included the following information SBAR.

## 2019-12-23 NOTE — LACTATION NOTE
This note was copied from a baby's chart. Mom's milk is coming in. Per mom: Baby nursing well and has improved throughout post partum stay, deep latch maintained, mother is comfortable, milk is in transition, baby feeding vigorously with rhythmic suck, swallow, breathe pattern, with audible swallowing, and evident milk transfer, both breasts offerd, baby is asleep following feeding. Baby is feeding on demand, voiding and stools present as appropriate over the last 24 hours. Mom is using hand expression or hand pump to soften areola prior to feed if needed and to express if infant only feeds on one side. She then gave EBM via syringe. Parents encouraged to feed infant on demand; at least 8-10 times/day. Reviewed: nutrition, milk storage, positioning, and nipple care. Mom has sore, superficial cracks on nipples. Reviewed positioning and characteristics shallow v deep latch with family and using pillows to support nursing couplet. Dr. Sunita Muniz notified and [address] ordered. Breasts may become engorged when milk \"comes in\". How milk is made / normal phases of milk production, supply and demand discussed. Taught care of engorged breasts - frequent breastfeeding encouraged, warm compresses and breast massage ac. Then nurse the baby or pump. Apply cold compresses pc x 15 minutes a few times a day for swelling or discomfort. May need to do this care for a couple of days. Discussed prevention and treatment of mastitis. Breastfeeding Support Group  [facility] Group meets the 1st and 3rd Tuesday of each month in the [facility] Department from 10:00-11:00, (located behind [employer] on the first floor) Meetings are facilitated by board certified lactation consultants and all breastfeeding moms and their infants are invited. All lactation teaching completed and questions answered. Family verbalizing confidence with feeding and decline additional questions.

## 2019-12-23 NOTE — PROGRESS NOTES
Post-Operative  Day 3    Alana Cobabe       Assessment: Post-Op day 3, doing well  Acute blood loss anemia, hgb 12.7->8.0, asymptomatic    Plan:   1. Discharge home today  2. Follow up in office in 6 weeks with Osmin Adrian MD  3. Post partum activity/wound care advised, diet as tolerated  4. Discharge Medications: ibuprofen, Fe and medications prior to admission      Information for the patient's :  Love Chen [[id number]]   , Low Transverse   Patient doing well without significant complaint. Tolerating diet, passing flatus, voiding and ambulating without difficulty    Vitals:  Visit Vitals  /63 (BP 1 Location: Left arm, BP Patient Position: At rest)   Pulse 87   Temp 98 °F (36.7 °C)   Resp 16   Ht 5' 4\" (1.626 m)   Wt 108 kg (238 lb)   LMP 2019   SpO2 98%   Breastfeeding Unknown   BMI 40.85 kg/m²     Temp (24hrs), Av.9 °F (36.6 °C), Min:97.7 °F (36.5 °C), Max:98 °F (36.7 °C)        Exam:        Patient without distress. Abdomen, bowel sounds present, soft, expected tenderness, fundus firm                Wound incision clean, dry and intact               Lower extremities are negative for swelling, cords or tenderness.     Labs:   Lab Results   Component Value Date/Time    WBC 13.3 (H) 2019 02:58 AM    WBC 14.2 (H) 2019 06:01 AM    WBC 17.4 (H) 2019 07:09 PM    WBC 11.4 (H) 2019 10:01 AM    WBC 8.0 2019 03:10 PM    WBC 12.2 (H) 10/07/2017 12:45 AM    HGB 8.0 (L) 2019 02:58 AM    HGB 7.7 (L) 2019 06:01 AM    HGB 8.6 (L) 2019 07:09 PM    HGB 12.7 2019 10:01 AM    HGB 12.7 2019 03:10 PM    HGB 11.4 (L) 10/07/2017 12:45 AM    HCT 25.2 (L) 2019 02:58 AM    HCT 24.1 (L) 2019 06:01 AM    HCT 27.5 (L) 2019 07:09 PM    HCT 39.7 2019 10:01 AM    HCT 37.3 2019 03:10 PM    HCT 35.2 10/07/2017 12:45 AM    PLATELET 068  02:58 AM    PLATELET 402 12/21/2019 06:01 AM    PLATELET 954 54/79/3917 07:09 PM    PLATELET 657 81/29/8008 10:01 AM    PLATELET 536 71/27/0773 03:10 PM    PLATELET 070 73/46/2827 12:45 AM       No results found for this or any previous visit (from the past 24 hour(s)).

## 2019-12-23 NOTE — ROUTINE PROCESS
Bedside shift change report given to FARHANA Patel (oncoming nurse) by Imani Aguila RN (offgoing nurse). Report included the following information SBAR.  
 
1200-Pt discharged home with family. Discharge instructions and medication times reviewed. Pt verbalized understanding.

## 2020-01-08 ENCOUNTER — HOSPITAL ENCOUNTER (OUTPATIENT)
Dept: VASCULAR SURGERY | Age: 40
Discharge: HOME | End: 2020-01-08
Payer: COMMERCIAL

## 2020-01-08 DIAGNOSIS — R60.0 LOCALIZED EDEMA: ICD-10-CM

## 2020-01-08 PROCEDURE — 93971 EXTREMITY STUDY: CPT

## 2020-07-23 NOTE — PROGRESS NOTES
Wilfred Jorge [facility]  [place], [address]  [phone]             Date of visit: 2020   Subjective:      History obtained from:  the patient. Fernando Blas is a 44 y.o. female who presents today for   Chief Complaint   Patient presents with    Back Pain     This service was provided through telehealth (doxy. me real-time video/audio) due to COVID-19 pandemic, with the patient being at home and the provider being at [facility] in [place]. Others assisting in the telehealth encounter included none. 25 minutes were spent with the patient by the provider. she  and/or her healthcare decision maker is aware that this patient-initiated Telehealth encounter is a billable service, with coverage as determined by her insurance carrier. she  is aware that she  may receive a bill and has provided verbal consent to proceed: Yes, per PSR. Had a baby girl in December and has 3and 16year old  Is back at work.   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    Had a  in Dec, this was first time  Having groin pain for a month to the right of the incision and just below it  When she coughs feels something move  Lifting her 3year old and her baby all the time  Not constant but feels it daily  When she was working seated this past Wed it really bothered her with constant pain and made her whole right leg ache (mainly inner thigh and down to knee, achy feeling)  Doesn't bother her when up walking around  Worse with coughing with pain and pop  Can't feel a bulge    Had seen her last April about back pain and was going to do xrays when she knew she wasn't pregnant, so never got them  Was having pain in hip on right side, went away while pregnant but is coming back  Hurts worse when laying on right side  No numbness or tingling down leg or loss of bowel/bladder control    Having periods again and thinks they are normal  Finished last cycle in early July  For a week afterward had some brown discharge after wiping  Not breast feeding at all    Doing a low carb diet  Not back to pre-pregnancy weight yet  Not getting much exercise    Pap last year when pregnant, normal    Ever since spring started having breathing issues  Hx of exercise induced asthma  Has used albuterol inhaler occasionally in the past  Didn't like that it made her heart beat hard  Every morning when she wakes up is clearing yellow mucus, coughing, feels tight, nose stopped up  [place] in [place] and wondering if mold allergies  Feels short of breath in the mornings about 2x per week when she is trying to clear the mucus from her lungs  Has never taken allergy medicine, doesn't like to take meds    Her BP was a little high after delivery 140s/90s and had lasix that helped her to breathe, not having that symptom now  Thinks fluid needed to come off, legs were swollen  This was after lots of IV fluids for low bp during   bp running 130/80s at times    Tends to get PMS but wanted natural solution  In the past lexapro and zoloft plus wellbutrin worked but both made her gain weight    Patient Active Problem List    Diagnosis Date Noted    Premature rupture of membranes 2019    Advanced maternal age in multigravida, third trimester 2019    38 weeks gestation of pregnancy 2019    Vaginal discharge during pregnancy in third trimester 2019    Uterine size date discrepancy pregnancy, third trimester 2019    Unstable lie of fetus 2019    PMDD (premenstrual dysphoric disorder) 2019    Severe obesity ([address] 2019    Eczema 2019    History of OCD (obsessive compulsive disorder) 2019    History of panic attacks 2019     Current Outpatient Medications   Medication Sig Dispense Refill    albuterol (PROVENTIL HFA, VENTOLIN HFA, PROAIR HFA) 90 mcg/actuation inhaler Take 2 Puffs by inhalation every four (4) hours as needed for Wheezing or Shortness of Breath. Ok to sub any brand 1 Inhaler 3     Allergies   Allergen Reactions    Macrobid [Nitrofurantoin Monohyd/M-Cryst] Other (comments)     Throat closed up    Pristiq [Desvenlafaxine Succinate] Other (comments)     Made her more depressed     Past Medical History:   Diagnosis Date    Asthma     with illness; last needed inhaler > 1 year ago    Generalized anxiety disorder     last took medication 3 years ago    History of palpitations in adulthood     saw Dr. Ameya Catalan 2017, echo normal, sinus tach on holter monitor , murmur noted on exam    OCD (obsessive compulsive disorder)     Panic disorder     Postpartum depression     with 1st pregnancy     Past Surgical History:   Procedure Laterality Date    HX  SECTION  2019     Family History   Problem Relation Age of Onset    Diabetes Mother     High Cholesterol Mother     Diabetes Father     Cancer Father         of the throat    Heart Disease Maternal Grandfather     Parkinson's Disease Paternal Grandmother     Hypertension Paternal Grandmother     Liver Disease Paternal Grandfather         Primary sclerosis cholangitis     Social History     Tobacco Use    Smoking status: Former Smoker    Smokeless tobacco: Never Used    Tobacco comment: Quit 19 years ago smoked for 5 years   Substance Use Topics    Alcohol use: No      Social History     Social History Narrative    Lives with     Has 3 kids    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 There is no height or weight on file to calculate BMI.      General: stated age, well developed, well nourished and in NAD  Skin:  No lesions noted on video  Psych: alert and oriented to person, place, time and situation and Speech: appropriate quality, quantity and organization of sentences     Assessment/Plan:       ICD-10-CM ICD-9-CM    1. Right groin pain  R10.31 789.03 US ABD LTD   2. Chronic right-sided low back pain without sciatica  M54.5 724.2 XR SPINE LUMB 2 OR 3 V    G89.29 338.29    3. Right hip pain  M25.551 719.45 XR HIP RT W OR WO PELV 2-3 VWS   4. PMDD (premenstrual dysphoric disorder)  F32.81 625.4    5. Severe obesity ([address]  E66.01 278.01    6. Cough  R05 786.2    7. Allergic rhinitis, unspecified seasonality, unspecified trigger  J30.9 477.9         Orders Placed This Encounter    XR SPINE LUMB 2 OR 3 V    XR HIP RT W OR WO PELV 2-3 VWS    US ABD LTD    albuterol (PROVENTIL HFA, VENTOLIN HFA, PROAIR HFA) 90 mcg/actuation inhaler     Sounds like she might have a hernia  Will do ultrasound and consider referral to a general surgeon  Could possibly be scar tissue irritated and moving, related to  7 mo ago  Also has long history of right low back and right hip pain; may be related? Will xray those joints as we had planned before, now that she is not pregnant. The cough/congestion for several months do sound like allergies  Discussed otc allergy meds, she will try one and update me in 1 week on portal  If not improving may need CXR and PFTs  Discussed if some better but not all the way will add singulair  Also refill albuterol for occasional use as she has sob some mornings and history of exercise induced asthma    Discussed the diagnosis and plan and she expressed understanding. Follow-up and Dispositions    · Return if symptoms worsen or fail to improve.          Lynette Long MD

## 2020-07-23 NOTE — PATIENT INSTRUCTIONS
To schedule an outpatient imaging test, please call:  [phone] [facility] scheduling)    Learning About Relief for Back Pain  What is back strain? Back strain is an injury that happens when you overstretch, or pull, a muscle in your back. You may hurt your back in an accident or when you exercise or lift something. Most back pain gets better with rest and time. You can take care of yourself at home to help your back heal.  What can you do first to relieve back pain? When you first feel back pain, try these steps:  · Walk. Take a short walk (10 to 20 minutes) on a level surface (no slopes, hills, or stairs) every 2 to 3 hours. Walk only distances you can manage without pain, especially leg pain. · Relax. Find a comfortable position for rest. Some people are comfortable on the floor or a medium-firm bed with a small pillow under their head and another under their knees. Some people prefer to lie on their side with a pillow between their knees. Don't stay in one position for too long. · Try heat or ice. Try using a heating pad on a low or medium setting, or take a warm shower, for 15 to 20 minutes every 2 to 3 hours. Or you can buy single-use heat wraps that last up to 8 hours. You can also try an ice pack for 10 to 15 minutes every 2 to 3 hours. You can use an ice pack or a bag of frozen vegetables wrapped in a thin towel. There is not strong evidence that either heat or ice will help, but you can try them to see if they help. You may also want to try switching between heat and cold. · Take pain medicine exactly as directed. ? If the doctor gave you a prescription medicine for pain, take it as prescribed. ? If you are not taking a prescription pain medicine, ask your doctor if you can take an over-the-counter medicine. What else can you do? · Stretch and exercise. Exercises that increase flexibility may relieve your pain and make it easier for your muscles to keep your spine in a good, neutral position. And don't forget to keep walking. · Do self-massage. You can use self-massage to unwind after work or school or to energize yourself in the morning. You can easily massage your feet, hands, or neck. Self-massage works best if you are in comfortable clothes and are sitting or lying in a comfortable position. Use oil or lotion to massage bare skin. · Reduce stress. Back pain can lead to a vicious [place]: Distress about the pain tenses the muscles in your back, which in turn causes more pain. Learn how to relax your mind and your muscles to lower your stress. Where can you learn more? Go to http://www.gray.com/  Enter Q517 in the search box to learn more about \"Learning About Relief for Back Pain. \"  Current as of: March 2, 2020               Content Version: 12.5  © 5790-0271 Healthwise, Incorporated. Care instructions adapted under license by [employer] (which disclaims liability or warranty for this information). If you have questions about a medical condition or this instruction, always ask your healthcare professional. [address] any warranty or liability for your use of this information.

## 2020-07-24 ENCOUNTER — VIRTUAL VISIT (OUTPATIENT)
Dept: FAMILY MEDICINE CLINIC | Age: 40
End: 2020-07-24

## 2020-07-24 DIAGNOSIS — R05.9 COUGH: ICD-10-CM

## 2020-07-24 DIAGNOSIS — M25.551 RIGHT HIP PAIN: ICD-10-CM

## 2020-07-24 DIAGNOSIS — J30.9 ALLERGIC RHINITIS, UNSPECIFIED SEASONALITY, UNSPECIFIED TRIGGER: ICD-10-CM

## 2020-07-24 DIAGNOSIS — E66.01 SEVERE OBESITY (HCC): ICD-10-CM

## 2020-07-24 DIAGNOSIS — R10.31 RIGHT GROIN PAIN: Primary | ICD-10-CM

## 2020-07-24 DIAGNOSIS — M54.50 CHRONIC RIGHT-SIDED LOW BACK PAIN WITHOUT SCIATICA: ICD-10-CM

## 2020-07-24 DIAGNOSIS — G89.29 CHRONIC RIGHT-SIDED LOW BACK PAIN WITHOUT SCIATICA: ICD-10-CM

## 2020-07-24 RX ORDER — ALBUTEROL SULFATE 90 UG/1
2 AEROSOL, METERED RESPIRATORY (INHALATION)
Qty: 1 INHALER | Refills: 3 | Status: SHIPPED | OUTPATIENT
Start: 2020-07-24 | End: 2022-10-11

## 2020-07-27 ENCOUNTER — HOSPITAL ENCOUNTER (OUTPATIENT)
Dept: ULTRASOUND IMAGING | Age: 40
Discharge: HOME | End: 2020-07-27
Attending: FAMILY MEDICINE
Payer: COMMERCIAL

## 2020-07-27 DIAGNOSIS — R10.31 RIGHT GROIN PAIN: ICD-10-CM

## 2020-07-27 PROCEDURE — 76705 ECHO EXAM OF ABDOMEN: CPT

## 2020-08-15 ENCOUNTER — NURSE TRIAGE (OUTPATIENT)
Dept: OTHER | Facility: CLINIC | Age: 40
End: 2020-08-15

## 2020-08-18 ENCOUNTER — TELEPHONE (OUTPATIENT)
Dept: FAMILY MEDICINE CLINIC | Age: 40
End: 2020-08-18

## 2020-08-18 ENCOUNTER — OFFICE VISIT (OUTPATIENT)
Dept: FAMILY MEDICINE CLINIC | Age: 40
End: 2020-08-18
Payer: COMMERCIAL

## 2020-08-18 ENCOUNTER — HOSPITAL ENCOUNTER (OUTPATIENT)
Dept: GENERAL RADIOLOGY | Age: 40
Discharge: HOME | End: 2020-08-18
Attending: FAMILY MEDICINE
Payer: COMMERCIAL

## 2020-08-18 VITALS
RESPIRATION RATE: 18 BRPM | HEART RATE: 93 BPM | OXYGEN SATURATION: 99 % | DIASTOLIC BLOOD PRESSURE: 85 MMHG | WEIGHT: 217.4 LBS | TEMPERATURE: 97.1 F | HEIGHT: 64 IN | BODY MASS INDEX: 37.11 KG/M2 | SYSTOLIC BLOOD PRESSURE: 130 MMHG

## 2020-08-18 DIAGNOSIS — R06.02 SHORTNESS OF BREATH: ICD-10-CM

## 2020-08-18 DIAGNOSIS — R06.02 SHORTNESS OF BREATH: Primary | ICD-10-CM

## 2020-08-18 DIAGNOSIS — R07.89 CHEST TIGHTNESS: ICD-10-CM

## 2020-08-18 DIAGNOSIS — M25.551 RIGHT HIP PAIN: ICD-10-CM

## 2020-08-18 DIAGNOSIS — R59.9 LYMPH NODE ENLARGEMENT: ICD-10-CM

## 2020-08-18 DIAGNOSIS — G89.29 CHRONIC RIGHT-SIDED LOW BACK PAIN WITHOUT SCIATICA: ICD-10-CM

## 2020-08-18 DIAGNOSIS — M54.50 CHRONIC RIGHT-SIDED LOW BACK PAIN WITHOUT SCIATICA: ICD-10-CM

## 2020-08-18 PROCEDURE — 72100 X-RAY EXAM L-S SPINE 2/3 VWS: CPT

## 2020-08-18 PROCEDURE — 71046 X-RAY EXAM CHEST 2 VIEWS: CPT

## 2020-08-18 PROCEDURE — 73502 X-RAY EXAM HIP UNI 2-3 VIEWS: CPT

## 2020-08-18 PROCEDURE — 99214 OFFICE O/P EST MOD 30 MIN: CPT | Performed by: FAMILY MEDICINE

## 2020-08-18 RX ORDER — FOLIC ACID/MULTIVIT,IRON,MINER 0.4MG-18MG
TABLET ORAL
COMMUNITY
End: 2022-10-11

## 2020-08-18 RX ORDER — CETIRIZINE HYDROCHLORIDE 5 MG/1
TABLET ORAL
COMMUNITY
End: 2022-10-11

## 2020-08-18 RX ORDER — MULTIVITAMIN
CAPSULE ORAL
COMMUNITY
End: 2022-10-11

## 2020-08-18 NOTE — TELEPHONE ENCOUNTER
----- Message from Meng Pop Page sent at 8/15/2020 10:07 AM EDT -----  Regarding: Dr. Antoinette Merlos Message/Vendor Calls    Caller's first and last name:      Reason for call:  Regarding in office appt, shortness of breath and swollen lymph noded    Callback required yes/no and why:  yes    Best contact number(s):  [phone]    Details to clarify the request:  Transferred to triage nurse    Divya Galeano

## 2020-08-18 NOTE — PROGRESS NOTES
Identified pt with two pt identifiers(name and ). Reviewed record in preparation for visit and have obtained necessary documentation. Chief Complaint   Patient presents with    Shortness of Breath     Pt c/o lymphnodes were inflammed for about 3-4 weeks, gallbladder pain-- Sx have been present off and on        Health Maintenance Due   Topic    Influenza Age 5 to Adult        Visit Vitals  /85 (BP 1 Location: Left arm, BP Patient Position: Sitting)   Pulse 93   Temp 97.1 °F (36.2 °C) (Oral)   Resp 18   Ht 5' 4\" (1.626 m)   Wt 217 lb 6.4 oz (98.6 kg)   SpO2 99%   BMI 37.32 kg/m²     Pt has a mole on her left side that she would like to take a look at. Coordination of Care Questionnaire:  :   1) Have you been to an emergency room, urgent care, or hospitalized since your last visit? If yes, where when, and reason for visit? no       2. Have seen or consulted any other health care provider since your last visit? If yes, where when, and reason for visit? NO      Patient is accompanied by self I have received verbal consent from Eleanor Vee to discuss any/all medical information while they are present in the room.

## 2020-08-18 NOTE — TELEPHONE ENCOUNTER
Attempted to call pt. Had to leave VM.  regarding how pt was feeling and to return call if she needed an appointment

## 2020-08-18 NOTE — PATIENT INSTRUCTIONS
For your symptoms: Your symptoms may improve with an oral antihistamine. These are available over the counter and include:  Loratadine/claritin  Cetirizine/Zyrtec  Fexofenadine/Allegra  Levocetirizine/Xyzal    · Your symptoms may improve with a nasal steroid. These are available over the counter and include:  · Flonase (aka fluticasone)  · Nasocort (aka triamcinolone)  · Nasonex (aka mometasone)  · Rhinocort (aka budesonide)    · Increase fluid intake, especially water to thin mucous and boost the immune system. · Avoid sugar and dairy while congested since they thicken mucous. · Get plenty of rest!    · Gargle 3 times daily and as needed in Listerine or warm salt water vinegar solutions (1 tsp salt, 1 tsp vinegar in 1 cup lukewarm water.)    · Use OTC nasal saline spray up each nostril four times daily. You could also consider using a netipot with distilled water. · Use humidifier at bedtime. · Use OTC Mucinex 600 mg twice daily to loosen mucous. · Use OTC Tylenol  (up to 650mg every 6 hours) or Ibuprofen (up to 800 mg every 8 hours) as needed for pain, fever or headaches. ·  Avoid decongestants and Ibuprofen if you have high blood pressure! Return to the doctor for evaluation:  · If mucous is consistently discolored yellow or green throughout the day for more than a week  · If you develop worsening facial pain  · If you develop a fever that will not go away  · If your symptoms worsen instead of improve         Saline Nasal Washes: Care Instructions  Your Care Instructions     Saline nasal washes help keep the nasal passages open by washing out thick or dried mucus. This simple remedy can help relieve symptoms of allergies, sinusitis, and colds. It also can make the nose feel more comfortable by keeping the mucous membranes moist. You may notice a little burning sensation in your nose the first few times you use the solution, but this usually gets better in a few days.   Follow-up care is a key part of your treatment and safety. Be sure to make and go to all appointments, and call your doctor if you are having problems. It's also a good idea to know your test results and keep a list of the medicines you take. How can you care for yourself at home? · You can buy premixed saline solution in a squeeze bottle or other sinus rinse products at a drugstore. Read and follow the instructions on the label. · You also can make your own saline solution by adding 1 teaspoon of salt and 1 teaspoon of baking soda to 2 cups of distilled water. · If you use a homemade solution, pour a small amount into a clean bowl. Using a rubber bulb syringe, squeeze the syringe and place the tip in the salt water. Pull a small amount of the salt water into the syringe by relaxing your hand. · Sit down with your head tilted slightly back. Do not lie down. Put the tip of the bulb syringe or the squeeze bottle a little way into one of your nostrils. Gently drip or squirt a few drops into the nostril. Repeat with the other nostril. Some sneezing and gagging are normal at first.  · Gently blow your nose. · Wipe the syringe or bottle tip clean after each use. · Repeat this 2 or 3 times a day. · Use nasal washes gently if you have nosebleeds often. When should you call for help? Watch closely for changes in your health, and be sure to contact your doctor if:  · You often get nosebleeds. · You have problems doing the nasal washes. Where can you learn more? Go to http://maria alejandra-justin.info/  Enter B784 in the search box to learn more about \"Saline Nasal Washes: Care Instructions. \"  Current as of: July 29, 2019               Content Version: 12.5  © 5617-1701 Healthwise, Incorporated. Care instructions adapted under license by [employer] (which disclaims liability or warranty for this information).  If you have questions about a medical condition or this instruction, always ask your healthcare professional. [address] any warranty or liability for your use of this information.

## 2020-08-19 LAB
BASOPHILS # BLD AUTO: 0 X10E3/UL (ref 0–0.2)
BASOPHILS NFR BLD AUTO: 0 %
EOSINOPHIL # BLD AUTO: 0.1 X10E3/UL (ref 0–0.4)
EOSINOPHIL NFR BLD AUTO: 1 %
ERYTHROCYTE [DISTWIDTH] IN BLOOD BY AUTOMATED COUNT: 12.4 % (ref 11.7–15.4)
HCT VFR BLD AUTO: 40.3 % (ref 34–46.6)
HGB BLD-MCNC: 13.8 G/DL (ref 11.1–15.9)
IMM GRANULOCYTES # BLD AUTO: 0 X10E3/UL (ref 0–0.1)
IMM GRANULOCYTES NFR BLD AUTO: 0 %
LYMPHOCYTES # BLD AUTO: 2.8 X10E3/UL (ref 0.7–3.1)
LYMPHOCYTES NFR BLD AUTO: 32 %
MCH RBC QN AUTO: 30.9 PG (ref 26.6–33)
MCHC RBC AUTO-ENTMCNC: 34.2 G/DL (ref 31.5–35.7)
MCV RBC AUTO: 90 FL (ref 79–97)
MONOCYTES # BLD AUTO: 0.6 X10E3/UL (ref 0.1–0.9)
MONOCYTES NFR BLD AUTO: 6 %
NEUTROPHILS # BLD AUTO: 5.2 X10E3/UL (ref 1.4–7)
NEUTROPHILS NFR BLD AUTO: 61 %
PLATELET # BLD AUTO: 258 X10E3/UL (ref 150–450)
RBC # BLD AUTO: 4.47 X10E6/UL (ref 3.77–5.28)
WBC # BLD AUTO: 8.7 X10E3/UL (ref 3.4–10.8)

## 2020-08-20 NOTE — PROGRESS NOTES
Your chest xray is normal. Please complete the lung function test to further evaluate the cause of your shortness of breath and chest discomfort.

## 2020-10-15 NOTE — PROGRESS NOTES
From: Jose Alfredo Lezama  To: Mustapha Giordano  Sent: 10/14/2020 9:44 PM CDT  Subject: Other    Hi Dr Hickman Boys,  Just wondering if I need to come see u again for my meds or not? :-) Iâm thinking I had my physical for the yr this fall before surgery so I donât need to? Ria and I need our flu shots yet. Thinking sheâll get hers at her well check with u. Wondering if I could have an appt then too to get a shot or ?   Thanks,  Nany Lu Ringgold [facility] Note      Subjective:     Chief Complaint   Patient presents with    Shortness of Breath     Pt c/o lymphnodes were inflammed for about 3-4 weeks, gallbladder pain-- Sx have been present off and on     Everton Danielle is a 44y.o. year old female who presents for evaluation of the following:      Shortness of Breath  Onset: 3/2020  Character: chest tightness  Location: central chest  Duration: 30 minutes  Frequency: several times daily  Shortness of breath intermittent daily lasting 30 minutes to an hour as well  Treatment: cetirizine, albuterol inhaler needed 2-3 times per day  - albuterol does not help the chest tightness  Endorses: seasonal allergies with cornic post nasal drip   Painful lymph nodes no longer painful  - right posteri neck lnode still palpapable but not tender  - has had gallbladder pain during this period. Denies injury, coughing, fever, vomiting, diarrhea      Review of Systems   Pertinent positives and negative per HPI. All other systems  reviewed are negative for a Comprehensive ROS (10+).        Past Medical History:   Diagnosis Date    Asthma     with illness; last needed inhaler > 1 year ago    Generalized anxiety disorder     last took medication 3 years ago    History of palpitations in adulthood 2015    saw Dr. Marquez Floor 6/2017, echo normal, sinus tach on holter monitor , murmur noted on exam    OCD (obsessive compulsive disorder)     Panic disorder     Postpartum depression     with 1st pregnancy        Social History     Socioeconomic History    Marital status:      Spouse name: Not on file    Number of children: Not on file    Years of education: Not on file    Highest education level: Not on file   Occupational History    Not on file   Social Needs    Financial resource strain: Not on file    Food insecurity     Worry: Not on file     Inability: Not on file    Transportation needs     Medical: Not on file Non-medical: Not on file   Tobacco Use    Smoking status: Former Smoker    Smokeless tobacco: Never Used    Tobacco comment: Quit 19 years ago smoked for 5 years   Substance and Sexual Activity    Alcohol use: No    Drug use: No    Sexual activity: Yes     Partners: Male     Birth control/protection: None   Lifestyle    Physical activity     Days per week: Not on file     Minutes per session: Not on file    Stress: Not on file   Relationships    Social connections     Talks on phone: Not on file     Gets together: Not on file     Attends Yazidism service: Not on file     Active member of club or organization: Not on file     Attends meetings of clubs or organizations: Not on file     Relationship status: Not on file    Intimate partner violence     Fear of current or ex partner: Not on file     Emotionally abused: Not on file     Physically abused: Not on file     Forced sexual activity: Not on file   Other Topics Concern     Service Not Asked    Blood Transfusions Not Asked    Caffeine Concern Not Asked    Occupational Exposure Not Asked   Rosalene Kyle Hazards Not Asked    Sleep Concern Not Asked    Stress Concern Not Asked    Weight Concern Not Asked    Special Diet Not Asked    Back Care Not Asked    Exercise Not Asked    Bike Helmet Not Asked   Bee Not Asked    Self-Exams Not Asked   Social History Narrative    Lives with     Has 3 kids    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     albuterol (PROVENTIL HFA, VENTOLIN HFA, PROAIR HFA) 90 mcg/actuation inhaler Take 2 Puffs by inhalation every four (4) hours as needed for Wheezing or Shortness of Breath. Ok to sub any brand    docosahexaenoic acid (Prenatal DHA) 200 mg cap capsule Prenatal + DHA    multivitamin capsule multivitamin    acetaminophen (TYLENOL PO) Tylenol 500 mg capsule   Take 2 tablets every 6 hours by oral route. No current facility-administered medications for this visit. Objective:     Vitals:    08/18/20 1356   BP: 130/85   Pulse: 93   Resp: 18   Temp: 97.1 °F (36.2 °C)   TempSrc: Oral   SpO2: 99%   Weight: 217 lb 6.4 oz (98.6 kg)   Height: 5' 4\" (1.626 m)       Physical Examination:  General: Alert, cooperative, no distress, appears stated age. Obese  Eyes: Conjunctivae clear. Pupils equally round and reactive to light, Extraocular muscles intact. Ears: Normal external ear canals both ears. Tympanic membranes clear and mobile bilaterally   Nose: Nares normal. Septum midline. Mucosa normal. No drainage or sinus tenderness. Mouth/Throat: Lips, mucosa, and tongue normal. No oropharyngeal erythema. No tonsillar enlargement or exudate. Neck: Supple, symmetrical, trachea midline, no adenopathy. No thyroid enlargement/tenderness/nodules  Respiratory: Breathing comfortably, in no acute respiratory distress. Clear to auscultation bilaterally. Normal inspiratory and expiratory ratio. - no wheezing with forced exhalation  Cardiovascular: Regular rate and rhythm, S1, S2 normal, no murmur, click, rub or gallop.   -Extremities no edema. Pulses 2+ and symmetric radial and dorsalis pedis   Abdomen: Soft, non-tender, not distended. Bowel sounds normal. No masses or organomegaly. MSK: Extremities normal appearing, atraumatic, no effusion. Gait steady and unassisted. Skin: Skin color, texture, turgor normal. No rashes or lesions on exposed skin.   Lymph nodes: Cervical, supraclavicular, axillary nodes normal.  - palpable ubcentimeter right occipital lymph node, tender  Neurologic: Cranial nerves II-XII intact. Strength 5/5 grossly. Sensation and reflexes normal throughout. Psychiatric: Affect appropriate. Mood euthymic. Thoughts logical. Speech volume and speed normal    No visits with results within 3 Month(s) from this visit.    Latest known visit with results is:   Admission on 12/19/2019, Discharged on 12/23/2019   Component Date Value Ref Range Status    WBC 12/19/2019 11.4* 3.6 - 11.0 K/uL Final    RBC 12/19/2019 4.19  3.80 - 5.20 M/uL Final    HGB 12/19/2019 12.7  11.5 - 16.0 g/dL Final    HCT 12/19/2019 39.7  35.0 - 47.0 % Final    MCV 12/19/2019 94.7  80.0 - 99.0 FL Final    MCH 12/19/2019 30.3  26.0 - 34.0 PG Final    MCHC 12/19/2019 32.0  30.0 - 36.5 g/dL Final    RDW 12/19/2019 14.6* 11.5 - 14.5 % Final    PLATELET 44/17/1514 107  150 - 400 K/uL Final    MPV 12/19/2019 10.8  8.9 - 12.9 FL Final    NRBC 12/19/2019 0.0  0  WBC Final    ABSOLUTE NRBC 12/19/2019 0.00  0.00 - 0.01 K/uL Final    WBC 12/20/2019 17.4* 3.6 - 11.0 K/uL Final    RBC 12/20/2019 2.85* 3.80 - 5.20 M/uL Final    HGB 12/20/2019 8.6* 11.5 - 16.0 g/dL Corrected    Comment: INVESTIGATED PER DELTA CHECK PROTOCOL  NOTIFIED KIARA RN @2241/ZTL  CORRECTED ON 12/20 AT 2242: PREVIOUSLY REPORTED AS INVESTIGATED PER DELTA CHECK PROTOCOL      HCT 12/20/2019 27.5* 35.0 - 47.0 % Final    MCV 12/20/2019 96.5  80.0 - 99.0 FL Final    MCH 12/20/2019 30.2  26.0 - 34.0 PG Final    MCHC 12/20/2019 31.3  30.0 - 36.5 g/dL Final    RDW 12/20/2019 14.6* 11.5 - 14.5 % Final    PLATELET 43/83/0641 628  150 - 400 K/uL Final    MPV 12/20/2019 10.4  8.9 - 12.9 FL Final    NRBC 12/20/2019 0.0  0  WBC Final    ABSOLUTE NRBC 12/20/2019 0.00  0.00 - 0.01 K/uL Final    NEUTROPHILS 12/20/2019 90* 32 - 75 % Final    LYMPHOCYTES 12/20/2019 7* 12 - 49 % Final    MONOCYTES 12/20/2019 3* 5 - 13 % Final    EOSINOPHILS 12/20/2019 0  0 - 7 % Final    BASOPHILS 12/20/2019 0  0 - 1 % Final    IMMATURE GRANULOCYTES 12/20/2019 0  % Final    ABS. NEUTROPHILS 12/20/2019 15.7* 1.8 - 8.0 K/UL Final    ABS. LYMPHOCYTES 12/20/2019 1.2  0.8 - 3.5 K/UL Final    ABS. MONOCYTES 12/20/2019 0.5  0.0 - 1.0 K/UL Final    ABS. EOSINOPHILS 12/20/2019 0.0  0.0 - 0.4 K/UL Final    ABS. BASOPHILS 12/20/2019 0.0  0.0 - 0.1 K/UL Final    ABS. IMM. GRANS. 12/20/2019 0.0  K/UL Final    DF 12/20/2019 MANUAL    Final    RBC COMMENTS 12/20/2019     Final                    Value:ANISOCYTOSIS  1+      WBC 12/21/2019 14.2* 3.6 - 11.0 K/uL Final    RBC 12/21/2019 2.52* 3.80 - 5.20 M/uL Final    HGB 12/21/2019 7.7* 11.5 - 16.0 g/dL Final    HCT 12/21/2019 24.1* 35.0 - 47.0 % Final    MCV 12/21/2019 95.6  80.0 - 99.0 FL Final    MCH 12/21/2019 30.6  26.0 - 34.0 PG Final    MCHC 12/21/2019 32.0  30.0 - 36.5 g/dL Final    RDW 12/21/2019 14.7* 11.5 - 14.5 % Final    PLATELET 59/03/5203 412  150 - 400 K/uL Final    MPV 12/21/2019 10.7  8.9 - 12.9 FL Final    NRBC 12/21/2019 0.0  0  WBC Final    ABSOLUTE NRBC 12/21/2019 0.00  0.00 - 0.01 K/uL Final    NEUTROPHILS 12/21/2019 78* 32 - 75 % Final    LYMPHOCYTES 12/21/2019 16  12 - 49 % Final    MONOCYTES 12/21/2019 5  5 - 13 % Final    EOSINOPHILS 12/21/2019 0  0 - 7 % Final    BASOPHILS 12/21/2019 0  0 - 1 % Final    IMMATURE GRANULOCYTES 12/21/2019 1* 0.0 - 0.5 % Final    ABS. NEUTROPHILS 12/21/2019 11.2* 1.8 - 8.0 K/UL Final    ABS. LYMPHOCYTES 12/21/2019 2.2  0.8 - 3.5 K/UL Final    ABS. MONOCYTES 12/21/2019 0.6  0.0 - 1.0 K/UL Final    ABS. EOSINOPHILS 12/21/2019 0.1  0.0 - 0.4 K/UL Final    ABS. BASOPHILS 12/21/2019 0.0  0.0 - 0.1 K/UL Final    ABS. IMM. GRANS.  12/21/2019 0.1* 0.00 - 0.04 K/UL Final    DF 12/21/2019 AUTOMATED    Final    WBC 12/22/2019 13.3* 3.6 - 11.0 K/uL Final    RBC 12/22/2019 2.62* 3.80 - 5.20 M/uL Final    HGB 12/22/2019 8.0* 11.5 - 16.0 g/dL Final    HCT 12/22/2019 25.2* 35.0 - 47.0 % Final    MCV 12/22/2019 96.2  80.0 - 99.0 FL Final    MCH 12/22/2019 30.5  26.0 - 34.0 PG Final    MCHC 12/22/2019 31.7  30.0 - 36.5 g/dL Final    RDW 12/22/2019 14.6* 11.5 - 14.5 % Final    PLATELET 92/94/2071 773  150 - 400 K/uL Final    MPV 12/22/2019 10.5  8.9 - 12.9 FL Final    NRBC 12/22/2019 0.0  0  WBC Final    ABSOLUTE NRBC 12/22/2019 0.00  0.00 - 0.01 K/uL Final         Assessment/ Plan:   Diagnoses and all orders for this visit:    1. Shortness of breath  -     PULMONARY FUNCTION TEST; Future  -     XR CHEST PA LAT; Future    2. Chest tightness  -     PULMONARY FUNCTION TEST; Future  -     XR CHEST PA LAT; Future    3. Lymph node enlargement  -     CBC WITH AUTOMATED DIFF      For today's visit, I did the following:  · Reviewed labs in detail or ordered lab  Subjective shortness of breath not present on exanination. Suspect snus drainage with forceful clearing of throat (as demonstrated by patient) vs psychogenic yawning vs lung pathology. Chest xray and PFT to evaluate. Add sinus rinse for symptoms for now. Lymph node enlargement, reportedly in multiple area but only right occipital subcentimeter node remains. CBC to evaluate. Provided reassurance that this will likely resolve on its own. If not resolved in 1 month consider US. Educated patient on red flag symptoms to warrant return to clinic or emergency room visit. I have discussed the diagnosis with the patient and the intended plan as seen in the above orders. The patient has been offered or received an after-visit summary and questions were answered concerning future plans. I have discussed medication side effects and warnings with the patient as well. Follow-up and Dispositions    · Return if symptoms worsen or fail to improve, for Follow Up with Dr. Alyssia Griffin.  José Stephen MD  8/18/2020

## 2021-10-18 ENCOUNTER — TELEPHONE (OUTPATIENT)
Dept: FAMILY MEDICINE CLINIC | Age: 41
End: 2021-10-18

## 2021-10-18 NOTE — TELEPHONE ENCOUNTER
----- Message from Dean Arnold sent at 10/18/2021  4:35 PM EDT -----  Subject: Appointment Request    Reason for Call: New Patient Request Appointment    QUESTIONS  Type of Appointment? New Patient/New to Provider  Reason for appointment request? No appointments available during search  Additional Information for Provider? patient is looking to schedule an   appointment to establish care with a new provider in this practice, as   soon as possible.  ---------------------------------------------------------------------------  --------------  CALL BACK INFO  What is the best way for the office to contact you? OK to leave message on   voicemail  Preferred Call Back Phone Number? [phone]  ---------------------------------------------------------------------------  --------------  SCRIPT ANSWERS  Relationship to Patient? Self  Specialty Confirmation? Primary Care  Is this the first appointment to establish care for a ? No  Have you been diagnosed with, awaiting test results for, or told that you   are suspected of having COVID-19 (Coronavirus)? (If patient has tested   negative or was tested as a requirement for work, school, or travel and   not based on symptoms, answer no)? No  Within the past two weeks have you developed any of the following symptoms   (answer no if symptoms have been present longer than 2 weeks or began   more than 2 weeks ago)? Fever or Chills, Cough, Shortness of breath or   difficulty breathing, Loss of taste or smell, Sore throat, Nasal   congestion, Sneezing or runny nose, Fatigue or generalized body aches   (answer no if pain is specific to a body part e.g. back pain), Diarrhea,   Headache? No  Have you had close contact with someone with COVID-19 in the last 14 days? No  (Service Expert  click yes below to proceed with [employer] As Usual   Scheduling)?  Yes

## 2021-11-08 ENCOUNTER — VIRTUAL VISIT (OUTPATIENT)
Dept: FAMILY MEDICINE CLINIC | Age: 41
End: 2021-11-08
Payer: COMMERCIAL

## 2021-11-08 DIAGNOSIS — E66.01 SEVERE OBESITY (HCC): ICD-10-CM

## 2021-11-08 DIAGNOSIS — M25.50 ARTHRALGIA, UNSPECIFIED JOINT: Primary | ICD-10-CM

## 2021-11-08 DIAGNOSIS — R26.89 BALANCE PROBLEM: ICD-10-CM

## 2021-11-08 DIAGNOSIS — K90.49 GASTROINTESTINAL INTOLERANCE TO FOODS: ICD-10-CM

## 2021-11-08 PROCEDURE — 99212 OFFICE O/P EST SF 10 MIN: CPT | Performed by: NURSE PRACTITIONER

## 2021-11-08 RX ORDER — NYSTATIN 100000 [USP'U]/G
POWDER TOPICAL 3 TIMES DAILY
Qty: 60 G | Refills: 1 | Status: SHIPPED | OUTPATIENT
Start: 2021-11-08 | End: 2022-10-11

## 2021-11-08 RX ORDER — LORATADINE 10 MG/1
10 TABLET ORAL
COMMUNITY

## 2021-11-08 RX ORDER — ASCORBIC ACID 250 MG
TABLET ORAL
COMMUNITY

## 2021-11-08 NOTE — PROGRESS NOTES
[facility] Note       Elizabeth Aleman is a 39 y.o. female who was seen by synchronous (real-time) audio-video technology on 11/8/2021 for Joint Pain (for a year in fingers bilateral,toes feet ) and GI Problem (2 months nausea after eating,cramping with bowel movements )        Assessment & Plan:   Diagnoses and all orders for this visit:    1. Arthralgia, unspecified joint  -     CELIAC ANTIBODY PROFILE; Future  -     CBC WITH AUTOMATED DIFF; Future  -     METABOLIC PANEL, COMPREHENSIVE; Future  -     RA + CCP ABS; Future  -     RHEUMATOID FACTOR, QL  -     MESFIN, DIRECT, W/REFLEX; Future  -     TSH 3RD GENERATION; Future  -     T4 (THYROXINE); Future  -     SED RATE (ESR); Future  - Consider Rheumatology referral    2. Gastrointestinal intolerance to foods  -     CELIAC ANTIBODY PROFILE; Future  -     CBC WITH AUTOMATED DIFF; Future  -     METABOLIC PANEL, COMPREHENSIVE; Future  -     RA + CCP ABS; Future  -     RHEUMATOID FACTOR, QL  -     MESFIN, DIRECT, W/REFLEX; Future  -     TSH 3RD GENERATION; Future  -     T4 (THYROXINE); Future  -     SED RATE (ESR); Future    3. Severe obesity ([address]  - Continue dietary changes  - Ongoing weight loss in progress    Wt Readings from Last 3 Encounters:   08/18/20 217 lb 6.4 oz (98.6 kg)   12/19/19 238 lb (108 kg)   12/16/19 238 lb (108 kg)     4. Balance problem  - Referred to ENT       I spent at least 13 minutes on this visit with this established patient. Subjective:     Elizabeth Aleman is a 39 y.o. female joint pain x 1 yr and nausea. The knuckles in morning get \"stuck\" goes away later in the morning. She started an anti-inflammatory diet about 1 month ago and noticed an improvement in joint pain. She has also stopped eating out / fast food. Endorses RUQ discomfort with episodes of nausea. She will feel \"bad\" about an hour after eating then goes away. This has also improved with her dietary changes. Denies vomiting.  Denies constipation. Concern for problems with her equiplibrium. Looking down makes it worse. She feels that her \"balance is off\". When it occurs, it will last most of the day, until 3-4p. Allergies and ear fullness. Taking cetirizine. Denies falls. Prior to Admission medications    Medication Sig Start Date End Date Taking? Authorizing Provider   loratadine (Claritin) 10 mg tablet Take 10 mg by mouth. Yes Provider, Historical   ascorbic acid, vitamin C, (Vitamin C) 250 mg tablet Take  by mouth. Yes Provider, Historical   nystatin (MYCOSTATIN) powder Apply  to affected area three (3) times daily. 11/8/21  Yes Dragan Cassidy, NP   albuterol (PROVENTIL HFA, VENTOLIN HFA, PROAIR HFA) 90 mcg/actuation inhaler Take 2 Puffs by inhalation every four (4) hours as needed for Wheezing or Shortness of Breath. Ok to sub any brand 7/24/20  Yes Amrita Patterson MD   docosahexaenoic acid (Prenatal DHA) 200 mg cap capsule Prenatal + DHA  Patient not taking: Reported on 11/8/2021    Provider, Historical   multivitamin capsule multivitamin    Provider, Historical   acetaminophen (TYLENOL PO) Tylenol 500 mg capsule   Take 2 tablets every 6 hours by oral route. Patient not taking: Reported on 11/8/2021    Provider, Historical   cetirizine (ZYRTEC) 5 mg tablet Take  by mouth. Patient not taking: Reported on 11/8/2021    Provider, Historical         Review of Systems   Constitutional: Negative. HENT: Negative. Eyes: Negative. Respiratory: Negative. Cardiovascular: Negative. Gastrointestinal: Positive for abdominal pain and nausea. Negative for blood in stool, constipation, diarrhea, heartburn and vomiting. Genitourinary: Negative. Musculoskeletal: Positive for joint pain. Negative for back pain, falls and myalgias. Skin: Negative. Neurological: Positive for dizziness (Equilibrium changes).  Negative for tingling, sensory change, speech change, focal weakness, loss of consciousness, weakness and headaches. Psychiatric/Behavioral: Negative. Objective:     Patient-Reported Vitals 11/8/2021   Patient-Reported Weight 209 lb        [INSTRUCTIONS:  \"[x]\" Indicates a positive item  \"[]\" Indicates a negative item  -- DELETE ALL ITEMS NOT EXAMINED]    Constitutional: [x] Appears well-developed and well-nourished [x] No apparent distress      [] Abnormal -     Mental status: [x] Alert and awake  [x] Oriented to person/place/time [x] Able to follow commands    [] Abnormal -     Eyes:   EOM    [x]  Normal    [] Abnormal -   Sclera  [x]  Normal    [] Abnormal -          Discharge [x]  None visible   [] Abnormal -     HENT: [x] Normocephalic, atraumatic  [] Abnormal -   [x] Mouth/Throat: Mucous membranes are moist    External Ears [x] Normal  [] Abnormal -    Neck: [x] No visualized mass [] Abnormal -     Pulmonary/Chest: [x] Respiratory effort normal   [x] No visualized signs of difficulty breathing or respiratory distress        [] Abnormal -      Musculoskeletal:   [x] Normal gait with no signs of ataxia         [x] Normal range of motion of neck        [] Abnormal -     Neurological:        [x] No Facial Asymmetry (Cranial nerve 7 motor function) (limited exam due to video visit)          [x] No gaze palsy        [] Abnormal -          Skin:        [x] No significant exanthematous lesions or discoloration noted on facial skin         [] Abnormal -            Psychiatric:       [x] Normal Affect [] Abnormal -        [x] No Hallucinations    Other pertinent observable physical exam findings:-        We discussed the expected course, resolution and complications of the diagnosis(es) in detail. Medication risks, benefits, costs, interactions, and alternatives were discussed as indicated. I advised her to contact the office if her condition worsens, changes or fails to improve as anticipated. She expressed understanding with the diagnosis(es) and plan.        Reji Daniels, was evaluated through a synchronous (real-time) audio-video encounter. The patient (or guardian if applicable) is aware that this is a billable service. Verbal consent to proceed has been obtained within the past 12 months. The visit was conducted pursuant to the emergency declaration under the [address] [place] [address], [address] [place] [address] authority and the [employer] and [employer] Act. Patient identification was verified, and a caregiver was present when appropriate. The patient was located in a state where the provider was credentialed to provide care.       Liat Armijo NP

## 2021-11-08 NOTE — PROGRESS NOTES
Chief Complaint   Patient presents with    Joint Pain     for a year in fingers bilateral,toes feet     GI Problem     2 months nausea after eating,cramping with bowel movements      Patient states she has changed diet. She has eliminated red meat,pasta and bread and the GI issues are better. 3 most recent PHQ Screens 11/8/2021   Little interest or pleasure in doing things Not at all   Feeling down, depressed, irritable, or hopeless Not at all   Total Score PHQ 2 0     Abuse Screening Questionnaire 11/8/2021   Do you ever feel afraid of your partner? N   Are you in a relationship with someone who physically or mentally threatens you? N   Is it safe for you to go home? Y       1. Have you been to the ER, urgent care clinic since your last visit? Hospitalized since your last visit?no    2. Have you seen or consulted any other health care providers outside of the [address] [place] [address] since your last visit? Include any pap smears or colon screening.  no

## 2021-11-09 NOTE — PATIENT INSTRUCTIONS
Joint Pain: Care Instructions  Your Care Instructions     Many people have small aches and pains from overuse or injury to muscles and joints. Joint injuries often happen during sports or recreation, work tasks, or projects around the home. An overuse injury can happen when you put too much stress on a joint or when you do an activity that stresses the joint over and over, such as using the computer or rowing a boat. You can take action at home to help your muscles and joints get better. You should feel better in 1 to 2 weeks, but it can take 3 months or more to heal completely. Follow-up care is a key part of your treatment and safety. Be sure to make and go to all appointments, and call your doctor if you are having problems. It's also a good idea to know your test results and keep a list of the medicines you take. How can you care for yourself at home? · Do not put weight on the injured joint for at least a day or two. · For the first day or two after an injury, do not take hot showers or baths, and do not use hot packs. The heat could make swelling worse. · Put ice or a cold pack on the sore joint for 10 to 20 minutes at a time. Try to do this every 1 to 2 hours for the next 3 days (when you are awake) or until the swelling goes down. Put a thin cloth between the ice and your skin. · Wrap the injury in an elastic bandage. Do not wrap it too tightly because this can cause more swelling. · Prop up the sore joint on a pillow when you ice it or anytime you sit or lie down during the next 3 days. Try to keep it above the level of your heart. This will help reduce swelling. · Take an over-the-counter pain medicine, such as acetaminophen (Tylenol), ibuprofen (Advil, Motrin), or naproxen (Aleve). Read and follow all instructions on the label. · After 1 or 2 days of rest, begin moving the joint gently.  While the joint is still healing, you can begin to exercise using activities that do not strain or hurt the painful joint. When should you call for help? Call your doctor now or seek immediate medical care if:    · You have signs of infection, such as:  ? Increased pain, swelling, warmth, and redness. ? Red streaks leading from the joint. ? A fever. Watch closely for changes in your health, and be sure to contact your doctor if:    · Your movement or symptoms are not getting better after 1 to 2 weeks of home treatment. Where can you learn more? Go to http://www.gray.com/  Enter P205 in the search box to learn more about \"Joint Pain: Care Instructions. \"  Current as of: July 1, 2021               Content Version: 13.0  © 9783-2293 [employer]. Care instructions adapted under license by [employer] (which disclaims liability or warranty for this information). If you have questions about a medical condition or this instruction, always ask your healthcare professional. [address] any warranty or liability for your use of this information.

## 2022-03-18 PROBLEM — O09.523 ADVANCED MATERNAL AGE IN MULTIGRAVIDA, THIRD TRIMESTER: Status: ACTIVE | Noted: 2019-12-17

## 2022-03-18 PROBLEM — Z86.59 HISTORY OF OCD (OBSESSIVE COMPULSIVE DISORDER): Status: ACTIVE | Noted: 2019-01-11

## 2022-03-19 PROBLEM — F32.81 PMDD (PREMENSTRUAL DYSPHORIC DISORDER): Status: ACTIVE | Noted: 2019-04-11

## 2022-03-19 PROBLEM — O42.90 PREMATURE RUPTURE OF MEMBRANES: Status: ACTIVE | Noted: 2019-12-19

## 2022-03-19 PROBLEM — Z86.59 HISTORY OF PANIC ATTACKS: Status: ACTIVE | Noted: 2019-01-11

## 2022-03-19 PROBLEM — Z3A.38 38 WEEKS GESTATION OF PREGNANCY: Status: ACTIVE | Noted: 2019-12-16

## 2022-03-20 PROBLEM — L30.9 ECZEMA: Status: ACTIVE | Noted: 2019-01-12

## 2022-03-20 PROBLEM — O32.0XX0 UNSTABLE LIE OF FETUS: Status: ACTIVE | Noted: 2019-12-16

## 2022-03-20 PROBLEM — N89.8 VAGINAL DISCHARGE DURING PREGNANCY IN THIRD TRIMESTER: Status: ACTIVE | Noted: 2019-12-16

## 2022-03-20 PROBLEM — O26.843 UTERINE SIZE DATE DISCREPANCY PREGNANCY, THIRD TRIMESTER: Status: ACTIVE | Noted: 2019-12-16

## 2022-03-20 PROBLEM — E66.01 SEVERE OBESITY (HCC): Status: ACTIVE | Noted: 2019-01-12

## 2022-03-20 PROBLEM — O26.893 VAGINAL DISCHARGE DURING PREGNANCY IN THIRD TRIMESTER: Status: ACTIVE | Noted: 2019-12-16

## 2022-04-12 LAB — RHEUMATOID FACT SERPL-ACNC: <10 IU/ML (ref 0–15)

## 2022-10-11 ENCOUNTER — OFFICE VISIT (OUTPATIENT)
Dept: FAMILY MEDICINE CLINIC | Age: 42
End: 2022-10-11
Payer: COMMERCIAL

## 2022-10-11 VITALS
HEART RATE: 93 BPM | WEIGHT: 198 LBS | HEIGHT: 64 IN | SYSTOLIC BLOOD PRESSURE: 115 MMHG | BODY MASS INDEX: 33.8 KG/M2 | OXYGEN SATURATION: 95 % | TEMPERATURE: 98.3 F | DIASTOLIC BLOOD PRESSURE: 75 MMHG | RESPIRATION RATE: 17 BRPM

## 2022-10-11 DIAGNOSIS — K21.9 GASTROESOPHAGEAL REFLUX DISEASE, UNSPECIFIED WHETHER ESOPHAGITIS PRESENT: Primary | ICD-10-CM

## 2022-10-11 DIAGNOSIS — R10.11 RUQ PAIN: ICD-10-CM

## 2022-10-11 PROBLEM — K76.0 HEPATIC STEATOSIS: Status: ACTIVE | Noted: 2022-10-11

## 2022-10-11 PROCEDURE — 99214 OFFICE O/P EST MOD 30 MIN: CPT | Performed by: STUDENT IN AN ORGANIZED HEALTH CARE EDUCATION/TRAINING PROGRAM

## 2022-10-11 RX ORDER — AMOXICILLIN AND CLAVULANATE POTASSIUM 875; 125 MG/1; MG/1
TABLET, FILM COATED ORAL
COMMUNITY
Start: 2022-10-02

## 2022-10-11 RX ORDER — PANTOPRAZOLE SODIUM 40 MG/1
40 TABLET, DELAYED RELEASE ORAL DAILY
Qty: 30 TABLET | Refills: 0 | Status: SHIPPED | OUTPATIENT
Start: 2022-10-11 | End: 2022-11-10

## 2022-10-11 NOTE — PROGRESS NOTES
Chief Complaint   Patient presents with    Abdominal Pain     Have always struggle with GI over the weekend had palpitations, cough squeezing on chest last for about an hour and subside. Have not had another episode yesterday or today. 1. \"Have you been to the ER, urgent care clinic since your last visit? Hospitalized since your last visit? \" No    2. \"Have you seen or consulted any other health care providers outside of the [address] [place] [address] since your last visit? \" No     3. For patients aged 39-70: Has the patient had a colonoscopy / FIT/ Cologuard? NA - based on age      If the patient is female:    4. For patients aged 41-77: Has the patient had a mammogram within the past 2 years? Yes - no Care Gap present      5. For patients aged 21-65: Has the patient had a pap smear?  Yes - no Care Gap present         3 most recent PHQ Screens 10/11/2022   Little interest or pleasure in doing things Not at all   Feeling down, depressed, irritable, or hopeless Not at all   Total Score PHQ 2 0   Trouble falling or staying asleep, or sleeping too much Not at all   Feeling tired or having little energy Not at all   Poor appetite, weight loss, or overeating Not at all   Feeling bad about yourself - or that you are a failure or have let yourself or your family down Not at all   Trouble concentrating on things such as school, work, reading, or watching TV Not at all   Moving or speaking so slowly that other people could have noticed; or the opposite being so fidgety that others notice Not at all   Thoughts of being better off dead, or hurting yourself in some way Not at all   PHQ 9 Score 0   How difficult have these problems made it for you to do your work, take care of your home and get along with others Not difficult at all       Health Maintenance Due   Topic Date Due    COVID-19 Vaccine (1) Never done    Cervical cancer screen  Never done    Flu Vaccine (1) 08/01/2022

## 2022-10-11 NOTE — PROGRESS NOTES
[facility]      Chief Complaint:     Chief Complaint   Patient presents with    Abdominal Pain     Have always struggle with GI over the weekend had palpitations, cough squeezing on chest last for about an hour and subside. Have not had another episode yesterday or today. Lilly Fam is a 43 y.o. female that presents for: abdominal pain      Assessment/Plan:     Diagnoses and all orders for this visit:    1. Gastroesophageal reflux disease, unspecified whether esophagitis present  -     pantoprazole (PROTONIX) 40 mg tablet; Take 1 Tablet by mouth daily for 30 days. - Align probiotic  - Follow up in 4 weeks    2. RUQ pain: Last US was 3 years ago, normal. Still having biliary colic like symptoms. Opted not to have surgery in the past because US was normal.   -      ABD LTD; Future         Follow up: Follow-up and Dispositions    Return in about 4 weeks (around 11/8/2022) for follow up GERD . If still having palpitations get EKG     Subjective:   HPI:  Lilly Fam is a 43 y.o. female that presents for:    Abdominal pain after eating:   Saw GI Dr. Jessenia Villegas earlier this year (for the first time, telehealth visit) diagnosed with IBS. Has upper abdominal/ right sided abdominal pain after meals chronically. Has worked on diet which has helped, decreased fatty foods. 2-3 weeks ago had URI and was RX Augmentin, and triggered diarrhea, loss of appetite. 4 days ago had pizza, in 15 minutes had palpitations, chest tightness and cough. Having heartburn as well. These episodes happened every time after eating for a few days. This all stopped 2 days ago. No palpitations when she wasn't eating.    Didn't try tums (has hard time taking medication)    No fevers, chills, low back pain, nausea, vomiting, diarrhea, cp, sob, palpitations currently     Had holter monitor in the past for tachycardia- it was normal      Health Maintenance:  Health Maintenance Due   Topic Date Due COVID-19 Vaccine (1) Never done    Cervical cancer screen  Never done    Flu Vaccine (1) 08/01/2022        ROS:   See HPI      Past medical history, social history, and medications personally reviewed. Past Medical History:   Diagnosis Date    Asthma     with illness; last needed inhaler > 1 year ago    Generalized anxiety disorder     last took medication 3 years ago    Hepatic steatosis 10/11/2022    History of palpitations in adulthood 2015    saw Dr. Laura Raymundo 6/2017, echo normal, sinus tach on holter monitor , murmur noted on exam    IBS (irritable bowel syndrome) 05/31/2022    OCD (obsessive compulsive disorder)     Panic disorder     Postpartum depression     with 1st pregnancy        Allergies personally reviewed. Allergies   Allergen Reactions    Cyanocobalamin (Vitamin B12) Cough    Macrobid [Nitrofurantoin Monohyd/M-Cryst] Other (comments)     Throat closed up    Pristiq [Desvenlafaxine Succinate] Other (comments)     Made her more depressed          Objective:   Vitals reviewed. Visit Vitals  /75 (BP 1 Location: Left upper arm, BP Patient Position: Sitting, BP Cuff Size: Adult)   Pulse 93   Temp 98.3 °F (36.8 °C) (Temporal)   Resp 17   Ht 5' 4\" (1.626 m)   Wt 198 lb (89.8 kg)   LMP 09/24/2022 (Exact Date)   SpO2 95%   Breastfeeding No   BMI 33.99 kg/m²        Wt Readings from Last 3 Encounters:   10/11/22 198 lb (89.8 kg)   08/18/20 217 lb 6.4 oz (98.6 kg)   12/19/19 238 lb (108 kg)        Physical Exam  Physical Exam     Vitals: Reviewed. General: AO x 3. No distress. Not diaphoretic. No jaundice. No cyanosis. HEENT: No thyromegaly or nodules   Cardio: Normal rate, regular rhythm. No murmur, rubs, or gallop. Pulmonary: Effort normal. No accessory muscle use. No wheezes, rales, or rhonchi. Abdominal: Soft. No rebound or guarding. No tenderness. No distension. Extremities: No edema of lower extremities. Pulses 2+. Skin: No rash.       I have reviewed pertinent labs results and other data. I have discussed the diagnosis with the patient and the intended plan as seen in the above orders. The patient has received an after-visit summary and questions were answered concerning future plans. I have discussed medication side effects and warnings with the patient as well.     Sabrina Robertson,   10/11/22

## 2022-11-14 ENCOUNTER — OFFICE VISIT (OUTPATIENT)
Dept: FAMILY MEDICINE CLINIC | Age: 42
End: 2022-11-14
Payer: COMMERCIAL

## 2022-11-14 VITALS
HEIGHT: 64 IN | DIASTOLIC BLOOD PRESSURE: 71 MMHG | RESPIRATION RATE: 16 BRPM | HEART RATE: 88 BPM | SYSTOLIC BLOOD PRESSURE: 106 MMHG | OXYGEN SATURATION: 100 % | BODY MASS INDEX: 32.61 KG/M2 | WEIGHT: 191 LBS | TEMPERATURE: 97.7 F

## 2022-11-14 DIAGNOSIS — K58.2 IRRITABLE BOWEL SYNDROME WITH BOTH CONSTIPATION AND DIARRHEA: ICD-10-CM

## 2022-11-14 DIAGNOSIS — Z00.00 ANNUAL PHYSICAL EXAM: ICD-10-CM

## 2022-11-14 DIAGNOSIS — Z23 NEEDS FLU SHOT: ICD-10-CM

## 2022-11-14 DIAGNOSIS — K21.9 GASTROESOPHAGEAL REFLUX DISEASE, UNSPECIFIED WHETHER ESOPHAGITIS PRESENT: Primary | ICD-10-CM

## 2022-11-14 DIAGNOSIS — R00.2 PALPITATIONS: ICD-10-CM

## 2022-11-14 PROCEDURE — 99214 OFFICE O/P EST MOD 30 MIN: CPT | Performed by: STUDENT IN AN ORGANIZED HEALTH CARE EDUCATION/TRAINING PROGRAM

## 2022-11-14 PROCEDURE — 90471 IMMUNIZATION ADMIN: CPT | Performed by: STUDENT IN AN ORGANIZED HEALTH CARE EDUCATION/TRAINING PROGRAM

## 2022-11-14 PROCEDURE — 90686 IIV4 VACC NO PRSV 0.5 ML IM: CPT | Performed by: STUDENT IN AN ORGANIZED HEALTH CARE EDUCATION/TRAINING PROGRAM

## 2022-11-14 PROCEDURE — 99396 PREV VISIT EST AGE 40-64: CPT | Performed by: STUDENT IN AN ORGANIZED HEALTH CARE EDUCATION/TRAINING PROGRAM

## 2022-11-14 RX ORDER — PANTOPRAZOLE SODIUM 40 MG/1
40 TABLET, DELAYED RELEASE ORAL DAILY
Qty: 30 TABLET | Refills: 0 | Status: SHIPPED | OUTPATIENT
Start: 2022-11-14 | End: 2022-12-14

## 2022-11-14 NOTE — PROGRESS NOTES
Chief Complaint   Patient presents with    Physical       1. \"Have you been to the ER, urgent care clinic since your last visit? Hospitalized since your last visit? \" No    2. \"Have you seen or consulted any other health care providers outside of the [address] [place] [address] since your last visit? \" No     3. For patients over 45: Has the patient had a colonoscopy? NA - based on age     If the patient is female:    4. For patients over 40: Has the patient had a mammogram? Yes - no Care Gap present    5. For patients over 21: Has the patient had a pap smear? Yes - Care Gap present. Rooming MA/LPN to request most recent results [facility]     3 most recent [facility] 36 Screens 11/14/2022   Little interest or pleasure in doing things Not at all   Feeling down, depressed, irritable, or hopeless Not at all   Total Score PHQ 2 0   Trouble falling or staying asleep, or sleeping too much -   Feeling tired or having little energy -   Poor appetite, weight loss, or overeating -   Feeling bad about yourself - or that you are a failure or have let yourself or your family down -   Trouble concentrating on things such as school, work, reading, or watching TV -   Moving or speaking so slowly that other people could have noticed; or the opposite being so fidgety that others notice -   Thoughts of being better off dead, or hurting yourself in some way -   PHQ 9 Score -   How difficult have these problems made it for you to do your work, take care of your home and get along with others -         Abuse Screening Questionnaire 11/14/2022   Do you ever feel afraid of your partner? N   Are you in a relationship with someone who physically or mentally threatens you? N   Is it safe for you to go home?  Y          Health Maintenance Due   Topic Date Due    Cervical cancer screen  Never done    COVID-19 Vaccine (3 - Booster for Moderna series) 02/16/2022    Flu Vaccine (1) 08/01/2022

## 2022-11-14 NOTE — PROGRESS NOTES
[facility]      Chief Complaint:     Chief Complaint   Patient presents with    Physical       Mary Macias is a 43 y.o. female that presents for: GERD      Assessment/Plan:     Diagnoses and all orders for this visit:    1. Gastroesophageal reflux disease, unspecified whether esophagitis present: and IBS. Improving since starting Protonix. Unable to tolerate Align (nausea). Right sided abdominal pain improved. -     pantoprazole (PROTONIX) 40 mg tablet; Take 1 Tablet by mouth daily for 30 days. - Continue diet modification/ exercise  - US if sxs worsen    2. Palpitations: resolved, likely episode of esophageal constriction brought on by heartburn. Resolved since starting Protonix. 3. Annual physical exam  -     [address] [place] [address] [employer]  - Pap up to date- requesting from OBGYN  - Flu shot today  - Discussed COVID booster- gets anxiety with shots, declines    4. Needs flu shot  -     INFLUENZA, FLUARIX, FLULAVAL, FLUZONE (AGE 6 MO+), AFLURIA(AGE 3Y+) IM, PF, 0.5 ML  -     MT IMMUNIZ ADMIN,1 SINGLE/COMB VAC/TOXOID         Follow up: Follow-up and Dispositions    Return in about 6 months (around 5/14/2023) for GERD follow up. Subjective:   HPI:  Mary Macias is a 43 y.o. female that presents for:    GERD:  Took 2-3 for episodes to subside, now completely resoled. Still having bloating and belching in AM. Right sided pain has improved as well. It is worse after eating fatty meals. Worse with gluten. Probiotic made her nauseated. Didn't get US because she was feeling better. Has been walking more, cutting down caffeine and sugar, low fat diet.     Palpitations: resolved    Health Maintenance:  Health Maintenance Due   Topic Date Due    Cervical cancer screen  Never done    COVID-19 Vaccine (3 - Booster for Moderna series) 02/16/2022      Pap smear: 3 years ago was normal     ROS:   See HPI      Past medical history, social history, and medications personally reviewed. Past Medical History:   Diagnosis Date    Asthma     with illness; last needed inhaler > 1 year ago    Generalized anxiety disorder     last took medication 3 years ago    Hepatic steatosis 10/11/2022    History of palpitations in adulthood 2015    saw Dr. Casey Mccarty 6/2017, echo normal, sinus tach on holter monitor , murmur noted on exam    IBS (irritable bowel syndrome) 05/31/2022    OCD (obsessive compulsive disorder)     Panic disorder     Postpartum depression     with 1st pregnancy        Allergies personally reviewed. Allergies   Allergen Reactions    Cyanocobalamin (Vitamin B12) Cough    Macrobid [Nitrofurantoin Monohyd/M-Cryst] Other (comments)     Throat closed up    Pristiq [Desvenlafaxine Succinate] Other (comments)     Made her more depressed          Objective:   Vitals reviewed. Visit Vitals  /71 (BP 1 Location: Right upper arm, BP Patient Position: Sitting, BP Cuff Size: Large adult)   Pulse 88   Temp 97.7 °F (36.5 °C) (Temporal)   Resp 16   Ht 5' 4\" (1.626 m)   Wt 191 lb (86.6 kg)   LMP 10/21/2022   SpO2 100%   BMI 32.79 kg/m²        Wt Readings from Last 3 Encounters:   11/14/22 191 lb (86.6 kg)   10/11/22 198 lb (89.8 kg)   08/18/20 217 lb 6.4 oz (98.6 kg)        Physical Exam  Physical Exam     Vitals: Reviewed. General: AO x 3. No distress. Not diaphoretic. No jaundice. No cyanosis. No pallor. HEENT: No thyromegaly or JVD  Cardio: Normal rate, regular rhythm. No murmur, rubs, or gallop. Pulmonary: Effort normal. No accessory muscle use. No wheezes, rales, or rhonchi. Abdominal: Soft. No rebound or guarding. No tenderness. No distension. Extremities: No edema of lower extremities. Pulses 2+. Neurological: CN II-XII grossly intact. No focal deficits. Skin: No rash. I have reviewed pertinent labs results and other data. I have discussed the diagnosis with the patient and the intended plan as seen in the above orders.  The patient has received an after-visit summary and questions were answered concerning future plans. I have discussed medication side effects and warnings with the patient as well.     Jaida Wright, DO  11/14/22

## 2022-11-14 NOTE — LETTER
11/14/2022    [facility]    Please fax us the most recent PAP SMEAR so that we may update the patient's records for continuity of care.      Our fax number: [phone]    Patient:   Sobia Michaud  1980      Sincerely,     Meggan King DO

## 2023-01-06 ENCOUNTER — NURSE TRIAGE (OUTPATIENT)
Dept: OTHER | Facility: CLINIC | Age: 43
End: 2023-01-06

## 2023-01-06 NOTE — TELEPHONE ENCOUNTER
Location of patient: [place]    Received call from [place] [address] at [facility] with [employer]. Subjective: Caller states \"abdominal pain and extreme fatigue\"     Current Symptoms: see above, saw doctor 2 months ago for reflux, was given some meds that helped. She has IBS, gets extreme fatigue, rests and it goes away. This happens when she is sick, and resolves when the illness goes away. Some foods can trigger this. Now has abdominal discomfort today. Sometimes feels sour, sometimes burns, pain goes around to different areas. On going issue. Onset: 2 days ago; worsening    Associated Symptoms: reduced appetite    Pain Severity: 2/10; burning; intermittent    Temperature: n/a     What has been tried: protonix, green tea with probiotic    LMP:  yesterday  Pregnant: No    Recommended disposition: See HCP within 4 Hours (or PCP triage)    Care advice provided, patient verbalizes understanding; denies any other questions or concerns; instructed to call back for any new or worsening symptoms. Patient/Caller agrees with recommended disposition; writer provided warm transfer to Marly at [facility] for appointment scheduling    Attention Provider: Thank you for allowing me to participate in the care of your patient. The patient was connected to triage in response to information provided to the [employer]. Please do not respond through this encounter as the response is not directed to a shared pool.     Reason for Disposition   [1] MODERATE weakness (i.e., interferes with work, school, normal activities) AND [2] cause unknown  (Exceptions: weakness with acute minor illness, or weakness from poor fluid intake)    Protocols used: Weakness (Generalized) and Fatigue-ADULT-

## 2023-01-09 ENCOUNTER — APPOINTMENT (OUTPATIENT)
Dept: CT IMAGING | Age: 43
End: 2023-01-09
Attending: STUDENT IN AN ORGANIZED HEALTH CARE EDUCATION/TRAINING PROGRAM
Payer: COMMERCIAL

## 2023-01-09 ENCOUNTER — OFFICE VISIT (OUTPATIENT)
Dept: FAMILY MEDICINE CLINIC | Age: 43
End: 2023-01-09
Payer: COMMERCIAL

## 2023-01-09 ENCOUNTER — HOSPITAL ENCOUNTER (EMERGENCY)
Age: 43
Discharge: HOME | End: 2023-01-09
Attending: STUDENT IN AN ORGANIZED HEALTH CARE EDUCATION/TRAINING PROGRAM
Payer: COMMERCIAL

## 2023-01-09 VITALS
RESPIRATION RATE: 18 BRPM | TEMPERATURE: 98 F | BODY MASS INDEX: 31.56 KG/M2 | DIASTOLIC BLOOD PRESSURE: 76 MMHG | OXYGEN SATURATION: 95 % | HEART RATE: 81 BPM | SYSTOLIC BLOOD PRESSURE: 114 MMHG | WEIGHT: 183.86 LBS

## 2023-01-09 VITALS
WEIGHT: 186 LBS | SYSTOLIC BLOOD PRESSURE: 118 MMHG | OXYGEN SATURATION: 97 % | TEMPERATURE: 98.4 F | DIASTOLIC BLOOD PRESSURE: 79 MMHG | RESPIRATION RATE: 17 BRPM | BODY MASS INDEX: 31.76 KG/M2 | HEIGHT: 64 IN | HEART RATE: 92 BPM

## 2023-01-09 DIAGNOSIS — R53.83 FATIGUE, UNSPECIFIED TYPE: ICD-10-CM

## 2023-01-09 DIAGNOSIS — N23 RENAL COLIC: Primary | ICD-10-CM

## 2023-01-09 DIAGNOSIS — F41.9 ANXIETY: ICD-10-CM

## 2023-01-09 DIAGNOSIS — K58.2 IRRITABLE BOWEL SYNDROME WITH BOTH CONSTIPATION AND DIARRHEA: ICD-10-CM

## 2023-01-09 DIAGNOSIS — N20.0 KIDNEY STONE: Primary | ICD-10-CM

## 2023-01-09 LAB
ALBUMIN SERPL-MCNC: 4.4 G/DL (ref 3.5–5)
ALBUMIN/GLOB SERPL: 1 (ref 1.1–2.2)
ALP SERPL-CCNC: 82 U/L (ref 45–117)
ALT SERPL-CCNC: 16 U/L (ref 12–78)
AMORPH CRY URNS QL MICRO: ABNORMAL
ANION GAP SERPL CALC-SCNC: 11 MMOL/L (ref 5–15)
APPEARANCE UR: ABNORMAL
AST SERPL-CCNC: 16 U/L (ref 15–37)
BACTERIA URNS QL MICRO: ABNORMAL /HPF
BASOPHILS # BLD: 0 K/UL (ref 0–0.1)
BASOPHILS NFR BLD: 1 % (ref 0–1)
BILIRUB SERPL-MCNC: 0.8 MG/DL (ref 0.2–1)
BILIRUB UR QL CFM: NEGATIVE
BUN SERPL-MCNC: 12 MG/DL (ref 6–20)
BUN/CREAT SERPL: 13 (ref 12–20)
CALCIUM SERPL-MCNC: 9.5 MG/DL (ref 8.5–10.1)
CHLORIDE SERPL-SCNC: 103 MMOL/L (ref 97–108)
CO2 SERPL-SCNC: 26 MMOL/L (ref 21–32)
COLOR UR: ABNORMAL
CREAT SERPL-MCNC: 0.94 MG/DL (ref 0.55–1.02)
DIFFERENTIAL METHOD BLD: NORMAL
EOSINOPHIL # BLD: 0 K/UL (ref 0–0.4)
EOSINOPHIL NFR BLD: 1 % (ref 0–7)
EPITH CASTS URNS QL MICRO: ABNORMAL /LPF
ERYTHROCYTE [DISTWIDTH] IN BLOOD BY AUTOMATED COUNT: 12.6 % (ref 11.5–14.5)
GLOBULIN SER CALC-MCNC: 4.2 G/DL (ref 2–4)
GLUCOSE SERPL-MCNC: 121 MG/DL (ref 65–100)
GLUCOSE UR STRIP.AUTO-MCNC: NEGATIVE MG/DL
HCG UR QL: NEGATIVE
HCT VFR BLD AUTO: 40.4 % (ref 35–47)
HGB BLD-MCNC: 13.2 G/DL (ref 11.5–16)
HGB UR QL STRIP: ABNORMAL
IMM GRANULOCYTES # BLD AUTO: 0 K/UL (ref 0–0.04)
IMM GRANULOCYTES NFR BLD AUTO: 0 % (ref 0–0.5)
KETONES UR QL STRIP.AUTO: ABNORMAL MG/DL
LEUKOCYTE ESTERASE UR QL STRIP.AUTO: ABNORMAL
LIPASE SERPL-CCNC: 123 U/L (ref 73–393)
LYMPHOCYTES # BLD: 2 K/UL (ref 0.8–3.5)
LYMPHOCYTES NFR BLD: 32 % (ref 12–49)
MCH RBC QN AUTO: 28.5 PG (ref 26–34)
MCHC RBC AUTO-ENTMCNC: 32.7 G/DL (ref 30–36.5)
MCV RBC AUTO: 87.3 FL (ref 80–99)
MONOCYTES # BLD: 0.4 K/UL (ref 0–1)
MONOCYTES NFR BLD: 6 % (ref 5–13)
NEUTS SEG # BLD: 3.8 K/UL (ref 1.8–8)
NEUTS SEG NFR BLD: 60 % (ref 32–75)
NITRITE UR QL STRIP.AUTO: NEGATIVE
NRBC # BLD: 0 K/UL (ref 0–0.01)
NRBC BLD-RTO: 0 PER 100 WBC
PH UR STRIP: 6 (ref 5–8)
PLATELET # BLD AUTO: 262 K/UL (ref 150–400)
PMV BLD AUTO: 11 FL (ref 8.9–12.9)
POTASSIUM SERPL-SCNC: 3.8 MMOL/L (ref 3.5–5.1)
PROT SERPL-MCNC: 8.6 G/DL (ref 6.4–8.2)
PROT UR STRIP-MCNC: 30 MG/DL
RBC # BLD AUTO: 4.63 M/UL (ref 3.8–5.2)
RBC #/AREA URNS HPF: ABNORMAL /HPF (ref 0–5)
SODIUM SERPL-SCNC: 140 MMOL/L (ref 136–145)
SP GR UR REFRACTOMETRY: >1.03 (ref 1–1.03)
UA: UC IF INDICATED,UAUC: ABNORMAL
UR CULT HOLD, URHOLD: NORMAL
UROBILINOGEN UR QL STRIP.AUTO: 0.2 EU/DL (ref 0.2–1)
WBC # BLD AUTO: 6.3 K/UL (ref 3.6–11)
WBC URNS QL MICRO: ABNORMAL /HPF (ref 0–4)

## 2023-01-09 PROCEDURE — 99284 EMERGENCY DEPT VISIT MOD MDM: CPT

## 2023-01-09 PROCEDURE — 36415 COLL VENOUS BLD VENIPUNCTURE: CPT

## 2023-01-09 PROCEDURE — 74011250636 HC RX REV CODE- 250/636: Performed by: STUDENT IN AN ORGANIZED HEALTH CARE EDUCATION/TRAINING PROGRAM

## 2023-01-09 PROCEDURE — 80053 COMPREHEN METABOLIC PANEL: CPT

## 2023-01-09 PROCEDURE — 74176 CT ABD & PELVIS W/O CONTRAST: CPT

## 2023-01-09 PROCEDURE — 81001 URINALYSIS AUTO W/SCOPE: CPT

## 2023-01-09 PROCEDURE — 83690 ASSAY OF LIPASE: CPT

## 2023-01-09 PROCEDURE — 99214 OFFICE O/P EST MOD 30 MIN: CPT | Performed by: STUDENT IN AN ORGANIZED HEALTH CARE EDUCATION/TRAINING PROGRAM

## 2023-01-09 PROCEDURE — 96374 THER/PROPH/DIAG INJ IV PUSH: CPT

## 2023-01-09 PROCEDURE — 85025 COMPLETE CBC W/AUTO DIFF WBC: CPT

## 2023-01-09 PROCEDURE — 81025 URINE PREGNANCY TEST: CPT

## 2023-01-09 RX ORDER — KETOROLAC TROMETHAMINE 30 MG/ML
30 INJECTION, SOLUTION INTRAMUSCULAR; INTRAVENOUS
Status: COMPLETED | OUTPATIENT
Start: 2023-01-09 | End: 2023-01-09

## 2023-01-09 RX ORDER — TAMSULOSIN HYDROCHLORIDE 0.4 MG/1
0.4 CAPSULE ORAL DAILY
Qty: 5 CAPSULE | Refills: 0 | Status: SHIPPED | OUTPATIENT
Start: 2023-01-09 | End: 2023-01-14

## 2023-01-09 RX ORDER — OXYCODONE AND ACETAMINOPHEN 5; 325 MG/1; MG/1
1 TABLET ORAL
Qty: 12 TABLET | Refills: 0 | Status: SHIPPED | OUTPATIENT
Start: 2023-01-09 | End: 2023-01-12

## 2023-01-09 RX ORDER — KETOROLAC TROMETHAMINE 10 MG/1
10 TABLET, FILM COATED ORAL
Qty: 15 TABLET | Refills: 0 | Status: SHIPPED | OUTPATIENT
Start: 2023-01-09 | End: 2023-01-14

## 2023-01-09 RX ORDER — SERTRALINE HYDROCHLORIDE 25 MG/1
25 TABLET, FILM COATED ORAL DAILY
Qty: 30 TABLET | Refills: 1 | Status: SHIPPED | OUTPATIENT
Start: 2023-01-09 | End: 2023-02-08

## 2023-01-09 RX ORDER — ONDANSETRON 4 MG/1
4 TABLET, ORALLY DISINTEGRATING ORAL
Qty: 12 TABLET | Refills: 0 | Status: SHIPPED | OUTPATIENT
Start: 2023-01-09 | End: 2023-01-09 | Stop reason: SDUPTHER

## 2023-01-09 RX ORDER — MORPHINE SULFATE 4 MG/ML
4 INJECTION INTRAVENOUS
Status: DISCONTINUED | OUTPATIENT
Start: 2023-01-09 | End: 2023-01-09 | Stop reason: HOSPADM

## 2023-01-09 RX ORDER — ONDANSETRON 4 MG/1
4 TABLET, ORALLY DISINTEGRATING ORAL
Qty: 12 TABLET | Refills: 0 | Status: SHIPPED | OUTPATIENT
Start: 2023-01-09

## 2023-01-09 RX ADMIN — SODIUM CHLORIDE 1000 ML: 9 INJECTION, SOLUTION INTRAVENOUS at 09:48

## 2023-01-09 RX ADMIN — SODIUM CHLORIDE 1000 ML: 9 INJECTION, SOLUTION INTRAVENOUS at 08:55

## 2023-01-09 RX ADMIN — KETOROLAC TROMETHAMINE 30 MG: 30 INJECTION, SOLUTION INTRAMUSCULAR; INTRAVENOUS at 08:57

## 2023-01-09 NOTE — ED PROVIDER NOTES
Neha Llanes is a 43 y.o. female with past medical history notable for IBS, OCD, postpartum depression with first pregnancy, presenting with flank pain. She has history of IBS and recently was having a flare in the context of eating gluten for the first time in a while, was having migratory crampy pain. She states that she suddenly developed pain in the right flank with radiation to anterior abdomen without radiation to the scapula. She has not had nausea per se. Notes the pain is sharp and located in the right lateralmost aspect of the abdomen. Denies dysuria or hematuria. Flank Pain   Associated symptoms include abdominal pain. Pertinent negatives include no chest pain, no fever, no headaches and no dysuria.       Past Medical History:   Diagnosis Date    Asthma     with illness; last needed inhaler > 1 year ago    Generalized anxiety disorder     last took medication 3 years ago    Hepatic steatosis 10/11/2022    History of palpitations in adulthood     saw Dr. Sergey King 2017, echo normal, sinus tach on holter monitor , murmur noted on exam    IBS (irritable bowel syndrome) 2022    OCD (obsessive compulsive disorder)     Panic disorder     Postpartum depression     with 1st pregnancy       Past Surgical History:   Procedure Laterality Date    HX  SECTION  2019         Family History:   Problem Relation Age of Onset    Diabetes Mother     High Cholesterol Mother     Diabetes Father     Cancer Father         of the throat    Heart Disease Maternal Grandfather     Parkinson's Disease Paternal Grandmother     Hypertension Paternal Grandmother     Liver Disease Paternal Grandfather         Primary sclerosis cholangitis       Social History     Socioeconomic History    Marital status:      Spouse name: Not on file    Number of children: Not on file    Years of education: Not on file    Highest education level: Not on file   Occupational History    Not on file   Tobacco Use Smoking status: Former    Smokeless tobacco: Never    Tobacco comments:     Quit 19 years ago smoked for 5 years   Vaping Use    Vaping Use: Never used   Substance and Sexual Activity    Alcohol use: No    Drug use: No    Sexual activity: Yes     Partners: Male     Birth control/protection: None   Other Topics Concern     Service Not Asked    Blood Transfusions Not Asked    Caffeine Concern Not Asked    Occupational Exposure Not Asked    Hobby Hazards Not Asked    Sleep Concern Not Asked    Stress Concern Not Asked    Weight Concern Not Asked    Special Diet Not Asked    Back Care Not Asked    Exercise Not Asked    Bike Helmet Not Asked    Seat Belt Not Asked    Self-Exams Not Asked   Social History Narrative    Lives with     Has 3 kids    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     Social Determinants of Health     Financial Resource Strain: Low Risk     Difficulty of Paying Living Expenses: Not hard at all   Food Insecurity: No Food Insecurity    Worried About Running Out of Food in the Last Year: Never true    Ran Out of Food in the Last Year: Never true   Transportation Needs: Not on file   Physical Activity: Not on file   Stress: Not on file   Social Connections: Not on file   Intimate Partner Violence: Not on file   Housing Stability: Not on file         ALLERGIES: Cyanocobalamin (vitamin b12), Macrobid [nitrofurantoin monohyd/m-cryst], and Pristiq [desvenlafaxine succinate]    Review of Systems   Constitutional:  Negative for chills, fatigue and fever. Eyes:  Negative for photophobia. Respiratory:  Negative for shortness of breath. Cardiovascular:  Negative for chest pain. Gastrointestinal:  Positive for abdominal pain and diarrhea. Negative for nausea and vomiting. Genitourinary:  Positive for flank pain. Negative for dysuria. Musculoskeletal:  Negative for back pain. Neurological:  Negative for headaches.    Psychiatric/Behavioral:  Negative for confusion. All other systems reviewed and are negative. Vitals:    01/09/23 0847 01/09/23 1017 01/09/23 1044 01/09/23 1046   BP: 130/80  114/76 114/76   Pulse: 82  81    Resp: 18  18    Temp: 98 °F (36.7 °C)      SpO2: 100% 97% 97% 95%   Weight: 83.4 kg (183 lb 13.8 oz)               Physical Exam  Constitutional:       General: She is not in acute distress. Appearance: She is not toxic-appearing. HENT:      Head: Normocephalic and atraumatic. Mouth/Throat:      Mouth: Mucous membranes are moist.   Eyes:      Extraocular Movements: Extraocular movements intact. Cardiovascular:      Rate and Rhythm: Normal rate and regular rhythm. Heart sounds: Normal heart sounds. Pulmonary:      Effort: Pulmonary effort is normal. No respiratory distress. Breath sounds: Normal breath sounds. Abdominal:      Palpations: Abdomen is soft. Tenderness: There is no abdominal tenderness. Negative signs include Marie's sign. Musculoskeletal:      Cervical back: Normal range of motion. Right lower leg: No edema. Left lower leg: No edema. Skin:     Capillary Refill: Capillary refill takes less than 2 seconds. Neurological:      General: No focal deficit present. Mental Status: She is alert and oriented to person, place, and time. Psychiatric:         Mood and Affect: Mood normal.        Medical Decision Making   The patient presents with abdominal pain with a differential diagnosis of abdominal pain, biliary colic, cholecystitis, diverticulitis, gastritis, GERD, torsion, pancreatitis, PID, PUD, and renal Colic     Amount and/or Complexity of Data Reviewed  Labs: ordered. Radiology: ordered. Risk  Prescription drug management. ED Course as of 01/09/23 1143   Mon Jan 09, 2023   1046 Pain significantly improved, currently at 0. We will follow-up with urology.  [NS]      ED Course User Index  [NS] Berna Alvarado MD       Procedures

## 2023-01-09 NOTE — Clinical Note
[facility]  [address]  [address]  [phone]    Work/School Note    Date: 1/9/2023    To Whom It May concern:    Germán William was seen and treated today in the emergency room by the following provider(s):  Attending Provider: Jp Gibbs MD.      Germán William is excused from work/school on 1/9/2023 through 1/11/2023. She is medically clear to return to work/school on 1/12/2023.          Sincerely,          Felix Smith RN

## 2023-01-09 NOTE — ED TRIAGE NOTES
Triage Note: Patient arrives to ER complaining of right sided flank pain (starting 0700 this am) accompanied with belching and generalized and moving abdominal pain. Patient reports recent dx of IBS. Patient suspects gluten intolerance. She states she ate chick-svetlana-a 1 wk from yesterday and has had increase in abdominal pain. Denies urinary symptoms, N/V/D. Ambulatory to room with steady gait.

## 2023-01-09 NOTE — DISCHARGE INSTRUCTIONS
[facility] Kidney Stone Hotline    The [facility] Hotline is a resource to assist you when experiencing the symptoms of a kidney stone. Call the [facility] hotline at [phone]). When you call this number, a staff member will coordinate appropriate care by either scheduling you a timely appointment at our office or directing you to immediate care, as needed.

## 2023-01-09 NOTE — Clinical Note
[facility]  [address]  [address]  [phone]    Work/School Note    Date: 1/9/2023    To Whom It May concern:    Maame Calabrese was seen and treated today in the emergency room by the following provider(s):  Attending Provider: Venkatesh Temple MD.      Maame Calabrese is excused from work/school on 1/9/2023 through 1/11/2023. She is medically clear to return to work/school on 1/12/2023.          Sincerely,          Gabi Craft MD

## 2023-01-09 NOTE — Clinical Note
[facility]  [address]  [address]  [phone]    Work/School Note    Date: 1/9/2023    To Whom It May concern:    Corby Mcdonald was seen and treated today in the emergency room by the following provider(s):  Attending Provider: Sharon Langston MD.      Corby Mcdonald is excused from work/school on 1/9/2023 through 1/11/2023. She is medically clear to return to work/school on 1/12/2023.          Sincerely,          Michelle Fuchs MD

## 2023-01-09 NOTE — PROGRESS NOTES
[facility] PRACT[facility]      Chief Complaint:     Chief Complaint   Patient presents with    Hospital Follow Up     Patient is following up after been in [address] [place] [address] emergency department, also would like to talk about GI Issues. Irritable Bowel Syndrome     Patient have been having bad symptoms getting worse. Luiz Zazueta is a 43 y.o. female that presents for: ED follow up      Assessment/Plan:     Diagnoses and all orders for this visit:    1. Kidney stone: 3mm with hydro  -     tamsulosin (Flomax) 0.4 mg capsule; Take 1 Capsule by mouth daily for 5 days. 2. Irritable bowel syndrome with both constipation and diarrhea  -     CELIAC DISEASE PROFILE (REFLEX TTG, IGG); Future  -     REFERRAL TO DIETITIAN    3. Fatigue, unspecified type  -     HEMOGLOBIN A1C WITH EAG; Future  -     TSH 3RD GENERATION; Future    4. Anxiety  -     sertraline (ZOLOFT) 25 mg tablet; Take 1 Tablet by mouth daily for 30 days. Follow up: Follow-up and Dispositions    Return in about 4 weeks (around 2/6/2023) for follow up anxiety. Renal US- hydro  Increase Zoloft? Subjective:   HPI:  Luiz Zazueta is a 43 y.o. female that presents for:    Kidney stone:  Seen in ED today and dx with kidney stone. Given Percocet and Zofran  CT showed    1. Moderate right-sided hydronephrosis due to 3 mm stone right ureterovesical  Junction    No UTI symptoms, fevers, chills, abdominal pain       IBS:  Losing weight, backtracked over the holidays a little. Last week had   Feels \"dips\" in energy about an hour after eating where she feels like she can't do anything. Noticed its worse after COVID. Lasts 30-45 minutes. Triggers panic attacks. Anxiety:  Not well controlled. Used to be on Zoloft and Wellbutrin.     Health Maintenance:  Health Maintenance Due   Topic Date Due    COVID-19 Vaccine (3 - Booster for Moderna series) 02/16/2022    Cervical cancer screen  05/19/2022        ROS:   See HPI      Past medical history, social history, and medications personally reviewed. Past Medical History:   Diagnosis Date    Asthma     with illness; last needed inhaler > 1 year ago    Generalized anxiety disorder     last took medication 3 years ago    Hepatic steatosis 10/11/2022    History of palpitations in adulthood 2015    saw Dr. Matthew Schmitt 6/2017, echo normal, sinus tach on holter monitor , murmur noted on exam    IBS (irritable bowel syndrome) 05/31/2022    OCD (obsessive compulsive disorder)     Panic disorder     Postpartum depression     with 1st pregnancy        Allergies personally reviewed. Allergies   Allergen Reactions    Cyanocobalamin (Vitamin B12) Cough    Macrobid [Nitrofurantoin Monohyd/M-Cryst] Other (comments)     Throat closed up    Pristiq [Desvenlafaxine Succinate] Other (comments)     Made her more depressed          Objective:   Vitals reviewed. Visit Vitals  /79 (BP 1 Location: Left upper arm, BP Patient Position: Sitting, BP Cuff Size: Adult)   Pulse 92   Temp 98.4 °F (36.9 °C) (Temporal)   Resp 17   Ht 5' 4\" (1.626 m)   Wt 186 lb (84.4 kg)   SpO2 97%   BMI 31.93 kg/m²        Wt Readings from Last 3 Encounters:   01/09/23 186 lb (84.4 kg)   01/09/23 183 lb 13.8 oz (83.4 kg)   11/14/22 191 lb (86.6 kg)        Physical Exam  Physical Exam     Vitals: Reviewed. General: AO x 3. No distress. Not diaphoretic. No jaundice. No cyanosis. No pallor. HEENT: No thyromegaly or JVD  Cardio: Normal rate, regular rhythm. No murmur, rubs, or gallop. Pulmonary: Effort normal. No accessory muscle use. No wheezes, rales, or rhonchi. Abdominal: Soft. No rebound or guarding. No tenderness. No distension. Extremities: No edema of lower extremities. Pulses 2+. Neurological: CN II-XII grossly intact. No focal deficits. Skin: No rash. I have reviewed pertinent labs results and other data. I have discussed the diagnosis with the patient and the intended plan as seen in the above orders.  The patient has received an after-visit summary and questions were answered concerning future plans. I have discussed medication side effects and warnings with the patient as well.     Judith Navarro, DO  01/09/23

## 2023-01-09 NOTE — PROGRESS NOTES
Chief Complaint   Patient presents with    Hospital Follow Up     Patient is following up after been in [facility] emergency department, also would like to talk about GI Issues. 1. \"Have you been to the ER, urgent care clinic since your last visit? Hospitalized since your last visit? \" Yes When: 01/09/2023 Where: [facility] Reason for visit: right side pain and kidney stone    2. \"Have you seen or consulted any other health care providers outside of the [address] [place] [address] since your last visit? \" No     3. For patients aged 39-70: Has the patient had a colonoscopy / FIT/ Cologuard? No      If the patient is female:    4. For patients aged 41-77: Has the patient had a mammogram within the past 2 years? No      5. For patients aged 21-65: Has the patient had a pap smear?  Yes - no Care Gap present         3 most recent PHQ Screens 11/14/2022   Little interest or pleasure in doing things Not at all   Feeling down, depressed, irritable, or hopeless Not at all   Total Score PHQ 2 0   Trouble falling or staying asleep, or sleeping too much -   Feeling tired or having little energy -   Poor appetite, weight loss, or overeating -   Feeling bad about yourself - or that you are a failure or have let yourself or your family down -   Trouble concentrating on things such as school, work, reading, or watching TV -   Moving or speaking so slowly that other people could have noticed; or the opposite being so fidgety that others notice -   Thoughts of being better off dead, or hurting yourself in some way -   PHQ 9 Score -   How difficult have these problems made it for you to do your work, take care of your home and get along with others -       Health Maintenance Due   Topic Date Due    COVID-19 Vaccine (3 - Booster for [employer] series) 02/16/2022    Cervical cancer screen  05/19/2022

## 2023-01-10 LAB
EST. AVERAGE GLUCOSE BLD GHB EST-MCNC: 103 MG/DL
HBA1C MFR BLD: 5.2 % (ref 4–5.6)
TSH SERPL DL<=0.05 MIU/L-ACNC: 0.64 UIU/ML (ref 0.36–3.74)

## 2023-01-12 ENCOUNTER — PATIENT MESSAGE (OUTPATIENT)
Dept: FAMILY MEDICINE CLINIC | Age: 43
End: 2023-01-12

## 2023-01-12 LAB
ENDOMYSIUM IGA SER QL: NEGATIVE
IGA SERPL-MCNC: 122 MG/DL (ref 87–352)
TTG IGA SER-ACNC: <2 U/ML (ref 0–3)

## 2023-01-18 DIAGNOSIS — K30 POSTPRANDIAL DISTRESS SYNDROME: ICD-10-CM

## 2023-01-18 DIAGNOSIS — R42 EPISODIC LIGHTHEADEDNESS: ICD-10-CM

## 2023-01-18 DIAGNOSIS — R42 LIGHTHEADEDNESS: ICD-10-CM

## 2023-01-18 DIAGNOSIS — R53.82 CHRONIC FATIGUE: Primary | ICD-10-CM

## 2023-01-20 ENCOUNTER — NURSE TRIAGE (OUTPATIENT)
Dept: OTHER | Facility: CLINIC | Age: 43
End: 2023-01-20

## 2023-01-20 ENCOUNTER — OFFICE VISIT (OUTPATIENT)
Dept: FAMILY MEDICINE CLINIC | Age: 43
End: 2023-01-20
Payer: COMMERCIAL

## 2023-01-20 VITALS
DIASTOLIC BLOOD PRESSURE: 83 MMHG | HEIGHT: 64 IN | WEIGHT: 186 LBS | HEART RATE: 85 BPM | SYSTOLIC BLOOD PRESSURE: 126 MMHG | BODY MASS INDEX: 31.76 KG/M2 | RESPIRATION RATE: 16 BRPM | OXYGEN SATURATION: 99 % | TEMPERATURE: 98.7 F

## 2023-01-20 DIAGNOSIS — R53.82 CHRONIC FATIGUE: ICD-10-CM

## 2023-01-20 DIAGNOSIS — J02.9 ACUTE PHARYNGITIS, UNSPECIFIED ETIOLOGY: Primary | ICD-10-CM

## 2023-01-20 PROCEDURE — 99213 OFFICE O/P EST LOW 20 MIN: CPT | Performed by: NURSE PRACTITIONER

## 2023-01-20 NOTE — TELEPHONE ENCOUNTER
Location of patient:[place]    Received call from Neelam at [facility] with Red Flag Complaint. Subjective: Caller states \"sore throat with red patched on back on throat\"     Current Symptoms: sore throat    Onset: 2 days ago; worsening    Associated Symptoms: NA    Pain Severity: 2/10; achingconstant    Temperature: denies     What has been tried: denies    LMP: NA Pregnant: No    Recommended disposition: See in Office Today    Care advice provided, patient verbalizes understanding; denies any other questions or concerns; instructed to call back for any new or worsening symptoms. Patient/Caller agrees with recommended disposition; writer provided warm transfer to [facility] at [facility] for appointment scheduling    Attention Provider: Thank you for allowing me to participate in the care of your patient. The patient was connected to triage in response to information provided to the [facility]. Please do not respond through this encounter as the response is not directed to a shared pool.       Reason for Disposition   Patient wants to be seen    Protocols used: Sore Throat-ADULT-[place]

## 2023-01-20 NOTE — PROGRESS NOTES
[facility] Note     Jase Dior (: 1980) is a 43 y.o. female, established patient, here for evaluation of the following chief complaint(s):  Sore Throat (Started about 1 week ago, has continually worsened)       ASSESSMENT/PLAN:  1. Acute pharyngitis, unspecified etiology  -     CULTURE, THROAT; Future  -     AMB POC RAPID STREP A  2. Chronic fatigue  -     SMITH ANTIBODIES  -     SJOGREN'S ABS, SSA AND SSB  -     RHEUMASSURE  -     VITAMIN D, 25 HYDROXY  -     VITAMIN B12  -     PHOSPHORUS  -     C REACTIVE PROTEIN, QT  -     MESFIN, DIRECT, W/REFLEX  -     URINALYSIS W/ RFLX MICROSCOPIC  -     METABOLIC PANEL, COMPREHENSIVE  -     CBC WITH AUTOMATED DIFF      Return if symptoms worsen or fail to improve. {Time Documentation Optional:80264}    SUBJECTIVE/OBJECTIVE:    Jase Dior is a 43 y.o. female seen today for ***      Began as a tickle about a week ago  Began more sore and irritated today  Speaking makes it more irritate  Voice change  Looked at back of throat nd saw red spots    Son in  has been sick   sinus infection        REVIEW OF SYSTEMS:    Review of Systems      VITAL SIGNS:    Wt Readings from Last 3 Encounters:   23 186 lb (84.4 kg)   23 186 lb (84.4 kg)   23 183 lb 13.8 oz (83.4 kg)     Temp Readings from Last 3 Encounters:   23 98.7 °F (37.1 °C) (Oral)   23 98.4 °F (36.9 °C) (Temporal)   23 98 °F (36.7 °C)     BP Readings from Last 3 Encounters:   23 126/83   23 118/79   23 114/76     Pulse Readings from Last 3 Encounters:   23 85   23 92   23 81           PHYSICAL EXAMINATION:       General: Alert, cooperative, no distress  Respiratory: Breathing comfortably, in no acute respiratory distress. Clear to auscultation bilaterally. Cardiovascular: Regular rate and rhythm, S1, S2 normal, no murmur, click, rub or gallop. Extremities: no edema.    Abdomen: Soft, non-tender, not distended. Bowel sounds normal. No masses or organomegaly. MSK: Extremities normal appearing, atraumatic, no effusion. Gait steady and unassisted. Skin: Skin color, texture, turgor normal. No rashes or lesions on exposed skin. Neurologic: A/Ox3  Psychiatric: Normal affect. Mood euthymic. Thoughts logical. Speech volume and speed normal            Treatment risks/benefits/costs/interactions/alternatives discussed with patient. Advised patient to call back or return to office if symptoms worsen/change/persist. If patient cannot reach us or should anything more severe/urgent arise he/she should proceed directly to the nearest emergency department. Discussed expected course/resolution/complications of diagnosis in detail with patient. Patient expressed understanding with the diagnosis and plan. An electronic signature was used to authenticate this note.   -- Nohemy Angel NP

## 2023-01-20 NOTE — PROGRESS NOTES
Chief Complaint   Patient presents with    Sore Throat     Worsening for a couple of days         1. \"Have you been to the ER, urgent care clinic since your last visit? Hospitalized since your last visit? \" No    2. \"Have you seen or consulted any other health care providers outside of the [address] [place] [address] since your last visit? \" No     3. For patients over 45: Has the patient had a colonoscopy? NA - based on age     If the patient is female:    4. For patients over 40: Has the patient had a mammogram? Yes - no Care Gap present    5. For patients over 21: Has the patient had a pap smear? Yes - Care Gap present.  Most recent result on file     3 most recent PHQ Screens 1/9/2023   Little interest or pleasure in doing things Not at all   Feeling down, depressed, irritable, or hopeless Not at all   Total Score PHQ 2 0   Trouble falling or staying asleep, or sleeping too much Not at all   Feeling tired or having little energy Not at all   Poor appetite, weight loss, or overeating Not at all   Feeling bad about yourself - or that you are a failure or have let yourself or your family down Not at all   Trouble concentrating on things such as school, work, reading, or watching TV Not at all   Moving or speaking so slowly that other people could have noticed; or the opposite being so fidgety that others notice Not at all   Thoughts of being better off dead, or hurting yourself in some way Not at all   PHQ 9 Score 0   How difficult have these problems made it for you to do your work, take care of your home and get along with others Not difficult at all       Health Maintenance Due   Topic Date Due    COVID-19 Vaccine (3 - Booster for Moderna series) 02/16/2022    Cervical cancer screen  05/19/2022

## 2023-01-21 LAB
25(OH)D3 SERPL-MCNC: 24.6 NG/ML (ref 30–100)
ALBUMIN SERPL-MCNC: 4.5 G/DL (ref 3.5–5)
ALBUMIN/GLOB SERPL: 1.2 (ref 1.1–2.2)
ALP SERPL-CCNC: 74 U/L (ref 45–117)
ALT SERPL-CCNC: 21 U/L (ref 12–78)
ANION GAP SERPL CALC-SCNC: 4 MMOL/L (ref 5–15)
APPEARANCE UR: CLEAR
AST SERPL-CCNC: 11 U/L (ref 15–37)
BASOPHILS # BLD: 0 K/UL (ref 0–0.1)
BASOPHILS NFR BLD: 1 % (ref 0–1)
BILIRUB SERPL-MCNC: 0.5 MG/DL (ref 0.2–1)
BILIRUB UR QL: NEGATIVE
BUN SERPL-MCNC: 9 MG/DL (ref 6–20)
BUN/CREAT SERPL: 12 (ref 12–20)
CALCIUM SERPL-MCNC: 9.6 MG/DL (ref 8.5–10.1)
CHLORIDE SERPL-SCNC: 106 MMOL/L (ref 97–108)
CO2 SERPL-SCNC: 28 MMOL/L (ref 21–32)
COLOR UR: NORMAL
CREAT SERPL-MCNC: 0.77 MG/DL (ref 0.55–1.02)
CRP SERPL-MCNC: <0.29 MG/DL (ref 0–0.6)
DIFFERENTIAL METHOD BLD: NORMAL
EOSINOPHIL # BLD: 0.1 K/UL (ref 0–0.4)
EOSINOPHIL NFR BLD: 1 % (ref 0–7)
ERYTHROCYTE [DISTWIDTH] IN BLOOD BY AUTOMATED COUNT: 12.8 % (ref 11.5–14.5)
GLOBULIN SER CALC-MCNC: 3.8 G/DL (ref 2–4)
GLUCOSE SERPL-MCNC: 93 MG/DL (ref 65–100)
GLUCOSE UR STRIP.AUTO-MCNC: NEGATIVE MG/DL
HCT VFR BLD AUTO: 41.5 % (ref 35–47)
HGB BLD-MCNC: 12.9 G/DL (ref 11.5–16)
HGB UR QL STRIP: NEGATIVE
IMM GRANULOCYTES # BLD AUTO: 0 K/UL (ref 0–0.04)
IMM GRANULOCYTES NFR BLD AUTO: 0 % (ref 0–0.5)
KETONES UR QL STRIP.AUTO: NEGATIVE MG/DL
LEUKOCYTE ESTERASE UR QL STRIP.AUTO: NEGATIVE
LYMPHOCYTES # BLD: 2.2 K/UL (ref 0.8–3.5)
LYMPHOCYTES NFR BLD: 29 % (ref 12–49)
MCH RBC QN AUTO: 28.3 PG (ref 26–34)
MCHC RBC AUTO-ENTMCNC: 31.1 G/DL (ref 30–36.5)
MCV RBC AUTO: 91 FL (ref 80–99)
MONOCYTES # BLD: 0.5 K/UL (ref 0–1)
MONOCYTES NFR BLD: 6 % (ref 5–13)
NEUTS SEG # BLD: 4.8 K/UL (ref 1.8–8)
NEUTS SEG NFR BLD: 63 % (ref 32–75)
NITRITE UR QL STRIP.AUTO: NEGATIVE
NRBC # BLD: 0 K/UL (ref 0–0.01)
NRBC BLD-RTO: 0 PER 100 WBC
PH UR STRIP: 7 (ref 5–8)
PHOSPHATE SERPL-MCNC: 2.9 MG/DL (ref 2.6–4.7)
PLATELET # BLD AUTO: 253 K/UL (ref 150–400)
PMV BLD AUTO: 12 FL (ref 8.9–12.9)
POTASSIUM SERPL-SCNC: 3.8 MMOL/L (ref 3.5–5.1)
PROT SERPL-MCNC: 8.3 G/DL (ref 6.4–8.2)
PROT UR STRIP-MCNC: NEGATIVE MG/DL
RBC # BLD AUTO: 4.56 M/UL (ref 3.8–5.2)
SODIUM SERPL-SCNC: 138 MMOL/L (ref 136–145)
SP GR UR REFRACTOMETRY: <1.005 (ref 1–1.03)
UROBILINOGEN UR QL STRIP.AUTO: 0.2 EU/DL (ref 0.2–1)
VIT B12 SERPL-MCNC: 480 PG/ML (ref 193–986)
WBC # BLD AUTO: 7.6 K/UL (ref 3.6–11)

## 2023-01-22 LAB
BACTERIA SPEC CULT: NORMAL
SERVICE CMNT-IMP: NORMAL

## 2023-01-23 LAB
ANA SER QL: NEGATIVE
ENA SM AB SER-ACNC: <0.2 AI (ref 0–0.9)
ENA SS-A AB SER-ACNC: <0.2 AI (ref 0–0.9)
ENA SS-B AB SER-ACNC: <0.2 AI (ref 0–0.9)

## 2023-01-29 LAB
14.3.3 ETA, RHEUM. ARTHRITIS: <0.2 NG/ML
RHEUMATOID FACT SERPL-ACNC: <14 UNITS/ML

## 2023-02-06 ENCOUNTER — OFFICE VISIT (OUTPATIENT)
Dept: FAMILY MEDICINE CLINIC | Age: 43
End: 2023-02-06
Payer: COMMERCIAL

## 2023-02-06 VITALS
TEMPERATURE: 98.2 F | HEART RATE: 82 BPM | BODY MASS INDEX: 31.24 KG/M2 | OXYGEN SATURATION: 98 % | RESPIRATION RATE: 17 BRPM | SYSTOLIC BLOOD PRESSURE: 109 MMHG | DIASTOLIC BLOOD PRESSURE: 73 MMHG | HEIGHT: 64 IN | WEIGHT: 183 LBS

## 2023-02-06 DIAGNOSIS — H93.12 TINNITUS OF LEFT EAR: Primary | ICD-10-CM

## 2023-02-06 DIAGNOSIS — K58.2 IRRITABLE BOWEL SYNDROME WITH BOTH CONSTIPATION AND DIARRHEA: ICD-10-CM

## 2023-02-06 DIAGNOSIS — R53.82 CHRONIC FATIGUE: ICD-10-CM

## 2023-02-06 DIAGNOSIS — M25.512 CHRONIC PAIN OF BOTH SHOULDERS: ICD-10-CM

## 2023-02-06 DIAGNOSIS — G89.29 CHRONIC PAIN OF BOTH SHOULDERS: ICD-10-CM

## 2023-02-06 DIAGNOSIS — F41.9 ANXIETY: ICD-10-CM

## 2023-02-06 DIAGNOSIS — M25.511 CHRONIC PAIN OF BOTH SHOULDERS: ICD-10-CM

## 2023-02-06 PROCEDURE — 99214 OFFICE O/P EST MOD 30 MIN: CPT | Performed by: STUDENT IN AN ORGANIZED HEALTH CARE EDUCATION/TRAINING PROGRAM

## 2023-02-06 NOTE — PROGRESS NOTES
[facility] PRACT[facility]      Chief Complaint:     Chief Complaint   Patient presents with    Follow-up     After four weeks anxiety. Betty Marmolejo is a 43 y.o. female that presents for: follow up      Assessment/Plan:   Episodes of energy dips improving, but this has happened in the past as they come and go. No drop in BP or glucose during dips. Most closely related to what she eats I.e. pizza worsens. Has seen GI and is taking Protonix. Will try adding probiotic. Discussed that Zoloft may help GI issues as well if anxiety is contributing, she will consider this. AI workup negative. Diagnoses and all orders for this visit:    1. Tinnitus of left ear  -     REFERRAL TO ENT-OTOLARYNGOLOGY    2. Chronic pain of both shoulders  -     CK; Future  -     SED RATE (ESR); Future  -     C REACTIVE PROTEIN, QT; Future     3. Nodules on abdomen: Advised to get RUQ U that is in system and can have the U tech scan these as well    Follow up: Follow-up and Dispositions    Return in about 3 months (around 5/6/2023) for follow up. Subjective:   HPI:  Betty Marmolejo is a 43 y.o. female that presents for:    Chronic fatigue:  Feels \"dips\" in energy about an hour after eating where she feels like she can't do anything. Noticed its worse after COVID. Lasts 30-45 minutes. Triggers panic attacks. Taking Protonix. Is having period right now where she is feeling better (This has happened in the past). Very dependent on how she eats (pizza makes it worse). Notices when she's about to have a BM - works 3 days a week and doesn't have BM on those days. Seen by GI. Labs only remarkable for low Vit D. Was check glucose during dips- in 90s  Checked BP     Anxiety:  RX Zoloft last visit. Has not started it.      Left ear thumping x a few months     Bilateral shoulder pain, hx of hip pain and joint pains    Nonpainful \"Knots\" on abdomen that come and go    Health Maintenance:  Health Maintenance Due Topic Date Due    COVID-19 Vaccine (3 - Booster for Moderna series) 02/16/2022    Cervical cancer screen  05/19/2022        ROS:   See HPI      Past medical history, social history, and medications personally reviewed. Past Medical History:   Diagnosis Date    Asthma     with illness; last needed inhaler > 1 year ago    Generalized anxiety disorder     last took medication 3 years ago    Hepatic steatosis 10/11/2022    History of palpitations in adulthood 2015    saw Dr. Mendez Graver 6/2017, echo normal, sinus tach on holter monitor , murmur noted on exam    IBS (irritable bowel syndrome) 05/31/2022    OCD (obsessive compulsive disorder)     Panic disorder     Postpartum depression     with 1st pregnancy        Allergies personally reviewed. Allergies   Allergen Reactions    Cyanocobalamin (Vitamin B12) Cough    Macrobid [Nitrofurantoin Monohyd/M-Cryst] Other (comments)     Throat closed up    Pristiq [Desvenlafaxine Succinate] Other (comments)     Made her more depressed          Objective:   Vitals reviewed. Visit Vitals  /73 (BP 1 Location: Left upper arm, BP Patient Position: Sitting, BP Cuff Size: Adult)   Pulse 82   Temp 98.2 °F (36.8 °C) (Temporal)   Resp 17   Ht 5' 4\" (1.626 m)   Wt 183 lb (83 kg)   LMP 02/01/2023   SpO2 98%   BMI 31.41 kg/m²        Wt Readings from Last 3 Encounters:   02/06/23 183 lb (83 kg)   01/20/23 186 lb (84.4 kg)   01/09/23 186 lb (84.4 kg)        Physical Exam  Physical Exam     Vitals: Reviewed. General: AO x 3. No distress. Not diaphoretic. No jaundice. No cyanosis. No pallor. HEENT: No thyromegaly or JVD. Bilateral cerumen impaction  Cardio: Normal rate, regular rhythm. No murmur, rubs, or gallop. Pulmonary: Effort normal. No accessory muscle use. No wheezes, rales, or rhonchi. Abdominal: Soft. No rebound or guarding. No tenderness. No distension. Right side has 2x2cm mobile nodule and 1x1cm mobile tender nodule   Skin: No rash.       I have reviewed pertinent labs results and other data. I have discussed the diagnosis with the patient and the intended plan as seen in the above orders. The patient has received an after-visit summary and questions were answered concerning future plans. I have discussed medication side effects and warnings with the patient as well.     Lucy Dobbs DO  02/06/23

## 2023-02-06 NOTE — PATIENT INSTRUCTIONS
[facility]  Dr. Leelee Feng  [phone]  [address] [place] [address] [address], [place] [id number]    Schedule ultrasound       Debrox drops and flush

## 2023-02-06 NOTE — PROGRESS NOTES
Chief Complaint   Patient presents with    Follow-up     After four weeks anxiety. 1. \"Have you been to the ER, urgent care clinic since your last visit? Hospitalized since your last visit? \" No    2. \"Have you seen or consulted any other health care providers outside of the [address] [place] [address] since your last visit? \" No     3. For patients aged 39-70: Has the patient had a colonoscopy / FIT/ Cologuard? NA - based on age      If the patient is female:    4. For patients aged 41-77: Has the patient had a mammogram within the past 2 years? Yes - no Care Gap present      5. For patients aged 21-65: Has the patient had a pap smear?  Yes - no Care Gap present         3 most recent PHQ Screens 1/9/2023   Little interest or pleasure in doing things Not at all   Feeling down, depressed, irritable, or hopeless Not at all   Total Score PHQ 2 0   Trouble falling or staying asleep, or sleeping too much Not at all   Feeling tired or having little energy Not at all   Poor appetite, weight loss, or overeating Not at all   Feeling bad about yourself - or that you are a failure or have let yourself or your family down Not at all   Trouble concentrating on things such as school, work, reading, or watching TV Not at all   Moving or speaking so slowly that other people could have noticed; or the opposite being so fidgety that others notice Not at all   Thoughts of being better off dead, or hurting yourself in some way Not at all   PHQ 9 Score 0   How difficult have these problems made it for you to do your work, take care of your home and get along with others Not difficult at all       Health Maintenance Due   Topic Date Due    COVID-19 Vaccine (3 - Booster for Moderna series) 02/16/2022    Cervical cancer screen  05/19/2022

## 2023-02-09 ENCOUNTER — LAB ONLY (OUTPATIENT)
Dept: FAMILY MEDICINE CLINIC | Age: 43
End: 2023-02-09

## 2023-02-09 DIAGNOSIS — G89.29 CHRONIC PAIN OF BOTH SHOULDERS: ICD-10-CM

## 2023-02-09 DIAGNOSIS — M25.511 CHRONIC PAIN OF BOTH SHOULDERS: ICD-10-CM

## 2023-02-09 DIAGNOSIS — M25.512 CHRONIC PAIN OF BOTH SHOULDERS: ICD-10-CM

## 2023-02-10 LAB
CK SERPL-CCNC: 58 U/L (ref 26–192)
CRP SERPL-MCNC: 0.41 MG/DL (ref 0–0.6)
ERYTHROCYTE [SEDIMENTATION RATE] IN BLOOD: 12 MM/HR (ref 0–20)

## 2023-04-27 ENCOUNTER — TRANSCRIBE ORDER (OUTPATIENT)
Dept: SCHEDULING | Age: 43
End: 2023-04-27

## 2023-04-27 DIAGNOSIS — G50.0 TRIGEMINAL NEURALGIA: Primary | ICD-10-CM

## 2023-05-12 ENCOUNTER — TRANSCRIBE ORDERS (OUTPATIENT)
Facility: HOSPITAL | Age: 43
End: 2023-05-12

## 2023-05-12 DIAGNOSIS — G50.0 TRIGEMINAL NEURALGIA: Primary | ICD-10-CM

## 2023-05-26 ENCOUNTER — HOSPITAL ENCOUNTER (OUTPATIENT)
Facility: HOSPITAL | Age: 43
Discharge: HOME | End: 2023-05-26
Payer: COMMERCIAL

## 2023-05-26 DIAGNOSIS — G50.0 TRIGEMINAL NEURALGIA: ICD-10-CM

## 2023-05-26 PROCEDURE — 70551 MRI BRAIN STEM W/O DYE: CPT

## 2023-10-25 ENCOUNTER — OFFICE VISIT (OUTPATIENT)
Age: 43
End: 2023-10-25
Payer: COMMERCIAL

## 2023-10-25 VITALS
RESPIRATION RATE: 16 BRPM | TEMPERATURE: 98.3 F | BODY MASS INDEX: 32.78 KG/M2 | OXYGEN SATURATION: 98 % | SYSTOLIC BLOOD PRESSURE: 118 MMHG | WEIGHT: 192 LBS | HEIGHT: 64 IN | HEART RATE: 102 BPM | DIASTOLIC BLOOD PRESSURE: 78 MMHG

## 2023-10-25 DIAGNOSIS — J01.40 ACUTE NON-RECURRENT PANSINUSITIS: Primary | ICD-10-CM

## 2023-10-25 PROBLEM — J30.89 ENVIRONMENTAL AND SEASONAL ALLERGIES: Status: ACTIVE | Noted: 2023-10-25

## 2023-10-25 PROCEDURE — 99213 OFFICE O/P EST LOW 20 MIN: CPT | Performed by: FAMILY MEDICINE

## 2023-10-25 RX ORDER — AMOXICILLIN AND CLAVULANATE POTASSIUM 875; 125 MG/1; MG/1
1 TABLET, FILM COATED ORAL 2 TIMES DAILY
Qty: 20 TABLET | Refills: 0 | Status: SHIPPED | OUTPATIENT
Start: 2023-10-25 | End: 2023-11-04

## 2023-10-25 ASSESSMENT — PATIENT HEALTH QUESTIONNAIRE - PHQ9
1. LITTLE INTEREST OR PLEASURE IN DOING THINGS: 0
2. FEELING DOWN, DEPRESSED OR HOPELESS: 0
SUM OF ALL RESPONSES TO PHQ QUESTIONS 1-9: 0
SUM OF ALL RESPONSES TO PHQ9 QUESTIONS 1 & 2: 0

## 2023-10-25 ASSESSMENT — ENCOUNTER SYMPTOMS
COUGH: 1
SORE THROAT: 1
WHEEZING: 0
RHINORRHEA: 0
SHORTNESS OF BREATH: 0
SINUS PAIN: 1
GASTROINTESTINAL NEGATIVE: 1

## 2023-10-25 NOTE — PROGRESS NOTES
Chief Complaint   Patient presents with    Sinusitis     HISTORY OF PRESENT ILLNESS   HPI  8 day history nasal sinus congestion, pressure in ethmoid and maxillary sinus region, fatigue, body aches, thick post nasal drainage, sore throat pain, thick yellow nasal discharge, PND causing cough productive of thick green sputum, global sinus headache. Low grade fever of 99.1 4-5 days ago. History of JUSTINE and usually just takes Claritin in the spring and summer but started taking it again about 7 days when sinus symptoms started up. Has been also taking Tylenol prn and started doing saline nasal rinses the past 2 days. Denies chest congestion, SOB, wheezing, ear pain. Eating and drinking ok. Saw some white spots back of throat last week. Went to [facility] earlier this week on 10-23-23. Did rapid strep test which was negative. Diagnosed w/ sinusitis and was prescribed Doxycycline and Prednisone but pt didn't fill either of these bc she didn't feel comfortable w/ that treatment plan. Augmentin typically works. She gets sinus infections about once a year. No prior sinus surgeries. Eating and drinking ok. REVIEW OF SYMPTOMS   Review of Systems   Constitutional:  Positive for fatigue. HENT:  Positive for congestion, sinus pain and sore throat. Negative for ear pain, rhinorrhea and sneezing. Respiratory:  Positive for cough. Negative for shortness of breath and wheezing. Cardiovascular: Negative. Gastrointestinal: Negative.               PROBLEM LIST/MEDICAL HISTORY     Patient Active Problem List    Diagnosis Date Noted    Environmental and seasonal allergies 10/25/2023     Overview Note:     Spring, summer      Hepatic steatosis 10/11/2022    Premature rupture of membranes 12/19/2019    Advanced maternal age in multigravida, third trimester 12/17/2019    38 weeks gestation of pregnancy 12/16/2019    Vaginal discharge during pregnancy in third trimester 12/16/2019    Unstable lie of fetus 12/16/2019

## 2023-10-25 NOTE — PROGRESS NOTES
Chief Complaint   Patient presents with    Sinusitis     1. \"Have you been to the ER, urgent care clinic since your last visit? Hospitalized since your last visit? \" [facility] on Monday was offered dx with acute sinusitis    2. \"Have you seen or consulted any other health care providers outside of the [address] [place] [address] since your last visit? \" No    3. For patients aged 43-73: Has the patient had a colonoscopy / FIT/ Cologuard? NA - based on age      If the patient is female:    4. For patients aged 43-66: Has the patient had a mammogram within the past 2 years? Yes - no Care Gap present done @  [facility]- in Feb      5. For patients aged 21-65: Has the patient had a pap smear?  Gyn Dr Gonzalo Bonilla @ [facility]- in Feb

## 2024-02-15 ENCOUNTER — TELEMEDICINE (OUTPATIENT)
Age: 44
End: 2024-02-15
Payer: COMMERCIAL

## 2024-02-15 DIAGNOSIS — R10.30 LOWER ABDOMINAL PAIN: Primary | ICD-10-CM

## 2024-02-15 DIAGNOSIS — R11.0 NAUSEA: ICD-10-CM

## 2024-02-15 DIAGNOSIS — K58.1 IRRITABLE BOWEL SYNDROME WITH CONSTIPATION: ICD-10-CM

## 2024-02-15 DIAGNOSIS — K90.41 GLUTEN INTOLERANCE: ICD-10-CM

## 2024-02-15 PROCEDURE — 99213 OFFICE O/P EST LOW 20 MIN: CPT | Performed by: INTERNAL MEDICINE

## 2024-02-15 NOTE — PROGRESS NOTES
[] Abnormal -          Skin:        [x] No significant exanthematous lesions or discoloration noted on facial skin         [] Abnormal -            Psychiatric:       [x] Normal Affect [] Abnormal -        [] No Hallucinations    CT Result (most recent):  CT ABDOMEN PELVIS WO IV CONTRAST 01/09/2023    Narrative  EXAM: CT ABD PELV WO CONT    INDICATION: Right flank pain, sudden onset earlier this morning    COMPARISON: None    IV CONTRAST: None.    ORAL CONTRAST: None    TECHNIQUE:  Thin axial images were obtained through the abdomen and pelvis. Coronal and  sagittal reformats were generated. CT dose reduction was achieved through use of  a standardized protocol tailored for this examination and automatic exposure  control for dose modulation.    The absence of intravenous contrast material reduces the sensitivity for  evaluation of the vasculature and solid organs.    FINDINGS:  LOWER THORAX: No significant abnormality in the incidentally imaged lower chest.  LIVER: No mass.  BILIARY TREE: Gallbladder is within normal limits. CBD is not dilated.  SPLEEN: within normal limits.  PANCREAS: No focal abnormality.  ADRENALS: Unremarkable.  KIDNEYS/URETERS: Moderate left-sided hydronephrosis due to 3 mm stone at the  left ureterovesical junction. No left-sided hydronephrosis or stone  STOMACH: Unremarkable.  SMALL BOWEL: No dilatation or wall thickening.  COLON: No dilatation or wall thickening.  APPENDIX: Not enlarged or inflamed  PERITONEUM: No ascites or pneumoperitoneum.  RETROPERITONEUM: No lymphadenopathy or aortic aneurysm.  REPRODUCTIVE ORGANS: Not enlarged  URINARY BLADDER: No mass or calculus.  BONES: No destructive bone lesion.  ABDOMINAL WALL: Small umbilical hernia containing fat.  ADDITIONAL COMMENTS: N/A    Impression  1. Moderate right-sided hydronephrosis due to 3 mm stone right ureterovesical  junction       Assessment & Plan:   Kayley was seen today for abdominal pain.    Diagnoses and all orders for

## 2024-07-09 ENCOUNTER — OFFICE VISIT (OUTPATIENT)
Age: 44
End: 2024-07-09
Payer: COMMERCIAL

## 2024-07-09 VITALS
TEMPERATURE: 98 F | OXYGEN SATURATION: 99 % | DIASTOLIC BLOOD PRESSURE: 68 MMHG | HEART RATE: 86 BPM | BODY MASS INDEX: 32.17 KG/M2 | WEIGHT: 188.4 LBS | SYSTOLIC BLOOD PRESSURE: 102 MMHG | RESPIRATION RATE: 14 BRPM | HEIGHT: 64 IN

## 2024-07-09 DIAGNOSIS — Z00.01 ENCOUNTER FOR WELL ADULT EXAM WITH ABNORMAL FINDINGS: Primary | ICD-10-CM

## 2024-07-09 DIAGNOSIS — Z13.220 LIPID SCREENING: ICD-10-CM

## 2024-07-09 DIAGNOSIS — R53.82 CHRONIC FATIGUE: ICD-10-CM

## 2024-07-09 DIAGNOSIS — R68.89 COLD INTOLERANCE: ICD-10-CM

## 2024-07-09 DIAGNOSIS — K76.0 HEPATIC STEATOSIS: ICD-10-CM

## 2024-07-09 LAB
ALBUMIN SERPL-MCNC: 4.3 G/DL (ref 3.5–5)
ALBUMIN/GLOB SERPL: 1.2 (ref 1.1–2.2)
ALP SERPL-CCNC: 86 U/L (ref 45–117)
ALT SERPL-CCNC: 22 U/L (ref 12–78)
ANION GAP SERPL CALC-SCNC: 5 MMOL/L (ref 5–15)
AST SERPL-CCNC: 17 U/L (ref 15–37)
BILIRUB SERPL-MCNC: 0.6 MG/DL (ref 0.2–1)
BUN SERPL-MCNC: 10 MG/DL (ref 6–20)
BUN/CREAT SERPL: 13 (ref 12–20)
CALCIUM SERPL-MCNC: 9.4 MG/DL (ref 8.5–10.1)
CHLORIDE SERPL-SCNC: 111 MMOL/L (ref 97–108)
CHOLEST SERPL-MCNC: 172 MG/DL
CO2 SERPL-SCNC: 25 MMOL/L (ref 21–32)
CREAT SERPL-MCNC: 0.79 MG/DL (ref 0.55–1.02)
GLOBULIN SER CALC-MCNC: 3.5 G/DL (ref 2–4)
GLUCOSE SERPL-MCNC: 84 MG/DL (ref 65–100)
HDLC SERPL-MCNC: 48 MG/DL
HDLC SERPL: 3.6 (ref 0–5)
LDLC SERPL CALC-MCNC: 100.8 MG/DL (ref 0–100)
POTASSIUM SERPL-SCNC: 3.8 MMOL/L (ref 3.5–5.1)
PROT SERPL-MCNC: 7.8 G/DL (ref 6.4–8.2)
SODIUM SERPL-SCNC: 141 MMOL/L (ref 136–145)
T4 FREE SERPL-MCNC: 1.2 NG/DL (ref 0.8–1.5)
TRIGL SERPL-MCNC: 116 MG/DL
TSH SERPL DL<=0.05 MIU/L-ACNC: 0.61 UIU/ML (ref 0.36–3.74)
VLDLC SERPL CALC-MCNC: 23.2 MG/DL

## 2024-07-09 PROCEDURE — 99214 OFFICE O/P EST MOD 30 MIN: CPT | Performed by: STUDENT IN AN ORGANIZED HEALTH CARE EDUCATION/TRAINING PROGRAM

## 2024-07-09 ASSESSMENT — PATIENT HEALTH QUESTIONNAIRE - PHQ9
SUM OF ALL RESPONSES TO PHQ QUESTIONS 1-9: 0
1. LITTLE INTEREST OR PLEASURE IN DOING THINGS: NOT AT ALL
9. THOUGHTS THAT YOU WOULD BE BETTER OFF DEAD, OR OF HURTING YOURSELF: NOT AT ALL
2. FEELING DOWN, DEPRESSED OR HOPELESS: NOT AT ALL
5. POOR APPETITE OR OVEREATING: NOT AT ALL
SUM OF ALL RESPONSES TO PHQ QUESTIONS 1-9: 0
SUM OF ALL RESPONSES TO PHQ QUESTIONS 1-9: 0
4. FEELING TIRED OR HAVING LITTLE ENERGY: NOT AT ALL
10. IF YOU CHECKED OFF ANY PROBLEMS, HOW DIFFICULT HAVE THESE PROBLEMS MADE IT FOR YOU TO DO YOUR WORK, TAKE CARE OF THINGS AT HOME, OR GET ALONG WITH OTHER PEOPLE: NOT DIFFICULT AT ALL
SUM OF ALL RESPONSES TO PHQ9 QUESTIONS 1 & 2: 0
3. TROUBLE FALLING OR STAYING ASLEEP: NOT AT ALL
6. FEELING BAD ABOUT YOURSELF - OR THAT YOU ARE A FAILURE OR HAVE LET YOURSELF OR YOUR FAMILY DOWN: NOT AT ALL
7. TROUBLE CONCENTRATING ON THINGS, SUCH AS READING THE NEWSPAPER OR WATCHING TELEVISION: NOT AT ALL
SUM OF ALL RESPONSES TO PHQ QUESTIONS 1-9: 0
8. MOVING OR SPEAKING SO SLOWLY THAT OTHER PEOPLE COULD HAVE NOTICED. OR THE OPPOSITE, BEING SO FIGETY OR RESTLESS THAT YOU HAVE BEEN MOVING AROUND A LOT MORE THAN USUAL: NOT AT ALL

## 2024-07-09 NOTE — PROGRESS NOTES
[facility]      Chief Complaint:     Chief Complaint   Patient presents with    Annual Exam       Kayley Mike is a 43 y.o. female that presents for: annual      Assessment/Plan:     Chronic episodes of brain fog/fatigue and episodes of joint pain with symptoms concerning for erythema nodosum advised patient to make appointment with rheumatology.  AI labs done last year were normal.  Rechecking thyroid function.  Discussed with patient in depth that this could all be post-COVID syndrome as well.  Consider Wellbutrin for chronic fatigue and brain fog in the future.    Kayley was seen today for annual exam.    Diagnoses and all orders for this visit:    Encounter for well adult exam with abnormal findings    Cold intolerance  -     Thyroid Antibodies; Future  -     T4, Free; Future  -     TSH; Future    Chronic fatigue    Lipid screening  -     Lipid Panel; Future    Hepatic steatosis  -     Comprehensive Metabolic Panel; Future           Follow up:     Return in about 6 months (around 1/9/2025) for chronic condition follow up.     Subjective:   HPI:  Kayley Mike is a 43 y.o. female that presents for: establish care    Annual Exam    CC:  Had episode of feeling \"knots\" on back of arm, joint pain, feeling badly, then goes away in a week or two   Cold intolerance, Raynaud's  Period is not regular    MedHx  IBS, GERD: most triggered by gluten, previously on Protonix  Hepatic steatosis  Anxiety: controlled  Chronic fatigue, brain fog: \"energy drops\" associated with certain foods (gluten)- improving, chronic fatigue/AI work up negative. Gets episodes of brain fog/fatigue--post COVID?.   Tinnitus  Kidney stone    HM:  Mammogram UTD  Pap in April 2024- requesting records Dr. Niecy Clancy Hx:  Diet: gluten restriction, does it sugar and junk food   Exercise: no, has kids   Smoking, drugs, alcohol: none    Pertinent FamHx:  No family members with colorectal, breast, ovarian, uterine, or

## 2024-07-09 NOTE — PROGRESS NOTES
Chief Complaint   Patient presents with    Annual Exam     \"Have you been to the ER, urgent care clinic since your last visit?  Hospitalized since your last visit?\"    NO    “Have you seen or consulted any other health care providers outside of [facility] since your last visit?”    GI: DONTE Rodríguez - [facility]  OB/GYN : Dr. Pemberton - [facility]        “Have you had a pap smear?”    YES - Where:  April 2024 Nurse/CMA to request most recent records if not in the chart    Date of last Cervical Cancer screen (HPV or PAP): 5/19/2019 12/18/2023     2:23 PM   PHQ-9    Little interest or pleasure in doing things 0   Feeling down, depressed, or hopeless 0   PHQ-2 Score 0   PHQ-9 Total Score 0           Financial Resource Strain: Low Risk  (12/18/2023)    Overall Financial Resource Strain (CARDIA)     Difficulty of Paying Living Expenses: Not hard at all      Food Insecurity: No Food Insecurity (12/18/2023)    Hunger Vital Sign     Worried About Running Out of Food in the Last Year: Never true     Ran Out of Food in the Last Year: Never true          Health Maintenance Due   Topic Date Due    COVID-19 Vaccine (1) Never done    Varicella vaccine (1 of 2 - 2-dose childhood series) Never done    Hepatitis B vaccine (2 of 3 - 19+ 3-dose series) 03/27/2017    Lipids  Never done    Cervical cancer screen  05/19/2022

## 2024-07-11 LAB
THYROGLOB AB SERPL-ACNC: 2 IU/ML (ref 0–0.9)
THYROPEROXIDASE AB SERPL-ACNC: <9 IU/ML (ref 0–34)

## 2025-05-23 ENCOUNTER — TELEPHONE (OUTPATIENT)
Age: 45
End: 2025-05-23

## 2025-05-23 ENCOUNTER — OFFICE VISIT (OUTPATIENT)
Age: 45
End: 2025-05-23
Payer: COMMERCIAL

## 2025-05-23 VITALS
HEIGHT: 64 IN | OXYGEN SATURATION: 98 % | DIASTOLIC BLOOD PRESSURE: 74 MMHG | HEART RATE: 88 BPM | WEIGHT: 192.8 LBS | RESPIRATION RATE: 18 BRPM | SYSTOLIC BLOOD PRESSURE: 109 MMHG | BODY MASS INDEX: 32.91 KG/M2 | TEMPERATURE: 98.5 F

## 2025-05-23 DIAGNOSIS — R76.8 THYROID ANTIBODY POSITIVE: ICD-10-CM

## 2025-05-23 DIAGNOSIS — Z00.00 ROUTINE MEDICAL EXAM: Primary | ICD-10-CM

## 2025-05-23 DIAGNOSIS — E55.9 VITAMIN D DEFICIENCY: ICD-10-CM

## 2025-05-23 DIAGNOSIS — N95.1 PERIMENOPAUSE: Primary | ICD-10-CM

## 2025-05-23 PROBLEM — N20.0 KIDNEY STONE: Status: ACTIVE | Noted: 2025-05-23

## 2025-05-23 PROCEDURE — 99204 OFFICE O/P NEW MOD 45 MIN: CPT | Performed by: FAMILY MEDICINE

## 2025-05-23 RX ORDER — ASCORBIC ACID 500 MG
500 TABLET ORAL DAILY
COMMUNITY

## 2025-05-23 SDOH — ECONOMIC STABILITY: FOOD INSECURITY: WITHIN THE PAST 12 MONTHS, THE FOOD YOU BOUGHT JUST DIDN'T LAST AND YOU DIDN'T HAVE MONEY TO GET MORE.: NEVER TRUE

## 2025-05-23 SDOH — ECONOMIC STABILITY: FOOD INSECURITY: WITHIN THE PAST 12 MONTHS, YOU WORRIED THAT YOUR FOOD WOULD RUN OUT BEFORE YOU GOT MONEY TO BUY MORE.: NEVER TRUE

## 2025-05-23 ASSESSMENT — PATIENT HEALTH QUESTIONNAIRE - PHQ9
SUM OF ALL RESPONSES TO PHQ QUESTIONS 1-9: 0
2. FEELING DOWN, DEPRESSED OR HOPELESS: NOT AT ALL
1. LITTLE INTEREST OR PLEASURE IN DOING THINGS: NOT AT ALL
SUM OF ALL RESPONSES TO PHQ QUESTIONS 1-9: 0
SUM OF ALL RESPONSES TO PHQ QUESTIONS 1-9: 0
9. THOUGHTS THAT YOU WOULD BE BETTER OFF DEAD, OR OF HURTING YOURSELF: NOT AT ALL
4. FEELING TIRED OR HAVING LITTLE ENERGY: NOT AT ALL
7. TROUBLE CONCENTRATING ON THINGS, SUCH AS READING THE NEWSPAPER OR WATCHING TELEVISION: NOT AT ALL
10. IF YOU CHECKED OFF ANY PROBLEMS, HOW DIFFICULT HAVE THESE PROBLEMS MADE IT FOR YOU TO DO YOUR WORK, TAKE CARE OF THINGS AT HOME, OR GET ALONG WITH OTHER PEOPLE: NOT DIFFICULT AT ALL
SUM OF ALL RESPONSES TO PHQ QUESTIONS 1-9: 0
8. MOVING OR SPEAKING SO SLOWLY THAT OTHER PEOPLE COULD HAVE NOTICED. OR THE OPPOSITE, BEING SO FIGETY OR RESTLESS THAT YOU HAVE BEEN MOVING AROUND A LOT MORE THAN USUAL: NOT AT ALL
5. POOR APPETITE OR OVEREATING: NOT AT ALL
3. TROUBLE FALLING OR STAYING ASLEEP: NOT AT ALL
6. FEELING BAD ABOUT YOURSELF - OR THAT YOU ARE A FAILURE OR HAVE LET YOURSELF OR YOUR FAMILY DOWN: NOT AT ALL

## 2025-05-23 NOTE — PROGRESS NOTES
Kayley Mike is a 44 y.o. female who presents to [facility].    Patient was seen by Dr. Ramon, family medicine July 2024.      Patient reports irregular heavy menses, mood swings.  Was given Slind (cycled)  not started yet, patient was concerned about fatty liver.      Has lost weight 2 years ago, went gluten free.  packing lunch at work, working part time.  Walking regularly.  has 3 kids, 5,7, 22.      Is able to sleep 8 hours. Sometimes hard to fall asleep.      Prior kidney stones, right-sided. Drinking water regularly.      Followed by GYN, [facility], Pap April 2024.   Normal urination.    Mammogram through GYN, April 2024.    Normal bowel movements. Gluten free, history of IBS.   No prior colonoscopy.  No family history of colon cancer.    Up to date on eye exam and dental.        Past Medical History:   Diagnosis Date    Asthma     with illness; last needed inhaler > 1 year ago    Environmental and seasonal allergies 10/25/2023    Spring, summer    Generalized anxiety disorder     last took medication 3 years ago    Hepatic steatosis 10/11/2022    History of palpitations in adulthood 2015    saw Dr. Spivey 6/2017, echo normal, sinus tach on holter monitor , murmur noted on exam    IBS (irritable bowel syndrome) 05/31/2022    OCD (obsessive compulsive disorder)     Panic disorder     Postpartum depression     with 1st pregnancy       Family History   Problem Relation Age of Onset    Heart Disease Maternal Grandfather     Parkinson's Disease Paternal Grandmother     Hypertension Paternal Grandmother     Liver Disease Paternal Grandfather         Primary sclerosis cholangitis    Diabetes Mother     High Cholesterol Mother     Diabetes Father     Cancer Father         of the throat        Social History     Socioeconomic History    Marital status:      Spouse name: Not on file    Number of children: 3    Years of education: Not on file    Highest education level: Not on file   Occupational

## 2025-05-23 NOTE — PROGRESS NOTES
Have you been to the ER, urgent care clinic since your last visit?  Hospitalized since your last visit?   NO    Have you seen or consulted any other health care providers outside our system since your last visit?   [facility]     “Have you had a pap smear?”    2024    Date of last Cervical Cancer screen (HPV or PAP): 5/19/2019

## 2025-07-01 DIAGNOSIS — Z00.00 ROUTINE MEDICAL EXAM: ICD-10-CM

## 2025-07-01 DIAGNOSIS — E55.9 VITAMIN D DEFICIENCY: ICD-10-CM

## 2025-08-11 LAB
BASOPHILS # BLD AUTO: 0 X10E3/UL (ref 0–0.2)
BASOPHILS NFR BLD AUTO: 0 %
EOSINOPHIL # BLD AUTO: 0.1 X10E3/UL (ref 0–0.4)
EOSINOPHIL NFR BLD AUTO: 1 %
ERYTHROCYTE [DISTWIDTH] IN BLOOD BY AUTOMATED COUNT: 12.6 % (ref 11.7–15.4)
HCT VFR BLD AUTO: 44.7 % (ref 34–46.6)
HGB BLD-MCNC: 13.9 G/DL (ref 11.1–15.9)
IMM GRANULOCYTES # BLD AUTO: 0 X10E3/UL (ref 0–0.1)
IMM GRANULOCYTES NFR BLD AUTO: 0 %
LYMPHOCYTES # BLD AUTO: 2.8 X10E3/UL (ref 0.7–3.1)
LYMPHOCYTES NFR BLD AUTO: 39 %
MCH RBC QN AUTO: 28.8 PG (ref 26.6–33)
MCHC RBC AUTO-ENTMCNC: 31.1 G/DL (ref 31.5–35.7)
MCV RBC AUTO: 93 FL (ref 79–97)
MONOCYTES # BLD AUTO: 0.5 X10E3/UL (ref 0.1–0.9)
MONOCYTES NFR BLD AUTO: 6 %
NEUTROPHILS # BLD AUTO: 3.8 X10E3/UL (ref 1.4–7)
NEUTROPHILS NFR BLD AUTO: 54 %
PLATELET # BLD AUTO: 237 X10E3/UL (ref 150–450)
RBC # BLD AUTO: 4.82 X10E6/UL (ref 3.77–5.28)
WBC # BLD AUTO: 7.2 X10E3/UL (ref 3.4–10.8)

## 2025-08-12 LAB
25(OH)D3+25(OH)D2 SERPL-MCNC: 26.8 NG/ML (ref 30–100)
ALBUMIN SERPL-MCNC: 4.7 G/DL (ref 3.9–4.9)
ALP SERPL-CCNC: 106 IU/L (ref 44–121)
ALT SERPL-CCNC: 18 IU/L (ref 0–32)
APPEARANCE UR: ABNORMAL
AST SERPL-CCNC: 16 IU/L (ref 0–40)
BILIRUB SERPL-MCNC: 0.5 MG/DL (ref 0–1.2)
BILIRUB UR QL STRIP: NEGATIVE
BUN SERPL-MCNC: 11 MG/DL (ref 6–24)
BUN/CREAT SERPL: 15 (ref 9–23)
CALCIUM SERPL-MCNC: 9.7 MG/DL (ref 8.7–10.2)
CHLORIDE SERPL-SCNC: 103 MMOL/L (ref 96–106)
CHOLEST SERPL-MCNC: 195 MG/DL (ref 100–199)
CO2 SERPL-SCNC: 19 MMOL/L (ref 20–29)
COLOR UR: YELLOW
CREAT SERPL-MCNC: 0.72 MG/DL (ref 0.57–1)
EGFRCR SERPLBLD CKD-EPI 2021: 106 ML/MIN/1.73
GLOBULIN SER CALC-MCNC: 3 G/DL (ref 1.5–4.5)
GLUCOSE SERPL-MCNC: 82 MG/DL (ref 70–99)
GLUCOSE UR QL STRIP: NEGATIVE
HBA1C MFR BLD: 5.2 % (ref 4.8–5.6)
HDLC SERPL-MCNC: 54 MG/DL
HGB UR QL STRIP: NEGATIVE
KETONES UR QL STRIP: NEGATIVE
LDLC SERPL CALC-MCNC: 115 MG/DL (ref 0–99)
LEUKOCYTE ESTERASE UR QL STRIP: NEGATIVE
NITRITE UR QL STRIP: NEGATIVE
PH UR STRIP: 6 [PH] (ref 5–7.5)
POTASSIUM SERPL-SCNC: 3.8 MMOL/L (ref 3.5–5.2)
PROT SERPL-MCNC: 7.7 G/DL (ref 6–8.5)
PROT UR QL STRIP: NEGATIVE
SODIUM SERPL-SCNC: 140 MMOL/L (ref 134–144)
SP GR UR STRIP: 1.01 (ref 1–1.03)
T4 FREE SERPL-MCNC: 1.16 NG/DL (ref 0.82–1.77)
TRIGL SERPL-MCNC: 145 MG/DL (ref 0–149)
TSH SERPL DL<=0.005 MIU/L-ACNC: 1.09 UIU/ML (ref 0.45–4.5)
UROBILINOGEN UR STRIP-MCNC: 0.2 MG/DL (ref 0.2–1)
VLDLC SERPL CALC-MCNC: 26 MG/DL (ref 5–40)

## (undated) DEVICE — LIGHT HANDLE: Brand: DEVON

## (undated) DEVICE — KENDALL SCD EXPRESS SLEEVES, KNEE LENGTH, MEDIUM: Brand: KENDALL SCD

## (undated) DEVICE — SOLUTION IV 1000ML 0.9% SOD CHL

## (undated) DEVICE — CATH FOLEY 16F LUBRI-SIL IC --

## (undated) DEVICE — MEDI-VAC NON-CONDUCTIVE SUCTION TUBING: Brand: CARDINAL HEALTH

## (undated) DEVICE — SUTURE VCRL SZ 0 L36IN ABSRB UD L40MM CT 1/2 CIR TAPERPOINT J958H

## (undated) DEVICE — SOLUTION IRRIG 1000ML H2O STRL BLT

## (undated) DEVICE — SOLIDIFIER MEDC 1200ML -- CONVERT TO 356117

## (undated) DEVICE — STAPLER SKIN SQ 30 ABSRB STPL DISP INSORB

## (undated) DEVICE — (D)PREP SKN CHLRAPRP APPL 26ML -- CONVERT TO ITEM 371833

## (undated) DEVICE — DRAPE FLD WRM W44XL66IN C6L FOR INTRATEMP SYS THERMABASIN

## (undated) DEVICE — 3000CC GUARDIAN II: Brand: GUARDIAN

## (undated) DEVICE — PACK PROCEDURE SURG C SECT KT SMH

## (undated) DEVICE — ROYALSILK SURGICAL GOWN, L: Brand: CONVERTORS

## (undated) DEVICE — COVERALL PREM SMS 2XL KNIT --

## (undated) DEVICE — REM POLYHESIVE ADULT PATIENT RETURN ELECTRODE: Brand: VALLEYLAB

## (undated) DEVICE — STERILE POLYISOPRENE POWDER-FREE SURGICAL GLOVES: Brand: PROTEXIS

## (undated) DEVICE — DEVON™ KNEE AND BODY STRAP 60" X 3" (1.5 M X 7.6 CM): Brand: DEVON